# Patient Record
Sex: MALE | Race: ASIAN | NOT HISPANIC OR LATINO | Employment: FULL TIME | URBAN - METROPOLITAN AREA
[De-identification: names, ages, dates, MRNs, and addresses within clinical notes are randomized per-mention and may not be internally consistent; named-entity substitution may affect disease eponyms.]

---

## 2019-07-10 PROBLEM — F22 PARANOIA: Status: ACTIVE | Noted: 2019-07-10

## 2019-07-11 PROBLEM — R53.83 FATIGUE: Status: ACTIVE | Noted: 2019-07-11

## 2022-10-12 ENCOUNTER — HOSPITAL ENCOUNTER (EMERGENCY)
Facility: HOSPITAL | Age: 27
Discharge: PSYCHIATRIC OR SUBSTANCE USE FACILITY | End: 2022-10-13
Attending: FAMILY MEDICINE
Payer: MEDICAID

## 2022-10-12 VITALS
HEART RATE: 78 BPM | HEIGHT: 65 IN | WEIGHT: 125 LBS | RESPIRATION RATE: 20 BRPM | TEMPERATURE: 97 F | OXYGEN SATURATION: 100 % | DIASTOLIC BLOOD PRESSURE: 95 MMHG | BODY MASS INDEX: 20.83 KG/M2 | SYSTOLIC BLOOD PRESSURE: 156 MMHG

## 2022-10-12 DIAGNOSIS — F23 ACUTE PSYCHOSIS: Primary | ICD-10-CM

## 2022-10-12 LAB
BASOPHILS # BLD AUTO: 0.04 X10(3)/MCL (ref 0–0.2)
BASOPHILS NFR BLD AUTO: 0.3 %
EOSINOPHIL # BLD AUTO: 0.04 X10(3)/MCL (ref 0–0.9)
EOSINOPHIL NFR BLD AUTO: 0.3 %
ERYTHROCYTE [DISTWIDTH] IN BLOOD BY AUTOMATED COUNT: 11.8 % (ref 11.5–17)
HCT VFR BLD AUTO: 46.6 % (ref 42–52)
HGB BLD-MCNC: 16 GM/DL (ref 14–18)
IMM GRANULOCYTES # BLD AUTO: 0.06 X10(3)/MCL (ref 0–0.04)
IMM GRANULOCYTES NFR BLD AUTO: 0.5 %
LYMPHOCYTES # BLD AUTO: 2.11 X10(3)/MCL (ref 0.6–4.6)
LYMPHOCYTES NFR BLD AUTO: 16.9 %
MCH RBC QN AUTO: 30.1 PG (ref 27–31)
MCHC RBC AUTO-ENTMCNC: 34.3 MG/DL (ref 33–36)
MCV RBC AUTO: 87.8 FL (ref 80–94)
MONOCYTES # BLD AUTO: 1.08 X10(3)/MCL (ref 0.1–1.3)
MONOCYTES NFR BLD AUTO: 8.6 %
NEUTROPHILS # BLD AUTO: 9.2 X10(3)/MCL (ref 2.1–9.2)
NEUTROPHILS NFR BLD AUTO: 73.4 %
NRBC BLD AUTO-RTO: 0 %
PLATELET # BLD AUTO: 262 X10(3)/MCL (ref 130–400)
PMV BLD AUTO: 9.6 FL (ref 7.4–10.4)
RBC # BLD AUTO: 5.31 X10(6)/MCL (ref 4.7–6.1)
WBC # SPEC AUTO: 12.5 X10(3)/MCL (ref 4.5–11.5)

## 2022-10-12 PROCEDURE — 87635 SARS-COV-2 COVID-19 AMP PRB: CPT | Performed by: FAMILY MEDICINE

## 2022-10-12 PROCEDURE — 36415 COLL VENOUS BLD VENIPUNCTURE: CPT | Performed by: FAMILY MEDICINE

## 2022-10-12 PROCEDURE — 82077 ASSAY SPEC XCP UR&BREATH IA: CPT | Performed by: FAMILY MEDICINE

## 2022-10-12 PROCEDURE — 99285 EMERGENCY DEPT VISIT HI MDM: CPT | Mod: 25

## 2022-10-12 PROCEDURE — 85025 COMPLETE CBC W/AUTO DIFF WBC: CPT | Performed by: FAMILY MEDICINE

## 2022-10-12 PROCEDURE — 81001 URINALYSIS AUTO W/SCOPE: CPT | Mod: 59 | Performed by: FAMILY MEDICINE

## 2022-10-12 PROCEDURE — 84443 ASSAY THYROID STIM HORMONE: CPT | Performed by: FAMILY MEDICINE

## 2022-10-12 PROCEDURE — 80307 DRUG TEST PRSMV CHEM ANLYZR: CPT | Performed by: FAMILY MEDICINE

## 2022-10-12 PROCEDURE — 80053 COMPREHEN METABOLIC PANEL: CPT | Performed by: FAMILY MEDICINE

## 2022-10-13 ENCOUNTER — HOSPITAL ENCOUNTER (INPATIENT)
Facility: HOSPITAL | Age: 27
LOS: 6 days | Discharge: HOME | DRG: 885 | End: 2022-10-19
Attending: PSYCHIATRY & NEUROLOGY | Admitting: PSYCHIATRY & NEUROLOGY
Payer: MEDICAID

## 2022-10-13 DIAGNOSIS — F29 PSYCHOSIS: ICD-10-CM

## 2022-10-13 LAB
ALBUMIN SERPL-MCNC: 4.4 GM/DL (ref 3.5–5)
ALBUMIN/GLOB SERPL: 1.4 RATIO (ref 1.1–2)
ALP SERPL-CCNC: 71 UNIT/L (ref 40–150)
ALT SERPL-CCNC: 15 UNIT/L (ref 0–55)
AMPHET UR QL SCN: NEGATIVE
APPEARANCE UR: CLEAR
AST SERPL-CCNC: 16 UNIT/L (ref 5–34)
BACTERIA #/AREA URNS AUTO: ABNORMAL /HPF
BARBITURATE SCN PRESENT UR: NEGATIVE
BENZODIAZ UR QL SCN: NEGATIVE
BILIRUB UR QL STRIP.AUTO: NEGATIVE MG/DL
BILIRUBIN DIRECT+TOT PNL SERPL-MCNC: 1 MG/DL
BUN SERPL-MCNC: 18.6 MG/DL (ref 8.9–20.6)
CALCIUM SERPL-MCNC: 9.5 MG/DL (ref 8.4–10.2)
CANNABINOIDS UR QL SCN: POSITIVE
CHLORIDE SERPL-SCNC: 99 MMOL/L (ref 98–107)
CO2 SERPL-SCNC: 28 MMOL/L (ref 22–29)
COCAINE UR QL SCN: NEGATIVE
COLOR UR AUTO: YELLOW
CREAT SERPL-MCNC: 1.08 MG/DL (ref 0.73–1.18)
ETHANOL SERPL-MCNC: <10 MG/DL
FENTANYL UR QL SCN: NEGATIVE
GFR SERPLBLD CREATININE-BSD FMLA CKD-EPI: >60 MLS/MIN/1.73/M2
GLOBULIN SER-MCNC: 3.2 GM/DL (ref 2.4–3.5)
GLUCOSE SERPL-MCNC: 93 MG/DL (ref 74–100)
GLUCOSE UR QL STRIP.AUTO: NORMAL MG/DL
HYALINE CASTS #/AREA URNS LPF: ABNORMAL /LPF
KETONES UR QL STRIP.AUTO: ABNORMAL MG/DL
LEUKOCYTE ESTERASE UR QL STRIP.AUTO: NEGATIVE UNIT/L
MDMA UR QL SCN: NEGATIVE
MUCOUS THREADS URNS QL MICRO: ABNORMAL /LPF
NITRITE UR QL STRIP.AUTO: NEGATIVE
OPIATES UR QL SCN: NEGATIVE
PCP UR QL: NEGATIVE
PH UR STRIP.AUTO: 6 [PH]
PH UR: 6 [PH] (ref 3–11)
POTASSIUM SERPL-SCNC: 3.6 MMOL/L (ref 3.5–5.1)
PROT SERPL-MCNC: 7.6 GM/DL (ref 6.4–8.3)
PROT UR QL STRIP.AUTO: ABNORMAL MG/DL
RBC #/AREA URNS AUTO: ABNORMAL /HPF
RBC UR QL AUTO: NEGATIVE UNIT/L
SARS-COV-2 RDRP RESP QL NAA+PROBE: NEGATIVE
SODIUM SERPL-SCNC: 140 MMOL/L (ref 136–145)
SP GR UR STRIP.AUTO: 1.04
SPECIFIC GRAVITY, URINE AUTO (.000) (OHS): 1.04 (ref 1–1.03)
SQUAMOUS #/AREA URNS LPF: ABNORMAL /HPF
TSH SERPL-ACNC: 2.31 UIU/ML (ref 0.35–4.94)
UROBILINOGEN UR STRIP-ACNC: NORMAL MG/DL
WBC #/AREA URNS AUTO: ABNORMAL /HPF

## 2022-10-13 PROCEDURE — 25000003 PHARM REV CODE 250: Performed by: FAMILY MEDICINE

## 2022-10-13 PROCEDURE — 11400000 HC PSYCH PRIVATE ROOM

## 2022-10-13 PROCEDURE — 25000003 PHARM REV CODE 250: Performed by: PSYCHIATRY & NEUROLOGY

## 2022-10-13 RX ORDER — LORAZEPAM 2 MG/ML
2 INJECTION INTRAMUSCULAR EVERY 6 HOURS PRN
Status: DISCONTINUED | OUTPATIENT
Start: 2022-10-13 | End: 2022-10-14

## 2022-10-13 RX ORDER — DIPHENHYDRAMINE HCL 50 MG
50 CAPSULE ORAL EVERY 6 HOURS PRN
Status: DISCONTINUED | OUTPATIENT
Start: 2022-10-13 | End: 2022-10-14

## 2022-10-13 RX ORDER — ADHESIVE BANDAGE
30 BANDAGE TOPICAL DAILY PRN
Status: DISCONTINUED | OUTPATIENT
Start: 2022-10-13 | End: 2022-10-19 | Stop reason: HOSPADM

## 2022-10-13 RX ORDER — DIPHENHYDRAMINE HYDROCHLORIDE 50 MG/ML
50 INJECTION INTRAMUSCULAR; INTRAVENOUS EVERY 6 HOURS
Status: DISCONTINUED | OUTPATIENT
Start: 2022-10-13 | End: 2022-10-14

## 2022-10-13 RX ORDER — ONDANSETRON 4 MG/1
4 TABLET, ORALLY DISINTEGRATING ORAL EVERY 8 HOURS PRN
Status: DISCONTINUED | OUTPATIENT
Start: 2022-10-13 | End: 2022-10-19 | Stop reason: HOSPADM

## 2022-10-13 RX ORDER — OLANZAPINE 20 MG/1
20 TABLET, ORALLY DISINTEGRATING ORAL
Status: COMPLETED | OUTPATIENT
Start: 2022-10-13 | End: 2022-10-13

## 2022-10-13 RX ORDER — TRAZODONE HYDROCHLORIDE 100 MG/1
100 TABLET ORAL NIGHTLY PRN
Status: DISCONTINUED | OUTPATIENT
Start: 2022-10-13 | End: 2022-10-19 | Stop reason: HOSPADM

## 2022-10-13 RX ORDER — HYDROXYZINE HYDROCHLORIDE 50 MG/1
50 TABLET, FILM COATED ORAL EVERY 6 HOURS PRN
Status: DISCONTINUED | OUTPATIENT
Start: 2022-10-13 | End: 2022-10-14

## 2022-10-13 RX ORDER — ACETAMINOPHEN 325 MG/1
650 TABLET ORAL EVERY 6 HOURS PRN
Status: DISCONTINUED | OUTPATIENT
Start: 2022-10-13 | End: 2022-10-19 | Stop reason: HOSPADM

## 2022-10-13 RX ORDER — MAG HYDROX/ALUMINUM HYD/SIMETH 200-200-20
30 SUSPENSION, ORAL (FINAL DOSE FORM) ORAL EVERY 6 HOURS PRN
Status: DISCONTINUED | OUTPATIENT
Start: 2022-10-13 | End: 2022-10-19 | Stop reason: HOSPADM

## 2022-10-13 RX ORDER — MAG HYDROX/ALUMINUM HYD/SIMETH 200-200-20
30 SUSPENSION, ORAL (FINAL DOSE FORM) ORAL
Status: DISCONTINUED | OUTPATIENT
Start: 2022-10-13 | End: 2022-10-14

## 2022-10-13 RX ORDER — ARIPIPRAZOLE 5 MG/1
10 TABLET ORAL DAILY
Status: DISCONTINUED | OUTPATIENT
Start: 2022-10-13 | End: 2022-10-19 | Stop reason: HOSPADM

## 2022-10-13 RX ORDER — HALOPERIDOL 5 MG/1
10 TABLET ORAL EVERY 6 HOURS PRN
Status: DISCONTINUED | OUTPATIENT
Start: 2022-10-13 | End: 2022-10-14

## 2022-10-13 RX ORDER — IBUPROFEN 200 MG
1 TABLET ORAL DAILY
Status: DISCONTINUED | OUTPATIENT
Start: 2022-10-13 | End: 2022-10-18

## 2022-10-13 RX ORDER — PROMETHAZINE HYDROCHLORIDE 25 MG/1
25 TABLET ORAL EVERY 6 HOURS PRN
Status: DISCONTINUED | OUTPATIENT
Start: 2022-10-13 | End: 2022-10-19 | Stop reason: HOSPADM

## 2022-10-13 RX ORDER — HALOPERIDOL 5 MG/ML
10 INJECTION INTRAMUSCULAR EVERY 6 HOURS PRN
Status: DISCONTINUED | OUTPATIENT
Start: 2022-10-13 | End: 2022-10-14

## 2022-10-13 RX ORDER — HYDROXYZINE HYDROCHLORIDE 50 MG/1
50 TABLET, FILM COATED ORAL EVERY 4 HOURS PRN
Status: DISCONTINUED | OUTPATIENT
Start: 2022-10-13 | End: 2022-10-19 | Stop reason: HOSPADM

## 2022-10-13 RX ADMIN — HYDROXYZINE HYDROCHLORIDE 50 MG: 50 TABLET, FILM COATED ORAL at 10:10

## 2022-10-13 RX ADMIN — ARIPIPRAZOLE 10 MG: 5 TABLET ORAL at 01:10

## 2022-10-13 RX ADMIN — OLANZAPINE 20 MG: 20 TABLET, ORALLY DISINTEGRATING ORAL at 02:10

## 2022-10-13 NOTE — PROGRESS NOTES
10/13/22 1400   [facility] Group Therapy   Group Name Therapeutic Recreation   Specific Interventions Skilled Activity Creative Expression   Participation Level Minimal   Participation Quality Needs Redirection;Withdrawn   Insight/Motivation None   Affect/Mood Display Bizarre;Blunted   Cognition RIS   Psychomotor WNL

## 2022-10-13 NOTE — NURSING
This 27 year old male admitted from [facility] ED per PEC with psychosis. He is alert with tangential thought content. Mood is irritable. Contraband search done. Patient oriented to room. Staff will monitor closely for safety.

## 2022-10-13 NOTE — GROUP NOTE
Group Psychotherapy       Group Focus: Psychodynamic Group Psychotherapy      Number of patients in attendance: 10    Group topic: Cognitive Behavioral Therapy- Therapist explored patients need to improve thought processes and identify negative thinking patterns. Patient was able to discuss ways to learn new methods to improve ways of thinking going forward. Patient continues to make progress towards treatment goals.       Group Start Time: 1045  Group End Time:  1115  Groups Date: 10/13/2022  Group Topic:  Behavioral Health  Group Department: [facility] Behavioral Health Unit  Group Facilitators:  Max Lund LCSW  _____________________________________________________________________    Patient Name: Jurgen Berg  MRN: [id number]  Patient Class: IP- Psych   Admission Date\Time: 10/13/2022  3:56 AM  Hospital Length of Stay: 0  Primary Care Provider: Primary Doctor No     Referred by: Acute Psychiatry Unit Treatment Team     Target symptoms: Psychosis and Confusion     Patient's response to treatment: Not Participating     Progress toward goals: Progressing slowly       Plan: Continue treatment on [facility]

## 2022-10-13 NOTE — H&P
[facility] - Behavioral Health Unit  History & Physical    Subjective:      Chief Complaint/Reason for Admission: psychosis     Jurgen Berg is a 27 y.o. male. [place] park [place] police with rambling speech     No past medical history on file.  No past surgical history on file.  No family history on file.       No medications prior to admission.     Review of patient's allergies indicates:  No Known Allergies     Review of Systems   Constitutional: Negative.    HENT: Negative.     Eyes: Negative.    Respiratory: Negative.     Cardiovascular: Negative.    Gastrointestinal: Negative.    Genitourinary: Negative.    Musculoskeletal: Negative.    Skin: Negative.    Neurological: Negative.    Endo/Heme/Allergies: Negative.    Psychiatric/Behavioral:  Positive for hallucinations. Negative for depression, substance abuse and suicidal ideas.      Objective:      Vital Signs (Most Recent)  Temp: 97.5 °F (36.4 °C) (10/13/22 1100)  Pulse: 104 (10/13/22 1100)  Resp: 18 (10/13/22 1100)  BP: (!) 160/103 (10/13/22 1100)  SpO2: 98 % (10/13/22 1100)    Vital Signs Range (Last 24H):  Temp:  [97.2 °F (36.2 °C)-98.7 °F (37.1 °C)]   Pulse:  []   Resp:  [18-20]   BP: (122-160)/()   SpO2:  [97 %-100 %]     Physical Exam  HENT:      Head: Normocephalic.      Right Ear: Tympanic membrane normal.      Left Ear: Tympanic membrane normal.      Nose: Nose normal.      Mouth/Throat:      Mouth: Mucous membranes are moist.   Eyes:      Extraocular Movements: Extraocular movements intact.      Pupils: Pupils are equal, round, and reactive to light.   Cardiovascular:      Rate and Rhythm: Normal rate and regular rhythm.   Pulmonary:      Effort: Pulmonary effort is normal.   Abdominal:      General: Abdomen is flat.   Musculoskeletal:         General: Normal range of motion.   Skin:     General: Skin is warm.   Neurological:      General: No focal deficit present.      Mental Status: He is alert and oriented to person,  place, and time.      Comments: Vision normal   Hearing normal   EOM intact   Face muscles normal  Facial sensation normal   Shrugs shoulders  Tongue midline          Data Review:    Recent Results (from the past 48 hour(s))   Comprehensive metabolic panel    Collection Time: 10/12/22 11:38 PM   Result Value Ref Range    Sodium Level 140 136 - 145 mmol/L    Potassium Level 3.6 3.5 - 5.1 mmol/L    Chloride 99 98 - 107 mmol/L    Carbon Dioxide 28 22 - 29 mmol/L    Glucose Level 93 74 - 100 mg/dL    Blood Urea Nitrogen 18.6 8.9 - 20.6 mg/dL    Creatinine 1.08 0.73 - 1.18 mg/dL    Calcium Level Total 9.5 8.4 - 10.2 mg/dL    Protein Total 7.6 6.4 - 8.3 gm/dL    Albumin Level 4.4 3.5 - 5.0 gm/dL    Globulin 3.2 2.4 - 3.5 gm/dL    Albumin/Globulin Ratio 1.4 1.1 - 2.0 ratio    Bilirubin Total 1.0 <=1.5 mg/dL    Alkaline Phosphatase 71 40 - 150 unit/L    Alanine Aminotransferase 15 0 - 55 unit/L    Aspartate Aminotransferase 16 5 - 34 unit/L    eGFR >60 mls/min/1.73/m2   TSH    Collection Time: 10/12/22 11:38 PM   Result Value Ref Range    Thyroid Stimulating Hormone 2.3051 0.3500 - 4.9400 uIU/mL   Ethanol    Collection Time: 10/12/22 11:38 PM   Result Value Ref Range    Ethanol Level <10.0 <=10.0 mg/dL   CBC with Differential    Collection Time: 10/12/22 11:38 PM   Result Value Ref Range    WBC 12.5 (H) 4.5 - 11.5 x10(3)/mcL    RBC 5.31 4.70 - 6.10 x10(6)/mcL    Hgb 16.0 14.0 - 18.0 gm/dL    Hct 46.6 42.0 - 52.0 %    MCV 87.8 80.0 - 94.0 fL    MCH 30.1 27.0 - 31.0 pg    MCHC 34.3 33.0 - 36.0 mg/dL    RDW 11.8 11.5 - 17.0 %    Platelet 262 130 - 400 x10(3)/mcL    MPV 9.6 7.4 - 10.4 fL    Neut % 73.4 %    Lymph % 16.9 %    Mono % 8.6 %    Eos % 0.3 %    Basophil % 0.3 %    Lymph # 2.11 0.6 - 4.6 x10(3)/mcL    Neut # 9.2 2.1 - 9.2 x10(3)/mcL    Mono # 1.08 0.1 - 1.3 x10(3)/mcL    Eos # 0.04 0 - 0.9 x10(3)/mcL    Baso # 0.04 0 - 0.2 x10(3)/mcL    IG# 0.06 (H) 0 - 0.04 x10(3)/mcL    IG% 0.5 %    NRBC% 0.0 %   Urinalysis, Reflex  to Urine Culture Urine, Clean Catch    Collection Time: 10/12/22 11:44 PM    Specimen: Urine   Result Value Ref Range    Color, UA Yellow Yellow, Light-Yellow, Dark Yellow, Theresa, Straw    Appearance, UA Clear Clear    Specific Gravity, UA 1.037     pH, UA 6.0 5.0, 5.5, 6.0, 6.5, 7.0, 7.5, 8.0, 8.5    Protein, UA Trace (A) Negative mg/dL    Glucose, UA Normal Negative, Normal mg/dL    Ketones, UA 1+ (A) Negative mg/dL    Blood, UA Negative Negative unit/L    Bilirubin, UA Negative Negative mg/dL    Urobilinogen, UA Normal 0.2, 1.0, Normal mg/dL    Nitrites, UA Negative Negative    Leukocyte Esterase, UA Negative Negative unit/L    WBC, UA 0-5 None Seen, 0-2, 3-5, 0-5 /HPF    Bacteria, UA Trace (A) None Seen /HPF    Squamous Epithelial Cells, UA None Seen None Seen /HPF    Mucous, UA Few (A) None Seen /LPF    Hyaline Casts, UA None Seen None Seen /lpf    RBC, UA 0-5 None Seen, 0-2, 3-5, 0-5 /HPF   Drug Screen, Urine    Collection Time: 10/12/22 11:44 PM   Result Value Ref Range    Amphetamines, Urine Negative Negative    Barbituates, Urine Negative Negative    Benzodiazepine, Urine Negative Negative    Cannabinoids, Urine Positive (A) Negative    Cocaine, Urine Negative Negative    Fentanyl, Urine Negative Negative    MDMA, Urine Negative Negative    Opiates, Urine Negative Negative    Phencyclidine, Urine Negative Negative    pH, Urine 6.0 3.0 - 11.0    Specific Gravity, Urine Auto 1.037 (H) 1.001 - 1.035   COVID-19 Rapid Screening    Collection Time: 10/12/22 11:44 PM   Result Value Ref Range    SARS COV-2 MOLECULAR Negative Negative        No results found.       Assessment and Plan       Psychosis

## 2022-10-13 NOTE — PLAN OF CARE
Problem: Adult Inpatient Plan of Care  Goal: Plan of Care Review  Outcome: Ongoing, Not Progressing  Goal: Patient-Specific Goal (Individualized)  Outcome: Ongoing, Not Progressing  Goal: Absence of Hospital-Acquired Illness or Injury  Outcome: Ongoing, Not Progressing  Goal: Optimal Comfort and Wellbeing  Outcome: Ongoing, Not Progressing  Goal: Readiness for Transition of Care  Outcome: Ongoing, Not Progressing     Problem: Behavior Regulation Impairment (Psychotic Signs/Symptoms)  Goal: Improved Behavioral Control (Psychotic Signs/Symptoms)  Outcome: Ongoing, Not Progressing     Problem: Cognitive Impairment (Psychotic Signs/Symptoms)  Goal: Optimal Cognitive Function (Psychotic Signs/Symptoms)  Outcome: Ongoing, Not Progressing     Problem: Decreased Participation and Engagement (Psychotic Signs/Symptoms)  Goal: Increased Participation and Engagement (Psychotic Signs/Symptoms)  Outcome: Ongoing, Not Progressing     Problem: Mood Impairment (Psychotic Signs/Symptoms)  Goal: Improved Mood Symptoms (Psychotic Signs/Symptoms)  Outcome: Ongoing, Not Progressing     Problem: Psychomotor Impairment (Psychotic Signs/Symptoms)  Goal: Improved Psychomotor Symptoms (Psychotic Signs/Symptoms)  Outcome: Ongoing, Not Progressing     Problem: Sensory Perception Impairment (Psychotic Signs/Symptoms)  Goal: Decreased Sensory Symptoms (Psychotic Signs/Symptoms)  Outcome: Ongoing, Not Progressing     Problem: Sleep Disturbance (Psychotic Signs/Symptoms)  Goal: Improved Sleep (Psychotic Signs/Symptoms)  Outcome: Ongoing, Not Progressing     Problem: Social, Occupational or Functional Impairment (Psychotic Signs/Symptoms)  Goal: Enhanced Social, Occupational or Functional Skills (Psychotic Signs/Symptoms)  Outcome: Ongoing, Not Progressing     Problem: Social, Occupational or Functional Impairment (Psychotic Signs/Symptoms)  Goal: Enhanced Social, Occupational or Functional Skills (Psychotic Signs/Symptoms)  Outcome: Ongoing, Not  Progressing     Problem: Sleep Disturbance (Psychotic Signs/Symptoms)  Goal: Improved Sleep (Psychotic Signs/Symptoms)  Outcome: Ongoing, Not Progressing     Problem: Sensory Perception Impairment (Psychotic Signs/Symptoms)  Goal: Decreased Sensory Symptoms (Psychotic Signs/Symptoms)  Outcome: Ongoing, Not Progressing     Problem: Psychomotor Impairment (Psychotic Signs/Symptoms)  Goal: Improved Psychomotor Symptoms (Psychotic Signs/Symptoms)  Outcome: Ongoing, Not Progressing     Problem: Mood Impairment (Psychotic Signs/Symptoms)  Goal: Improved Mood Symptoms (Psychotic Signs/Symptoms)  Outcome: Ongoing, Not Progressing     Problem: Decreased Participation and Engagement (Psychotic Signs/Symptoms)  Goal: Increased Participation and Engagement (Psychotic Signs/Symptoms)  Outcome: Ongoing, Not Progressing     Problem: Cognitive Impairment (Psychotic Signs/Symptoms)  Goal: Optimal Cognitive Function (Psychotic Signs/Symptoms)  Outcome: Ongoing, Not Progressing     Problem: Behavior Regulation Impairment (Psychotic Signs/Symptoms)  Goal: Improved Behavioral Control (Psychotic Signs/Symptoms)  Outcome: Ongoing, Not Progressing     Problem: Adult Inpatient Plan of Care  Goal: Readiness for Transition of Care  Outcome: Ongoing, Not Progressing     Problem: Adult Inpatient Plan of Care  Goal: Optimal Comfort and Wellbeing  Outcome: Ongoing, Not Progressing     Problem: Adult Inpatient Plan of Care  Goal: Absence of Hospital-Acquired Illness or Injury  Outcome: Ongoing, Not Progressing     Problem: Adult Inpatient Plan of Care  Goal: Patient-Specific Goal (Individualized)  Outcome: Ongoing, Not Progressing     Problem: Adult Inpatient Plan of Care  Goal: Plan of Care Review  Outcome: Ongoing, Not Progressing

## 2022-10-13 NOTE — H&P
"10/13/2022 12:19 PM   Jurgen Berg   1995   [id number]            Psychiatry Inpatient Admission Note    Date of Admission: 10/13/2022  3:56 AM    Current Legal Status: Physician's Emergency Certificate (PEC)    Chief Complaint: "I don't need to be here"    SUBJECTIVE:   History of Present Illness:   Jurgen Berg is a 27 y.o. male placed under a PEC at [facility] after police evicted him from [facility] by [employer] due to bizarre behavior.  He was apparently providing vague and ambiguous answers to questions in the ED.  He states that he was at [facility] because he is in love with a girl and she was supposed to be meeting her at the park.  When she didn't show up, he screamed to the top of his lungs.  He reports that he does not need to be here, but states that he has "Bipolar 1 and has been through many manias."  He states that he is "Unipolar, which is why he was able to scream at the top of his lungs and then act normal like an actor."  Appears acutely manic.  States that he will refuse treatment "because he doesn't need it."  Poor insight.  Poor judgment.  States that he takes care of his mother but also states that she was babysitting someone while he was at the park.  Will admit for medication management and behavior monitoring.    UDS: (+)cannabinoids  Blood alcohol: <10      Past Psychiatric History:   Previous Psychiatric Hospitalizations: 2 prior inpatient hospitalizations   Previous Medication Trials: "I don't remember"  Previous Suicide Attempts: Denies   Outpatient psychiatrist: Denies    Past Medical/Surgical History:   No past medical history on file.  No past surgical history on file.      Family Psychiatric History:   Denies     Allergies:   Review of patient's allergies indicates:  No Known Allergies    Substance Abuse History:   Tobacco: Denies  Alcohol: Denies  Illicit Substances: Cannabis.  States "every now and then"  Treatment: Denies      Current Medications:   Home Psychiatric Meds: None current " (non-compliant)    Scheduled Meds:    aluminum-magnesium hydroxide-simethicone  30 mL Oral QID (AC & HS)    diphenhydrAMINE  50 mg Intramuscular Q6H    nicotine  1 patch Transdermal Daily      PRN Meds: acetaminophen, aluminum-magnesium hydroxide-simethicone, haloperidoL **AND** diphenhydrAMINE **AND** haloperidol lactate **AND** diphenhydrAMINE **AND** lorazepam, hydrOXYzine HCL, hydrOXYzine HCL, magnesium hydroxide 400 mg/5 ml, magnesium hydroxide 400 mg/5 ml, ondansetron, promethazine, trazodone, trazodone   Psychotherapeutics (From admission, onward)      Start     Stop Route Frequency Ordered    10/13/22 0438  traZODone tablet 100 mg  (Order Panel)         -- Oral Nightly PRN 10/13/22 0438    10/13/22 0438  haloperidoL tablet 10 mg  (Med - Acute  Behavioral Management)        Question:  Is the patient competent?  Answer:  Yes   See Hyperspace for full Linked Orders Report.    -- Oral Every 6 hours PRN 10/13/22 0438    10/13/22 0438  haloperidol lactate injection 10 mg  (Med - Acute  Behavioral Management)        See Hyperspace for full Linked Orders Report.    -- IM Every 6 hours PRN 10/13/22 0438    10/13/22 0438  LORazepam injection 2 mg  (Med - Acute  Behavioral Management)        Question:  Is the patient competent?  Answer:  Yes   See Hyperspace for full Linked Orders Report.    -- IM Every 6 hours PRN 10/13/22 0438    10/13/22 0438  traZODone tablet 100 mg  (Order Panel)        Question:  Is the patient competent?  Answer:  Yes    -- Oral Nightly PRN 10/13/22 0438              Social History:  Housing Status: Lives with his mother  Relationship Status/Sexual Orientation: Never    Children: None  Education: Associate's degree   Employment Status/Info: Works as a        Legal History:   Past Charges/Incarcerations: Denies   Pending charges: Denies      OBJECTIVE:   Medical Review Of Systems:  Constitutional: negative  Respiratory: negative  Cardiovascular: negative  Gastrointestinal:  negative  Genitourinary:negative  Musculoskeletal:negative  Neurological: negative    Vitals   Vitals:    10/13/22 1100   BP: (!) 160/103   Pulse: 104   Resp: 18   Temp: 97.5 °F (36.4 °C)        Labs/Imaging/Studies:   Recent Results (from the past 48 hour(s))   Comprehensive metabolic panel    Collection Time: 10/12/22 11:38 PM   Result Value Ref Range    Sodium Level 140 136 - 145 mmol/L    Potassium Level 3.6 3.5 - 5.1 mmol/L    Chloride 99 98 - 107 mmol/L    Carbon Dioxide 28 22 - 29 mmol/L    Glucose Level 93 74 - 100 mg/dL    Blood Urea Nitrogen 18.6 8.9 - 20.6 mg/dL    Creatinine 1.08 0.73 - 1.18 mg/dL    Calcium Level Total 9.5 8.4 - 10.2 mg/dL    Protein Total 7.6 6.4 - 8.3 gm/dL    Albumin Level 4.4 3.5 - 5.0 gm/dL    Globulin 3.2 2.4 - 3.5 gm/dL    Albumin/Globulin Ratio 1.4 1.1 - 2.0 ratio    Bilirubin Total 1.0 <=1.5 mg/dL    Alkaline Phosphatase 71 40 - 150 unit/L    Alanine Aminotransferase 15 0 - 55 unit/L    Aspartate Aminotransferase 16 5 - 34 unit/L    eGFR >60 mls/min/1.73/m2   TSH    Collection Time: 10/12/22 11:38 PM   Result Value Ref Range    Thyroid Stimulating Hormone 2.3051 0.3500 - 4.9400 uIU/mL   Ethanol    Collection Time: 10/12/22 11:38 PM   Result Value Ref Range    Ethanol Level <10.0 <=10.0 mg/dL   CBC with Differential    Collection Time: 10/12/22 11:38 PM   Result Value Ref Range    WBC 12.5 (H) 4.5 - 11.5 x10(3)/mcL    RBC 5.31 4.70 - 6.10 x10(6)/mcL    Hgb 16.0 14.0 - 18.0 gm/dL    Hct 46.6 42.0 - 52.0 %    MCV 87.8 80.0 - 94.0 fL    MCH 30.1 27.0 - 31.0 pg    MCHC 34.3 33.0 - 36.0 mg/dL    RDW 11.8 11.5 - 17.0 %    Platelet 262 130 - 400 x10(3)/mcL    MPV 9.6 7.4 - 10.4 fL    Neut % 73.4 %    Lymph % 16.9 %    Mono % 8.6 %    Eos % 0.3 %    Basophil % 0.3 %    Lymph # 2.11 0.6 - 4.6 x10(3)/mcL    Neut # 9.2 2.1 - 9.2 x10(3)/mcL    Mono # 1.08 0.1 - 1.3 x10(3)/mcL    Eos # 0.04 0 - 0.9 x10(3)/mcL    Baso # 0.04 0 - 0.2 x10(3)/mcL    IG# 0.06 (H) 0 - 0.04 x10(3)/mcL    IG% 0.5 %     NRBC% 0.0 %   Urinalysis, Reflex to Urine Culture Urine, Clean Catch    Collection Time: 10/12/22 11:44 PM    Specimen: Urine   Result Value Ref Range    Color, UA Yellow Yellow, Light-Yellow, Dark Yellow, Theresa, Straw    Appearance, UA Clear Clear    Specific Gravity, UA 1.037     pH, UA 6.0 5.0, 5.5, 6.0, 6.5, 7.0, 7.5, 8.0, 8.5    Protein, UA Trace (A) Negative mg/dL    Glucose, UA Normal Negative, Normal mg/dL    Ketones, UA 1+ (A) Negative mg/dL    Blood, UA Negative Negative unit/L    Bilirubin, UA Negative Negative mg/dL    Urobilinogen, UA Normal 0.2, 1.0, Normal mg/dL    Nitrites, UA Negative Negative    Leukocyte Esterase, UA Negative Negative unit/L    WBC, UA 0-5 None Seen, 0-2, 3-5, 0-5 /HPF    Bacteria, UA Trace (A) None Seen /HPF    Squamous Epithelial Cells, UA None Seen None Seen /HPF    Mucous, UA Few (A) None Seen /LPF    Hyaline Casts, UA None Seen None Seen /lpf    RBC, UA 0-5 None Seen, 0-2, 3-5, 0-5 /HPF   Drug Screen, Urine    Collection Time: 10/12/22 11:44 PM   Result Value Ref Range    Amphetamines, Urine Negative Negative    Barbituates, Urine Negative Negative    Benzodiazepine, Urine Negative Negative    Cannabinoids, Urine Positive (A) Negative    Cocaine, Urine Negative Negative    Fentanyl, Urine Negative Negative    MDMA, Urine Negative Negative    Opiates, Urine Negative Negative    Phencyclidine, Urine Negative Negative    pH, Urine 6.0 3.0 - 11.0    Specific Gravity, Urine Auto 1.037 (H) 1.001 - 1.035   COVID-19 Rapid Screening    Collection Time: 10/12/22 11:44 PM   Result Value Ref Range    SARS COV-2 MOLECULAR Negative Negative      No results found for: PHENYTOIN, PHENOBARB, VALPROATE, CBMZ        Psychiatric Mental Status Exam:  General Appearance: appears stated age, well-developed, well-nourished  Arousal: alert  Behavior: minimizing, argumentative  Movements and Motor Activity: no abnormal involuntary movements noted  Orientation: person, place, time, and  "situation  Speech: rambles, verbose  Mood: "I'm fine"  Affect: labile, poor emotional regulation  Thought Process: flight of ideas  Associations: intact  Thought Content and Perceptions: grandiose ideations, no suicidal ideation, no homicidal ideation  Recent and Remote Memory: recent memory intact, remote memory intact  Attention and Concentration: intact, attentive to conversation  Fund of Knowledge: intact, aware of current events, vocabulary appropriate  Insight: intact  Judgment: questionable        Patient Strengths:  Access to care, Able to verbalize needs, and Stable physical health      Patient Liabilities:  Medication non-compliance and Anny      Discharge Criteria:  Improved thought process, Medication compliance, Overall functional improvement, and Improved coping skills    ASSESSMENT/PLAN:   Diagnosis:  Bipolar Disorder, most recent episode mixed, severe (F31.63)  Cannabis use disorder (F12.10)    No past medical history on file.       Plan:  -Admit to to [facility]  -Abilify 10mg daily  -Will attempt to obtain outside psychiatric records if available  -SW to assist with aftercare planning and collateral  -Once stable discharge home with outpatient follow up care and/or rehab  -Continue inpatient treatment under a PEC and/or CEC for danger to self/ danger to others/grave disability as evidenced by danger to self and significant anny      Estimated length of stay: 5-7 days    Estimated Disposition: Home    Estimated Follow-up: Outpatient medication management      On this date, I have reviewed the medical history and Nursing Assessment, as well as records from referral source.  I have evaluated the mental status of the above named person and concur with the findings of all assessments.  I have provided medical direction for the development of the Treatment Plan.    I conclude that this patient meets admission criteria for inpatient treatment.  I certify that this patient poses a danger to self or others, or " would otherwise be considered gravely disabled based on this assessment and/or provided collateral information.     I have provided medical direction for the development of the Treatment plan.  These services will be provided while this patient is under my care and will be based on an individualized plan of care.  The patient can demonstrate a reasonable expectation of improvement in his/her disorder as a result of the active treatment being provided.      Elfego Booth M.D.

## 2022-10-13 NOTE — PLAN OF CARE
Problem: Adult Inpatient Plan of Care  Goal: Plan of Care Review  10/13/2022 0433 by Gordon Loza RN  Outcome: Ongoing, Progressing  10/13/2022 0430 by Gordon Loza RN  Outcome: Ongoing, Progressing  Goal: Patient-Specific Goal (Individualized)  10/13/2022 0433 by Gordon Loza RN  Outcome: Ongoing, Progressing  10/13/2022 0430 by Gordon Loza RN  Outcome: Ongoing, Progressing  Goal: Absence of Hospital-Acquired Illness or Injury  10/13/2022 0433 by Gordon Loza RN  Outcome: Ongoing, Progressing  10/13/2022 0430 by Gordon Loza RN  Outcome: Ongoing, Progressing  Goal: Optimal Comfort and Wellbeing  10/13/2022 0433 by Gordon Loza RN  Outcome: Ongoing, Progressing  10/13/2022 0430 by Gordon Loza RN  Outcome: Ongoing, Progressing  Goal: Readiness for Transition of Care  10/13/2022 0433 by Gordon Loza RN  Outcome: Ongoing, Progressing  10/13/2022 0430 by Gordon Loza RN  Outcome: Ongoing, Progressing     Problem: Behavior Regulation Impairment (Psychotic Signs/Symptoms)  Goal: Improved Behavioral Control (Psychotic Signs/Symptoms)  Outcome: Ongoing, Progressing     Problem: Cognitive Impairment (Psychotic Signs/Symptoms)  Goal: Optimal Cognitive Function (Psychotic Signs/Symptoms)  Outcome: Ongoing, Progressing     Problem: Decreased Participation and Engagement (Psychotic Signs/Symptoms)  Goal: Increased Participation and Engagement (Psychotic Signs/Symptoms)  Outcome: Ongoing, Progressing     Problem: Mood Impairment (Psychotic Signs/Symptoms)  Goal: Improved Mood Symptoms (Psychotic Signs/Symptoms)  Outcome: Ongoing, Progressing     Problem: Psychomotor Impairment (Psychotic Signs/Symptoms)  Goal: Improved Psychomotor Symptoms (Psychotic Signs/Symptoms)  Outcome: Ongoing, Progressing     Problem: Sensory Perception Impairment (Psychotic Signs/Symptoms)  Goal: Decreased Sensory Symptoms (Psychotic Signs/Symptoms)  Outcome: Ongoing, Progressing     Problem: Sleep Disturbance (Psychotic Signs/Symptoms)  Goal:  Improved Sleep (Psychotic Signs/Symptoms)  Outcome: Ongoing, Progressing     Problem: Social, Occupational or Functional Impairment (Psychotic Signs/Symptoms)  Goal: Enhanced Social, Occupational or Functional Skills (Psychotic Signs/Symptoms)  Outcome: Ongoing, Progressing

## 2022-10-13 NOTE — ED PROVIDER NOTES
"Encounter Date: 10/12/2022       History     Chief Complaint   Patient presents with    Psychiatric Evaluation     Evicted from [facility] by [employer] this PM. Pt bizarre but responds to questions with vague and ambiguous answers. Denies SI, HI, AV hallucinations.     27-year-old male brought in by ambulance for psychiatric evaluation.  Patient was evicted by [place] police department from [facility].  Patient reports that he was waiting for his "one true love", and rambling with significant tangential thoughts.  Patient reports that he lives in [place], born and raised here, and still lives with his mother.  Patient denies suicidal or homicidal ideations.  Currently calm and cooperative though very bizarre behavior.    The history is provided by the patient and the EMS personnel.   Review of patient's allergies indicates:  No Known Allergies  History reviewed. No pertinent past medical history.  History reviewed. No pertinent surgical history.  History reviewed. No pertinent family history.     Review of Systems   Constitutional:  Negative for chills, fatigue and fever.   HENT:  Negative for ear pain, rhinorrhea and sore throat.    Eyes:  Negative for photophobia and pain.   Respiratory:  Negative for cough, shortness of breath and wheezing.    Cardiovascular:  Negative for chest pain.   Gastrointestinal:  Negative for abdominal pain, diarrhea, nausea and vomiting.   Genitourinary:  Negative for dysuria.   Neurological:  Negative for dizziness, weakness and headaches.   All other systems reviewed and are negative.    Physical Exam     Initial Vitals [10/12/22 2324]   BP Pulse Resp Temp SpO2   (!) 156/95 78 20 97.2 °F (36.2 °C) 100 %      MAP       --         Physical Exam    Nursing note and vitals reviewed.  Constitutional: He appears well-developed and well-nourished.   HENT:   Head: Normocephalic and atraumatic.   Eyes: EOM are normal. Pupils are equal, round, and reactive to light.   Neck: Neck supple. "   Normal range of motion.  Cardiovascular:  Normal rate, regular rhythm and normal heart sounds.     Exam reveals no gallop and no friction rub.       No murmur heard.  Pulmonary/Chest: Breath sounds normal. No respiratory distress.   Abdominal: Abdomen is soft. Bowel sounds are normal. He exhibits no distension. There is no abdominal tenderness.   Musculoskeletal:         General: Normal range of motion.      Cervical back: Normal range of motion and neck supple.     Neurological: He is alert and oriented to person, place, and time. He has normal strength.   Skin: Skin is warm and dry.   Psychiatric: He has a normal mood and affect. His behavior is normal. His speech is tangential. Thought content is delusional. He expresses impulsivity.       ED Course   Procedures  Labs Reviewed   URINALYSIS, REFLEX TO URINE CULTURE - Abnormal; Notable for the following components:       Result Value    Protein, UA Trace (*)     Ketones, UA 1+ (*)     Bacteria, UA Trace (*)     Mucous, UA Few (*)     All other components within normal limits   DRUG SCREEN, URINE (BEAKER) - Abnormal; Notable for the following components:    Cannabinoids, Urine Positive (*)     Specific Gravity, Urine Auto 1.037 (*)     All other components within normal limits    Narrative:     Cut off concentrations:    Amphetamines - 1000 ng/ml  Barbiturates - 200 ng/ml  Benzodiazepine - 200 ng/ml  Cannabinoids (THC) - 50 ng/ml  Cocaine - 300 ng/ml  Fentanyl - 1.0 ng/ml  MDMA - 500 ng/ml  Opiates - 300 ng/ml   Phencyclidine (PCP) - 25 ng/ml    Specimen submitted for drug analysis and tested for pH and specific gravity in order to evaluate sample integrity. Suspect tampering if specific gravity is <1.003 and/or pH is not within the range of 4.5 - 8.0  False negatives may result form substances such as bleach added to urine.  False positives may result for the presence of a substance with similar chemical structure to the drug or its metabolite.    This test  provides only a PRELIMINARY analytical test result. A more specific alternate chemical method must be used in order to obtain a confirmed analytical result. Gas chromatography/mass spectrometry (GC/MS) is the preferred confirmatory method. Other chemical confirmation methods are available. Clinical consideration and professional judgement should be applied to any drug of abuse test result, particularly when preliminary positive results are used.    Positive results will be confirmed only at the physicians request. Unconfirmed screening results are to be used only for medical purposes (treatment).        CBC WITH DIFFERENTIAL - Abnormal; Notable for the following components:    WBC 12.5 (*)     IG# 0.06 (*)     All other components within normal limits   TSH - Normal   ALCOHOL,MEDICAL (ETHANOL) - Normal   SARS-COV-2 RNA AMPLIFICATION, QUAL - Normal    Narrative:     The IDNOW COVID-19 assay is a rapid molecular in vitro diagnostic test utilizing an isothermal nucleic acid amplification technology intended for the qualitative detection of nucleic acid from the SARS-CoV-2 viral RNA in direct nasal, nasopharyngeal or throat swabs from individuals who are suspected of COVID-19 by their healthcare provider.   CBC W/ AUTO DIFFERENTIAL    Narrative:     The following orders were created for panel order CBC auto differential.  Procedure                               Abnormality         Status                     ---------                               -----------         ------                     CBC with Differential[[id number]]        Abnormal            Final result                 Please view results for these tests on the individual orders.   COMPREHENSIVE METABOLIC PANEL   EXTRA TUBES    Narrative:     The following orders were created for panel order EXTRA TUBES.  Procedure                               Abnormality         Status                     ---------                               -----------         ------                      Gold Top Hold[[id number]]                                                                 Please view results for these tests on the individual orders.   GOLD TOP HOLD          Imaging Results    None          Medications - No data to display  Medical Decision Making:   Initial Assessment:   Suspect drug-induced type psychosis, versus new onset schizophrenia.  No previous records in system for the patient.  Will try and get parents contact information and call them for further additional information regarding the patient.  Due to current psychosis, patient placed under physician's Emergency certificate as he is currently gravely disabled.           ED Course as of 10/13/22 0115   Thu Oct 13, 2022   0113 Cannabinoids, Urine(!): Positive [MW]   0114 Cleared for psychiatric placement.  On further review, patient does have a psychiatric history.  Patient initially had the wrong name in the chart, therefore showed up as a new record.  Patient has been calm and cooperative while in the emergency room. [MW]      ED Course User Index  [MW] Loy Barker MD       Medically cleared for psychiatry placement: 10/13/2022  1:14 AM         Clinical Impression:   Final diagnoses:  [F23] Acute psychosis (Primary)      ED Disposition Condition    Transfer to Psych Facility Stable          ED Prescriptions    None       Follow-up Information    None          Lyo Barker MD  10/13/22 0115

## 2022-10-13 NOTE — PROGRESS NOTES
10/13/22 1000   [facility] Group Therapy   Group Name Therapeutic Recreation   Specific Interventions Skilled Activity Mild Exercises   Participation Level None   Participation Quality Disruptive;Needs Redirection;Requires Prompting   Insight/Motivation None   Affect/Mood Display Bizarre;Elevated;Impulsive;Labile;Inappropriate;Irritable   Cognition RIS   Psychomotor WNL

## 2022-10-14 LAB
ALBUMIN SERPL-MCNC: 4.1 GM/DL (ref 3.5–5)
ALBUMIN/GLOB SERPL: 1.2 RATIO (ref 1.1–2)
ALP SERPL-CCNC: 72 UNIT/L (ref 40–150)
ALT SERPL-CCNC: 18 UNIT/L (ref 0–55)
AST SERPL-CCNC: 21 UNIT/L (ref 5–34)
BASOPHILS # BLD AUTO: 0.02 X10(3)/MCL (ref 0–0.2)
BASOPHILS NFR BLD AUTO: 0.3 %
BILIRUBIN DIRECT+TOT PNL SERPL-MCNC: 0.8 MG/DL
BUN SERPL-MCNC: 14.6 MG/DL (ref 8.9–20.6)
CALCIUM SERPL-MCNC: 9.7 MG/DL (ref 8.4–10.2)
CHLORIDE SERPL-SCNC: 102 MMOL/L (ref 98–107)
CHOLEST SERPL-MCNC: 127 MG/DL
CHOLEST/HDLC SERPL: 4 {RATIO} (ref 0–5)
CO2 SERPL-SCNC: 31 MMOL/L (ref 22–29)
CREAT SERPL-MCNC: 1.06 MG/DL (ref 0.73–1.18)
EOSINOPHIL # BLD AUTO: 0.1 X10(3)/MCL (ref 0–0.9)
EOSINOPHIL NFR BLD AUTO: 1.3 %
ERYTHROCYTE [DISTWIDTH] IN BLOOD BY AUTOMATED COUNT: 12 % (ref 11.5–17)
GFR SERPLBLD CREATININE-BSD FMLA CKD-EPI: >60 MLS/MIN/1.73/M2
GLOBULIN SER-MCNC: 3.4 GM/DL (ref 2.4–3.5)
GLUCOSE SERPL-MCNC: 89 MG/DL (ref 74–100)
HCT VFR BLD AUTO: 47.3 % (ref 42–52)
HDLC SERPL-MCNC: 36 MG/DL (ref 35–60)
HGB BLD-MCNC: 16.2 GM/DL (ref 14–18)
IMM GRANULOCYTES # BLD AUTO: 0.03 X10(3)/MCL (ref 0–0.04)
IMM GRANULOCYTES NFR BLD AUTO: 0.4 %
LDLC SERPL CALC-MCNC: 79 MG/DL (ref 50–140)
LYMPHOCYTES # BLD AUTO: 2.27 X10(3)/MCL (ref 0.6–4.6)
LYMPHOCYTES NFR BLD AUTO: 30.4 %
MCH RBC QN AUTO: 30.2 PG (ref 27–31)
MCHC RBC AUTO-ENTMCNC: 34.2 MG/DL (ref 33–36)
MCV RBC AUTO: 88.2 FL (ref 80–94)
MONOCYTES # BLD AUTO: 0.53 X10(3)/MCL (ref 0.1–1.3)
MONOCYTES NFR BLD AUTO: 7.1 %
NEUTROPHILS # BLD AUTO: 4.5 X10(3)/MCL (ref 2.1–9.2)
NEUTROPHILS NFR BLD AUTO: 60.5 %
NRBC BLD AUTO-RTO: 0 %
PLATELET # BLD AUTO: 252 X10(3)/MCL (ref 130–400)
PMV BLD AUTO: 10.1 FL (ref 7.4–10.4)
POTASSIUM SERPL-SCNC: 5.1 MMOL/L (ref 3.5–5.1)
PROT SERPL-MCNC: 7.5 GM/DL (ref 6.4–8.3)
RBC # BLD AUTO: 5.36 X10(6)/MCL (ref 4.7–6.1)
SODIUM SERPL-SCNC: 142 MMOL/L (ref 136–145)
T PALLIDUM AB SER QL: NONREACTIVE
TRIGL SERPL-MCNC: 62 MG/DL (ref 34–140)
TSH SERPL-ACNC: 1.35 UIU/ML (ref 0.35–4.94)
VLDLC SERPL CALC-MCNC: 12 MG/DL
WBC # SPEC AUTO: 7.5 X10(3)/MCL (ref 4.5–11.5)

## 2022-10-14 PROCEDURE — 80053 COMPREHEN METABOLIC PANEL: CPT | Performed by: PSYCHIATRY & NEUROLOGY

## 2022-10-14 PROCEDURE — 36415 COLL VENOUS BLD VENIPUNCTURE: CPT | Performed by: PSYCHIATRY & NEUROLOGY

## 2022-10-14 PROCEDURE — 86780 TREPONEMA PALLIDUM: CPT | Performed by: PSYCHIATRY & NEUROLOGY

## 2022-10-14 PROCEDURE — 85025 COMPLETE CBC W/AUTO DIFF WBC: CPT | Performed by: PSYCHIATRY & NEUROLOGY

## 2022-10-14 PROCEDURE — 84443 ASSAY THYROID STIM HORMONE: CPT | Performed by: PSYCHIATRY & NEUROLOGY

## 2022-10-14 PROCEDURE — 80061 LIPID PANEL: CPT | Performed by: PSYCHIATRY & NEUROLOGY

## 2022-10-14 PROCEDURE — 11400000 HC PSYCH PRIVATE ROOM

## 2022-10-14 PROCEDURE — 25000003 PHARM REV CODE 250: Performed by: PSYCHIATRY & NEUROLOGY

## 2022-10-14 RX ORDER — LORAZEPAM 1 MG/1
2 TABLET ORAL EVERY 6 HOURS PRN
Status: DISCONTINUED | OUTPATIENT
Start: 2022-10-14 | End: 2022-10-19 | Stop reason: HOSPADM

## 2022-10-14 RX ORDER — HALOPERIDOL 5 MG/ML
10 INJECTION INTRAMUSCULAR EVERY 6 HOURS PRN
Status: DISCONTINUED | OUTPATIENT
Start: 2022-10-14 | End: 2022-10-19 | Stop reason: HOSPADM

## 2022-10-14 RX ORDER — DIPHENHYDRAMINE HYDROCHLORIDE 50 MG/ML
50 INJECTION INTRAMUSCULAR; INTRAVENOUS EVERY 6 HOURS PRN
Status: DISCONTINUED | OUTPATIENT
Start: 2022-10-14 | End: 2022-10-19 | Stop reason: HOSPADM

## 2022-10-14 RX ORDER — LORAZEPAM 2 MG/ML
2 INJECTION INTRAMUSCULAR EVERY 6 HOURS PRN
Status: DISCONTINUED | OUTPATIENT
Start: 2022-10-14 | End: 2022-10-19 | Stop reason: HOSPADM

## 2022-10-14 RX ORDER — DIPHENHYDRAMINE HCL 50 MG
50 CAPSULE ORAL EVERY 6 HOURS PRN
Status: DISCONTINUED | OUTPATIENT
Start: 2022-10-14 | End: 2022-10-19 | Stop reason: HOSPADM

## 2022-10-14 RX ORDER — HALOPERIDOL 5 MG/1
10 TABLET ORAL EVERY 6 HOURS PRN
Status: DISCONTINUED | OUTPATIENT
Start: 2022-10-14 | End: 2022-10-19 | Stop reason: HOSPADM

## 2022-10-14 RX ADMIN — TRAZODONE HYDROCHLORIDE 100 MG: 100 TABLET ORAL at 08:10

## 2022-10-14 RX ADMIN — HYDROXYZINE HYDROCHLORIDE 50 MG: 50 TABLET, FILM COATED ORAL at 09:10

## 2022-10-14 RX ADMIN — ARIPIPRAZOLE 10 MG: 5 TABLET ORAL at 09:10

## 2022-10-14 RX ADMIN — HYDROXYZINE HYDROCHLORIDE 50 MG: 50 TABLET, FILM COATED ORAL at 12:10

## 2022-10-14 NOTE — PLAN OF CARE
"Psychosocial Assessment     Pt is a 27 y.o. YO  male admitted due to bizarre behavior. Pt UDS was Positive for THC.  Smokes THC vape pen. Pt ETOH < 10. Pt denies  SI, HI, and AVH. Pt last inpatient  was maybe a few months . Pt presents Cooperative with CM staff 1:1. [place] Pt has  0 dependents. Pt  is Single .  Pt completed Some college. Two year degree in music production. Pt denies  service.  Pt denies financial issues. Pt employed.  Pt works at  [employer]. Pt denies legal issues. Pt states they do not receive comfort from spiritual practices. Pt emergency contact is Torrie, his Sister. Their phone number is   [phone]. Pt discharge plan at this time is home. Currently lives in [place], [place]. Has been told he has bipolar one. Does report being depressed and some anxiety over the last few weeks.    "I was waiting for the love of my life, I been going through an episode. Her name is Osmin Odell, as time went on, I realized she was the one for me. I became desperate. I have never screamed so loud before in my life"  Inpatient a couple years ago in [place] for psychiatric reasons.       10/14/22 1231   Initial Information   Source of Information patient   Reason for Admission Bizarre behavior   Mutuality/Individual Preferences   Patient-Specific Goals (Include Timeframe) Jurgen will remain compliant with medications to promote a decrease in symptoms over 7 days.   Coping/Stress/Tolerance   Major Change/Loss/Stressor/Fears relationship concerns   Relationship/Environment   Primary Source of Support/Comfort sibling(s)   Name of Support/Comfort Primary Source Torrie Berg   Lives With parent(s)   Resource/Environmental Concerns   Current Living Arrangements home/apartment/condo   Substance Use/Withdrawal   Substance Use Current, used prior to admission  (UDS pos for THC)   Additional Tobacco Use   How many cigarettes do you typically have per day? 0   Abuse Screen (yes " response referral indicated)   Feels Unsafe at Home or Work/School no   Feels Threatened by Someone no   Does Anyone Try to Keep You From Having Contact with Others or Doing Things Outside Your Home? no   Physical Signs of Abuse Present no   Abuse Details   Physical Abuse No   Sexual Abuse No   Suicidal Ideation   1. Wish to be Dead (Lifetime) No   1. Wish to be Dead (Past Month) No   1. Wish to be Dead (Now) No   2. Non-Specific Active Suicidal Thoughts (Lifetime) No   2. Non-Specific Active Suicidal Thoughts (Past Month) No   2. Non-Specific Active Suicidal Thoughts (Now) No   3. Active Suicidal Ideation with any Methods (Not Plan) Without Intent to Act (Lifetime) No   3. Active Sucidal Ideation with any Methods (Not Plan) Without Intent to Act (Past Month) No   3. Active Sucidal Ideation with any Methods (Not Plan) Without Intent to Act (Now) No   4. Active Suicidal Ideation with Some Intent to Act, Without Specific Plan (Lifetime) No   4. Active Suicidal Ideation with Some Intent to Act, Without Specific Plan (Past Month) No   4. Active Suicidal Ideation with Some Intent to Act, Without Specific Plan (Now) No   5. Active Suicidal Ideation with Specific Plan and Intent (Lifetime) No   5. Active Suicidal Ideation with Specific Plan and Intent (Past Month) No   5. Active Suicidal Ideation with Specific Plan and Intent (Now) No   Suicide Risk No Risk   Violence Risk   Feels Like Hurting Others no   Previous Attempt to Harm Others no   AUDIT-C (Alcohol Use Disorders ID Test)   Alcohol Use In Past Year 0-->never   Alcohol Amount Per Day In Past Year 0-->none   More Than 6 Drinks On One Occasion 0-->never   Total AUDIT-C Score 0   Depression Screen (Over the Past Two Weeks)   Have You Felt Down, Depressed or Hopeless? yes   Have You Felt Little Interest or Pleasure in Doing Things? yes   Transition Planning   Patient/Family Anticipates Transition to home with family   Patient/Family Anticipated Services at Transition  mental health services   Transportation Anticipated family or friend will provide

## 2022-10-14 NOTE — PLAN OF CARE
Problem: Adult Inpatient Plan of Care  Goal: Plan of Care Review  Outcome: Ongoing, Progressing  Goal: Patient-Specific Goal (Individualized)  Outcome: Ongoing, Progressing  Goal: Absence of Hospital-Acquired Illness or Injury  Outcome: Ongoing, Progressing  Goal: Optimal Comfort and Wellbeing  Outcome: Ongoing, Progressing  Goal: Readiness for Transition of Care  Outcome: Ongoing, Progressing     Problem: Behavior Regulation Impairment (Psychotic Signs/Symptoms)  Goal: Improved Behavioral Control (Psychotic Signs/Symptoms)  Outcome: Ongoing, Progressing     Problem: Cognitive Impairment (Psychotic Signs/Symptoms)  Goal: Optimal Cognitive Function (Psychotic Signs/Symptoms)  Outcome: Ongoing, Progressing     Problem: Decreased Participation and Engagement (Psychotic Signs/Symptoms)  Goal: Increased Participation and Engagement (Psychotic Signs/Symptoms)  Outcome: Ongoing, Progressing     Problem: Mood Impairment (Psychotic Signs/Symptoms)  Goal: Improved Mood Symptoms (Psychotic Signs/Symptoms)  Outcome: Ongoing, Progressing     Problem: Psychomotor Impairment (Psychotic Signs/Symptoms)  Goal: Improved Psychomotor Symptoms (Psychotic Signs/Symptoms)  Outcome: Ongoing, Progressing     Problem: Sensory Perception Impairment (Psychotic Signs/Symptoms)  Goal: Decreased Sensory Symptoms (Psychotic Signs/Symptoms)  Outcome: Ongoing, Progressing     Problem: Sleep Disturbance (Psychotic Signs/Symptoms)  Goal: Improved Sleep (Psychotic Signs/Symptoms)  Outcome: Ongoing, Progressing     Problem: Social, Occupational or Functional Impairment (Psychotic Signs/Symptoms)  Goal: Enhanced Social, Occupational or Functional Skills (Psychotic Signs/Symptoms)  Outcome: Ongoing, Progressing

## 2022-10-14 NOTE — PROGRESS NOTES
10/14/22 1400   [facility] Group Therapy   Group Name Therapeutic Recreation   Specific Interventions Skilled Activity Creative Expression   Participation Level Active;Attentive   Participation Quality Cooperative   Insight/Motivation None   Affect/Mood Display Blunted;Anxious   Cognition Pre-Occupied;RIS   Psychomotor WNL

## 2022-10-14 NOTE — PROGRESS NOTES
10/14/2022 2:01 PM   Jurgen Berg   1995   [id number]        Psychiatry Progress Note     SUBJECTIVE:   Jurgen Berg is a 27 y.o. male placed under a [facility] at [facility] after police evicted him from [place] by [employer] due to bizarre behavior.  Calmer and more linear today.  Much less irritability.  Abilify started yesterday.  Appears to be tolerating well.    UDS: (+)cannabinoids  Blood alcohol: <10     Current Medications:   Scheduled Meds:    ARIPiprazole  10 mg Oral Daily    nicotine  1 patch Transdermal Daily      PRN Meds: acetaminophen, aluminum-magnesium hydroxide-simethicone, haloperidoL **AND** diphenhydrAMINE **AND** haloperidol lactate **AND** diphenhydrAMINE **AND** lorazepam, hydrOXYzine HCL, LORazepam, magnesium hydroxide 400 mg/5 ml, magnesium hydroxide 400 mg/5 ml, ondansetron, promethazine, trazodone, trazodone   Psychotherapeutics (From admission, onward)      Start     Stop Route Frequency Ordered    10/14/22 0846  LORazepam tablet 2 mg         -- Oral Every 6 hours PRN 10/14/22 0748    10/14/22 0750  haloperidoL tablet 10 mg  (Med - Acute  Behavioral Management)        Question:  Is the patient competent?  Answer:  Yes   See Hyperspace for full Linked Orders Report.    -- Oral Every 6 hours PRN 10/14/22 0755    10/14/22 0750  haloperidol lactate injection 10 mg  (Med - Acute  Behavioral Management)        See Hyperspace for full Linked Orders Report.    -- IM Every 6 hours PRN 10/14/22 0755    10/14/22 0750  LORazepam injection 2 mg  (Med - Acute  Behavioral Management)        Question:  Is the patient competent?  Answer:  Yes   See Hyperspace for full Linked Orders Report.    -- IM Every 6 hours PRN 10/14/22 0755    10/13/22 1345  ARIPiprazole tablet 10 mg         -- Oral Daily 10/13/22 1243    10/13/22 0438  traZODone tablet 100 mg  (Order Panel)         -- Oral Nightly PRN 10/13/22 0438    10/13/22 0438  traZODone tablet 100 mg  (Order Panel)        Question:  Is the patient competent?  Answer:  Yes     -- Oral Nightly PRN 10/13/22 0438            Allergies:   Review of patient's allergies indicates:  No Known Allergies     OBJECTIVE:   Vitals   Vitals:    10/14/22 1100   BP: 126/77   Pulse: 90   Resp: 20   Temp: 97.9 °F (36.6 °C)        Labs/Imaging/Studies:   Recent Results (from the past 36 hour(s))   Comprehensive metabolic panel    Collection Time: 10/14/22  6:37 AM   Result Value Ref Range    Sodium Level 142 136 - 145 mmol/L    Potassium Level 5.1 3.5 - 5.1 mmol/L    Chloride 102 98 - 107 mmol/L    Carbon Dioxide 31 (H) 22 - 29 mmol/L    Glucose Level 89 74 - 100 mg/dL    Blood Urea Nitrogen 14.6 8.9 - 20.6 mg/dL    Creatinine 1.06 0.73 - 1.18 mg/dL    Calcium Level Total 9.7 8.4 - 10.2 mg/dL    Protein Total 7.5 6.4 - 8.3 gm/dL    Albumin Level 4.1 3.5 - 5.0 gm/dL    Globulin 3.4 2.4 - 3.5 gm/dL    Albumin/Globulin Ratio 1.2 1.1 - 2.0 ratio    Bilirubin Total 0.8 <=1.5 mg/dL    Alkaline Phosphatase 72 40 - 150 unit/L    Alanine Aminotransferase 18 0 - 55 unit/L    Aspartate Aminotransferase 21 5 - 34 unit/L    eGFR >60 mls/min/1.73/m2   SYPHILIS ANTIBODY (WITH REFLEX RPR)    Collection Time: 10/14/22  6:37 AM   Result Value Ref Range    Syphilis Antibody Nonreactive Nonreactive, Equivocal   Lipid panel    Collection Time: 10/14/22  6:37 AM   Result Value Ref Range    Cholesterol Total 127 <=200 mg/dL    HDL Cholesterol 36 35 - 60 mg/dL    Triglyceride 62 34 - 140 mg/dL    Cholesterol/HDL Ratio 4 0 - 5    Very Low Density Lipoprotein 12     LDL Cholesterol 79.00 50.00 - 140.00 mg/dL   TSH    Collection Time: 10/14/22  6:37 AM   Result Value Ref Range    Thyroid Stimulating Hormone 1.3518 0.3500 - 4.9400 uIU/mL   CBC with Differential    Collection Time: 10/14/22  6:37 AM   Result Value Ref Range    WBC 7.5 4.5 - 11.5 x10(3)/mcL    RBC 5.36 4.70 - 6.10 x10(6)/mcL    Hgb 16.2 14.0 - 18.0 gm/dL    Hct 47.3 42.0 - 52.0 %    MCV 88.2 80.0 - 94.0 fL    MCH 30.2 27.0 - 31.0 pg    MCHC 34.2 33.0 - 36.0 mg/dL     "RDW 12.0 11.5 - 17.0 %    Platelet 252 130 - 400 x10(3)/mcL    MPV 10.1 7.4 - 10.4 fL    Neut % 60.5 %    Lymph % 30.4 %    Mono % 7.1 %    Eos % 1.3 %    Basophil % 0.3 %    Lymph # 2.27 0.6 - 4.6 x10(3)/mcL    Neut # 4.5 2.1 - 9.2 x10(3)/mcL    Mono # 0.53 0.1 - 1.3 x10(3)/mcL    Eos # 0.10 0 - 0.9 x10(3)/mcL    Baso # 0.02 0 - 0.2 x10(3)/mcL    IG# 0.03 0 - 0.04 x10(3)/mcL    IG% 0.4 %    NRBC% 0.0 %          Medical Review Of Systems:  Constitutional: negative  Respiratory: negative  Cardiovascular: negative  Gastrointestinal: negative  Genitourinary:negative  Musculoskeletal:negative  Neurological: negative      Psychiatric Mental Status Exam:  General Appearance: appears stated age, well-developed, well-nourished  Arousal: alert  Behavior: improving  Movements and Motor Activity: no abnormal involuntary movements noted  Orientation: person, place, time, and situation  Speech: rambles, verbose  Mood: "Better"  Affect: improving  Thought Process: flight of ideas  Associations: intact  Thought Content and Perceptions: grandiose ideations (improving), no suicidal ideation, no homicidal ideation  Recent and Remote Memory: recent memory intact, remote memory intact  Attention and Concentration: intact, attentive to conversation  Fund of Knowledge: intact, aware of current events, vocabulary appropriate  Insight: questionable  Judgment: questionable    ASSESSMENT/PLAN:   Diagnosis:  Bipolar Disorder, most recent episode mixed, severe (F31.63)  Cannabis use disorder (F12.10)    No past medical history on file.     Plan:  -Will plan to titrate Abilify during his stay.    Expected Disposition Plan: Home        Elfego Booth M.D.  "

## 2022-10-14 NOTE — CARE UPDATE
I was waiting for the love of my life, I been going through an episode.   Her name is Osmin Odell, as time went on, I realized she was the one for me. I became desperate. I have never screamed so loud before in my life. Inpatient a couple years ago in [place] was in psych hospital     Psychosocial Assessment     Pt is a 27 y.o. YO  male admitted due to ***. Pt UDS was Positive for THC.  Smokes vape pen. Pt ETOH < 10. Pt denies  SI, HI, and AVH. Pt last inpatient  was maybe a few months . Pt presents Cooperative with CM staff 1:1. Pt originally from [place]  . Pt has  0 dependents. Pt  is Single .  Pt completed Some college. Two year degree in music production. Pt denies  service.  Pt denies financial issues. Pt employed.  Pt works at  [employer]. Pt denies legal issues. Pt states they do not receive comfort from spiritual practices. Pt emergency contact is Torrie, his Sister. Their phone number is   [phone]. . Pt discharge plan at this time is home. Currently lives in [place], [place]. Torrie Correa Has been told he has bipolar one.  Depressed and anxiety over the last few weeks.

## 2022-10-14 NOTE — PROGRESS NOTES
10/13/22 1136   General   Admit Date 10/13/22   Primary Diagnosis psychosis & mood d/o   Secondary Diagnosis cannabis abuse   Number of Children 0   Children Living? 0   Occupation shipping/delivery services   Does the patient have dentures? No   If you were to take part in activities, which of the following would you prefer? Both   Do you feel like you have enough to keep you busy now? Yes   Do you believe that you have the opportunity for physical activity? Yes   Activity Capabilities Moderate   Subjective   Patient states My body wants me to be here; Learn how to love   Assessment   Mobility ambulates independently   Transfers independently   Visual Acuity normal vision   Hearing normal   Speech/Communication normal   Cognitive Concerns oriented x4;problem solving;abstract thinking;confusion   Emotional Concerns excessive emotional response;critical of self or others   Leisure Interest Survey   Leisure Interest Survey No   Plan   Planned Therapy Intervention Group Recreational Therapy   Expected Length of Stay 5-7 days   PT Frequency Minimum of 3 visits per week   Jurgen is a 27 male admitted for psychosis & mood d/o with a uds +cannabis. Pt reports ability to perform his ADL's and is interested in MH services.

## 2022-10-14 NOTE — CARE UPDATE
"CM spoke with pt's sister Torrie [phone]    "He's bad bipolar for years now. This all started when he got a new car, it was financially stressful for him and I think it triggered this episode. The last week he hasn't been sleeping. He's been going through a manic episode. He is really upset about this girl, but he never liked her before. I think it's an impact of the manic episode, because before this he never really talked to her.  He hasn't been taking his medicine at home. He just smokes weed. He says the medicine gives him headaches.  I want him to be ok because he had been at [facility] for like 5 hours. He was sitting there and tearful and crying about the girl. We will come to visitation and we can pick him up at discharge."  "
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
## 2022-10-15 PROCEDURE — 11400000 HC PSYCH PRIVATE ROOM

## 2022-10-15 PROCEDURE — 25000003 PHARM REV CODE 250: Performed by: PSYCHIATRY & NEUROLOGY

## 2022-10-15 RX ADMIN — TRAZODONE HYDROCHLORIDE 100 MG: 100 TABLET ORAL at 08:10

## 2022-10-15 RX ADMIN — ARIPIPRAZOLE 10 MG: 5 TABLET ORAL at 08:10

## 2022-10-15 NOTE — GROUP NOTE
Group Psychotherapy       Group Focus: Psychodynamic Group Psychotherapy      Number of patients in attendance: 13    Group Start Time: 1045  Group End Time:  1130  Groups Date: 10/15/2022  Group Topic:  Behavioral Health  Group Department: [facility]  Group Facilitators:  Russell Watts LMSW  _____________________________________________________________________    Patient Name: Jurgen Berg  MRN: [id number]  Patient Class: IP- Psych   Admission Date\Time: 10/13/2022  3:56 AM  Hospital Length of Stay: 2  Primary Care Provider: Primary Doctor No     Referred by: Behavioral Medicine Unit Treatment Team     Target symptoms: Depression and Psychosis     Patient's response to treatment: Active Listening and Self-disclosure     Progress toward goals: Minimal progress     Interval History:        Diagnosis: Psychosis     Plan: Continue treatment on BMU

## 2022-10-15 NOTE — PLAN OF CARE
Problem: Adult Inpatient Plan of Care  Goal: Plan of Care Review  10/15/2022 0401 by Malcom Ramírez RN  Outcome: Ongoing, Progressing  10/15/2022 0401 by Malcom Ramírez RN  Outcome: Ongoing, Progressing  Goal: Patient-Specific Goal (Individualized)  10/15/2022 0401 by Malcom Ramírez RN  Outcome: Ongoing, Progressing  10/15/2022 0401 by Malcom Ramírez RN  Outcome: Ongoing, Progressing  Goal: Absence of Hospital-Acquired Illness or Injury  10/15/2022 0401 by Malcom Ramírez RN  Outcome: Ongoing, Progressing  10/15/2022 0401 by aMlcom Ramírez RN  Outcome: Ongoing, Progressing  Goal: Optimal Comfort and Wellbeing  10/15/2022 0401 by Malcom Ramírez RN  Outcome: Ongoing, Progressing  10/15/2022 0401 by Malcom Ramírez RN  Outcome: Ongoing, Progressing  Goal: Readiness for Transition of Care  10/15/2022 0401 by Malcom Ramírez RN  Outcome: Ongoing, Progressing  10/15/2022 0401 by Malcom Ramírez RN  Outcome: Ongoing, Progressing     Problem: Behavior Regulation Impairment (Psychotic Signs/Symptoms)  Goal: Improved Behavioral Control (Psychotic Signs/Symptoms)  10/15/2022 0401 by Malcom Ramírez RN  Outcome: Ongoing, Progressing  10/15/2022 0401 by Malcom Ramírez RN  Outcome: Ongoing, Progressing     Problem: Cognitive Impairment (Psychotic Signs/Symptoms)  Goal: Optimal Cognitive Function (Psychotic Signs/Symptoms)  10/15/2022 0401 by Malcom Ramírez RN  Outcome: Ongoing, Progressing  10/15/2022 0401 by Malcom Ramírez RN  Outcome: Ongoing, Progressing     Problem: Decreased Participation and Engagement (Psychotic Signs/Symptoms)  Goal: Increased Participation and Engagement (Psychotic Signs/Symptoms)  10/15/2022 0401 by Malcom Ramírez RN  Outcome: Ongoing, Progressing  10/15/2022 0401 by Malcom Ramírez RN  Outcome: Ongoing, Progressing     Problem: Mood Impairment (Psychotic Signs/Symptoms)  Goal: Improved Mood Symptoms (Psychotic  Signs/Symptoms)  10/15/2022 0401 by Malcom Ramírez RN  Outcome: Ongoing, Progressing  10/15/2022 0401 by Malcom Ramírez RN  Outcome: Ongoing, Progressing     Problem: Psychomotor Impairment (Psychotic Signs/Symptoms)  Goal: Improved Psychomotor Symptoms (Psychotic Signs/Symptoms)  10/15/2022 0401 by Maclom Ramírez RN  Outcome: Ongoing, Progressing  10/15/2022 0401 by Malcom Ramírez RN  Outcome: Ongoing, Progressing     Problem: Sensory Perception Impairment (Psychotic Signs/Symptoms)  Goal: Decreased Sensory Symptoms (Psychotic Signs/Symptoms)  10/15/2022 0401 by Malcom Ramírez RN  Outcome: Ongoing, Progressing  10/15/2022 0401 by Malcom Ramírez RN  Outcome: Ongoing, Progressing     Problem: Sleep Disturbance (Psychotic Signs/Symptoms)  Goal: Improved Sleep (Psychotic Signs/Symptoms)  10/15/2022 0401 by Malcom aRmírez RN  Outcome: Ongoing, Progressing  10/15/2022 0401 by Malcom Ramírez RN  Outcome: Ongoing, Progressing     Problem: Social, Occupational or Functional Impairment (Psychotic Signs/Symptoms)  Goal: Enhanced Social, Occupational or Functional Skills (Psychotic Signs/Symptoms)  10/15/2022 0401 by Malcom Ramírez RN  Outcome: Ongoing, Progressing  10/15/2022 0401 by Malcom Ramírez RN  Outcome: Ongoing, Progressing

## 2022-10-16 PROCEDURE — 25000003 PHARM REV CODE 250: Performed by: PSYCHIATRY & NEUROLOGY

## 2022-10-16 PROCEDURE — 11400000 HC PSYCH PRIVATE ROOM

## 2022-10-16 RX ADMIN — HYDROXYZINE HYDROCHLORIDE 50 MG: 50 TABLET, FILM COATED ORAL at 03:10

## 2022-10-16 RX ADMIN — ARIPIPRAZOLE 10 MG: 5 TABLET ORAL at 08:10

## 2022-10-16 RX ADMIN — TRAZODONE HYDROCHLORIDE 100 MG: 100 TABLET ORAL at 08:10

## 2022-10-17 PROCEDURE — 25000003 PHARM REV CODE 250: Performed by: PSYCHIATRY & NEUROLOGY

## 2022-10-17 PROCEDURE — 11400000 HC PSYCH PRIVATE ROOM

## 2022-10-17 RX ORDER — HYDROXYZINE HYDROCHLORIDE 50 MG/1
50 TABLET, FILM COATED ORAL 2 TIMES DAILY
Status: DISCONTINUED | OUTPATIENT
Start: 2022-10-17 | End: 2022-10-19 | Stop reason: HOSPADM

## 2022-10-17 RX ADMIN — HYDROXYZINE HYDROCHLORIDE 50 MG: 50 TABLET, FILM COATED ORAL at 08:10

## 2022-10-17 RX ADMIN — HYDROXYZINE HYDROCHLORIDE 50 MG: 50 TABLET, FILM COATED ORAL at 01:10

## 2022-10-17 RX ADMIN — TRAZODONE HYDROCHLORIDE 100 MG: 100 TABLET ORAL at 08:10

## 2022-10-17 RX ADMIN — ARIPIPRAZOLE 10 MG: 5 TABLET ORAL at 08:10

## 2022-10-17 NOTE — PROGRESS NOTES
10/17/22 1000   [facility] Group Therapy   Group Name Therapeutic Recreation   Specific Interventions Skilled Activity Mild Exercises   Participation Level Active;Supportive;Attentive   Participation Quality Cooperative   Insight/Motivation Limited   Affect/Mood Display Blunted   Cognition Pre-Occupied   Psychomotor WNL

## 2022-10-17 NOTE — PROGRESS NOTES
10/17/22 [address] [place] [address] Group Therapy   Group Name Therapeutic Recreation   Specific Interventions Skilled Activity Creative Expression   Participation Level Active;Supportive;Attentive;Sharing   Participation Quality Cooperative   Insight/Motivation Limited   Affect/Mood Display Blunted   Cognition Pre-Occupied   Psychomotor WNL

## 2022-10-17 NOTE — PROGRESS NOTES
10/16/22 2000   Pain/Comfort/Sleep   Preferred Pain Scale number (Numeric Rating Pain Scale)   Comfort/Acceptable Pain Level 0   Pain Rating (0-10): Rest 0   Pain Rating (0-10): Activity 0   Coping/Psychosocial   Verbalized Emotional State acceptance   Plan of Care Reviewed With patient   Patient Agreement with Plan of Care agrees   Behavioral   General Appearance WDL WDL   General Appearance dress appropriate for weather/appropriate for setting   Behavior WDL   Behavior WDL ex   Interactions guarded   Emotion Mood WDL   Emotion/Mood/Affect WDL WDL   Affect affect consistent with mood   Emotion/Mood calm   Speech WDL   Speech WDL WDL   Perceptual State WDL   Perceptual State WDL ex   Hallucinations denies hallucinations   Perceptual State derealization   Thought Process WDL   Thought Process WDL ex   Judgment and Insight insight not appropriate to situation   Intellectual Performance WDL   Intellectual Performance WDL WDL   Intellectual Performance oriented x 4   Level of Consciousness (AVPU) alert   Cognitive   Cognitive/Neuro/Behavioral WDL ex   Mood/Behavior anxious   Safety   Observed Behavior calm   Safety Measures safety rounds completed   [place] Psychiatric Fall Risk Tool   Age 8-->Less than 50   Mental Status -4-->Fully alert/oriented at all times   Elimination 8-->Independent with control of bowel/bladder   Medications 8-->Psychotropic medications   Diagnosis 10-->Major depressive disorder   Ambulation/Balance 7-->Independent/steady gait/immobile   Nutrition 0-->No apparent abnormalities with appetite   Sleep Disturbance 8-->No sleep disturbance   History of Falls 8-->No history of falls   Score ([place] Fall Risk) 53   ABC Risk for Fall with Injury Assessment   A= Age: Is the patient greater than or equal to 85 years old or frail due to clinical condition? No   B=Bones: Does the patient have osteoporosis, previous fracture, prolonged steroid use, or metastatic bone cancer? No   C=Coagulation  Disorders: Does the patient have a bleeding disorder, either through anticoagulants or underlying clinical condition? No   S=recent Surgery: Is the patient post-op surgicalwith a recent lower limb amputation or recent major abdominal or thoracic surgery? No   [place] Suicide Severity Rating Scale   1. Wish to be Dead: Have you wished you were dead or wished you could go to sleep and not wake up? No   2. Suicidal Thoughts: Have you actually had any thoughts of killing yourself? No   3. Suicidal Thoughts with Method Without Specific Plan or Intent to Act: Have you been thinking about how you might kill yourself? No   4. Suicidal Intent Without Specific Plan: Have you had these thoughts and had some intention of acting on them? No   5. Suicide Intent with Specific Plan: Have you started to work out or worked out the details of how to kill yourself? Do you intend to carry out this plan? No   6. Suicide Behavior Question: Have you ever done anything, started to do anything, or prepared to do anything to end your life? No   Suicide Risk No Risk   Violence Risk   Feels Like Hurting Others no   Previous Attempt to Harm Others no   Violence Threats in Past 6 Months none   Safety Management    Patient Rounds visualized patient;ID band on   Safety Promotion/Fall Prevention nonskid shoes/socks when out of bed   Daily Care   Activity (Behavioral Health) up ad anisa   Positioning   Body Position position changed independently   Nutrition   Intake (%) 0%   Gastrointestinal   GI WDL WDL   Last Bowel Movement 10/14/22   Genitourinary   Genitourinary WDL WDL   Skin   Skin WDL WDL   Aj Risk Assessment   Sensory Perception 4-->no impairment   Moisture 4-->rarely moist   Activity 4-->walks frequently   Mobility 4-->no limitation   Nutrition 3-->adequate   Friction and Shear 3-->no apparent problem   Aj Score 22   RN Clinical Review   I have evaluated the data collected on this patient and nursing care provided. Done

## 2022-10-17 NOTE — PROGRESS NOTES
"10/17/2022 2:01 PM   Jurgen Berg   1995   [id number]        Psychiatry Progress Note     SUBJECTIVE:   Jurgen Berg is a 27 y.o. male placed under a PEC at [facility] after police evicted him from [place] by [employer] due to bizarre behavior.  He states that the weekend was "manageable".  Mood and behavior improving.  He is inquiring about medication for anxiety.  I do not believe that an SSRI or SNRI would be the best option considering his Bipolar disorder.  He has been taking PRN hydroxyzine so we will schedule this.    UDS: (+)cannabinoids  Blood alcohol: <10     Current Medications:   Scheduled Meds:    ARIPiprazole  10 mg Oral Daily    nicotine  1 patch Transdermal Daily      PRN Meds: acetaminophen, aluminum-magnesium hydroxide-simethicone, haloperidoL **AND** diphenhydrAMINE **AND** haloperidol lactate **AND** diphenhydrAMINE **AND** lorazepam, hydrOXYzine HCL, LORazepam, magnesium hydroxide 400 mg/5 ml, magnesium hydroxide 400 mg/5 ml, ondansetron, promethazine, trazodone, trazodone   Psychotherapeutics (From admission, onward)      Start     Stop Route Frequency Ordered    10/14/22 0846  LORazepam tablet 2 mg         -- Oral Every 6 hours PRN 10/14/22 0748    10/14/22 0750  haloperidoL tablet 10 mg  (Med - Acute  Behavioral Management)        Question:  Is the patient competent?  Answer:  Yes   See Hyperspace for full Linked Orders Report.    -- Oral Every 6 hours PRN 10/14/22 0755    10/14/22 0750  haloperidol lactate injection 10 mg  (Med - Acute  Behavioral Management)        See Hyperspace for full Linked Orders Report.    -- IM Every 6 hours PRN 10/14/22 0755    10/14/22 0750  LORazepam injection 2 mg  (Med - Acute  Behavioral Management)        Question:  Is the patient competent?  Answer:  Yes   See Hyperspace for full Linked Orders Report.    -- IM Every 6 hours PRN 10/14/22 0755    10/13/22 1345  ARIPiprazole tablet 10 mg         -- Oral Daily 10/13/22 1243    10/13/22 0438  traZODone tablet 100 mg  " (Order Panel)         -- Oral Nightly PRN 10/13/22 0438    10/13/22 0438  traZODone tablet 100 mg  (Order Panel)        Question:  Is the patient competent?  Answer:  Yes    -- Oral Nightly PRN 10/13/22 0438            Allergies:   Review of patient's allergies indicates:  No Known Allergies     OBJECTIVE:   Vitals   Vitals:    10/17/22 0700   BP: (!) 138/95   Pulse: 83   Resp: 18   Temp: 97.7 °F (36.5 °C)        Labs/Imaging/Studies:   No results found for this or any previous visit (from the past 36 hour(s)).         Medical Review Of Systems:  Constitutional: negative  Respiratory: negative  Cardiovascular: negative  Gastrointestinal: negative  Genitourinary:negative  Musculoskeletal:negative  Neurological: negative      Psychiatric Mental Status Exam:  General Appearance: appears stated age, well-developed, well-nourished  Arousal: alert  Behavior: improving  Movements and Motor Activity: no abnormal involuntary movements noted  Orientation: person, place, time, and situation  Speech: rambles, verbose - improved  Mood: Anxious  Affect: improving  Thought Process: flight of ideas  Associations: intact  Thought Content and Perceptions: grandiose ideations (improving), no suicidal ideation, no homicidal ideation  Recent and Remote Memory: recent memory intact, remote memory intact  Attention and Concentration: intact, attentive to conversation  Fund of Knowledge: intact, aware of current events, vocabulary appropriate  Insight: questionable  Judgment: questionable    ASSESSMENT/PLAN:   Diagnosis:  Bipolar Disorder, most recent episode mixed, severe (F31.63)  Cannabis use disorder (F12.10)    No past medical history on file.     Plan:  -Hydroxyzine 50mg BID    Expected Disposition Plan: Home        Elfego Booth M.D.

## 2022-10-17 NOTE — PLAN OF CARE
Problem: Adult Inpatient Plan of Care  Goal: Plan of Care Review  Outcome: Ongoing, Progressing  Goal: Patient-Specific Goal (Individualized)  Outcome: Ongoing, Progressing  Goal: Absence of Hospital-Acquired Illness or Injury  Outcome: Ongoing, Progressing  Goal: Optimal Comfort and Wellbeing  Outcome: Ongoing, Progressing  Goal: Readiness for Transition of Care  Outcome: Ongoing, Progressing     Problem: Behavior Regulation Impairment (Psychotic Signs/Symptoms)  Goal: Improved Behavioral Control (Psychotic Signs/Symptoms)  Outcome: Ongoing, Progressing     Problem: Cognitive Impairment (Psychotic Signs/Symptoms)  Goal: Optimal Cognitive Function (Psychotic Signs/Symptoms)  Outcome: Ongoing, Progressing     Problem: Decreased Participation and Engagement (Psychotic Signs/Symptoms)  Goal: Increased Participation and Engagement (Psychotic Signs/Symptoms)  Outcome: Ongoing, Progressing     Problem: Mood Impairment (Psychotic Signs/Symptoms)  Goal: Improved Mood Symptoms (Psychotic Signs/Symptoms)  Outcome: Ongoing, Progressing     Problem: Psychomotor Impairment (Psychotic Signs/Symptoms)  Goal: Improved Psychomotor Symptoms (Psychotic Signs/Symptoms)  Outcome: Ongoing, Progressing     Problem: Sensory Perception Impairment (Psychotic Signs/Symptoms)  Goal: Decreased Sensory Symptoms (Psychotic Signs/Symptoms)  Outcome: Ongoing, Progressing     Problem: Sleep Disturbance (Psychotic Signs/Symptoms)  Goal: Improved Sleep (Psychotic Signs/Symptoms)  Outcome: Ongoing, Progressing     Problem: Social, Occupational or Functional Impairment (Psychotic Signs/Symptoms)  Goal: Enhanced Social, Occupational or Functional Skills (Psychotic Signs/Symptoms)  Outcome: Ongoing, Progressing     Problem: Violence Risk or Actual  Goal: Anger and Impulse Control  Outcome: Ongoing, Progressing

## 2022-10-17 NOTE — PROGRESS NOTES
10/17/22 1000   Pain/Comfort/Sleep   Preferred Pain Scale number (Numeric Rating Pain Scale)   Pain Rating (0-10): Rest 0   Behavioral   General Appearance WDL WDL   General Appearance dress appropriate for weather/appropriate for setting   Behavior WDL   Behavior WDL ex   Interactions guarded;appropriate to situation;cooperative   Motor Movement no unusual gestures/mannerisms   Emotion Mood WDL   Emotion/Mood/Affect WDL ex   Affect affect consistent with mood   Emotion/Mood sad;calm   Speech WDL   Speech WDL WDL   Speech clear, coherent   Perceptual State WDL   Perceptual State WDL ex   Hallucinations denies hallucinations   Perceptual State derealization   Thought Process WDL   Thought Process WDL ex   Delusions no delusions   Judgment and Insight insight not appropriate to situation   Thought Process illogical   Intellectual Performance WDL   Intellectual Performance WDL WDL   Intellectual Performance oriented x 4   Level of Consciousness (AVPU) alert   Cognitive   Cognitive/Neuro/Behavioral WDL WDL   Safety   Observed Behavior calm   [place] Psychiatric Fall Risk Tool   Age 8-->Less than 50   Mental Status -4-->Fully alert/oriented at all times   Elimination 8-->Independent with control of bowel/bladder   Medications 8-->Psychotropic medications   Diagnosis 10-->Bipolar/schizoaffective disorder   Ambulation/Balance 7-->Independent/steady gait/immobile   Nutrition 0-->No apparent abnormalities with appetite   Sleep Disturbance 8-->No sleep disturbance   History of Falls 8-->No history of falls   Score ([place] Fall Risk) 53   Violence Risk   Feels Like Hurting Others no   Safety Management    Patient Rounds ID band on;visualized patient   Safety Promotion/Fall Prevention nonskid shoes/socks when out of bed   Positioning   Body Position position changed independently   Gastrointestinal   GI WDL WDL   Genitourinary   Genitourinary WDL WDL   Skin   Skin WDL WDL   Aj Risk Assessment   Sensory Perception  4-->no impairment   Moisture 4-->rarely moist   Activity 4-->walks frequently   Mobility 4-->no limitation   Nutrition 3-->adequate   Friction and Shear 3-->no apparent problem   Aj Score 22

## 2022-10-18 VITALS
RESPIRATION RATE: 18 BRPM | SYSTOLIC BLOOD PRESSURE: 140 MMHG | WEIGHT: 116.38 LBS | TEMPERATURE: 98 F | HEART RATE: 85 BPM | BODY MASS INDEX: 19.39 KG/M2 | HEIGHT: 65 IN | OXYGEN SATURATION: 97 % | DIASTOLIC BLOOD PRESSURE: 92 MMHG

## 2022-10-18 PROBLEM — F31.63: Status: ACTIVE | Noted: 2022-10-18

## 2022-10-18 PROCEDURE — 25000003 PHARM REV CODE 250: Performed by: PSYCHIATRY & NEUROLOGY

## 2022-10-18 PROCEDURE — 11400000 HC PSYCH PRIVATE ROOM

## 2022-10-18 RX ORDER — HYDROXYZINE HYDROCHLORIDE 50 MG/1
50 TABLET, FILM COATED ORAL 2 TIMES DAILY
Qty: 60 TABLET | Refills: 0 | Status: ON HOLD | OUTPATIENT
Start: 2022-10-18 | End: 2023-09-14

## 2022-10-18 RX ORDER — ARIPIPRAZOLE 10 MG/1
10 TABLET ORAL DAILY
Qty: 30 TABLET | Refills: 11 | Status: ON HOLD | OUTPATIENT
Start: 2022-10-19 | End: 2023-09-14

## 2022-10-18 RX ADMIN — HYDROXYZINE HYDROCHLORIDE 50 MG: 50 TABLET, FILM COATED ORAL at 08:10

## 2022-10-18 RX ADMIN — ARIPIPRAZOLE 10 MG: 5 TABLET ORAL at 08:10

## 2022-10-18 RX ADMIN — TRAZODONE HYDROCHLORIDE 100 MG: 100 TABLET ORAL at 08:10

## 2022-10-18 NOTE — DISCHARGE SUMMARY
"DISCHARGE SUMMARY  PSYCHIATRY      Admit Date: 10/13/2022  3:56 AM    Discharge Date:  10/18/2022    SITE:   [facility]    Discharge Attending Physician: Elfego Booth MD    History of Present Illness On Admit:   Jurgen Berg is a 27 y.o. male placed under a PEC at [facility] after police evicted him from [facility] by [employer] due to bizarre behavior.  He was apparently providing vague and ambiguous answers to questions in the ED.  He states that he was at [facility] because he is in love with a girl and she was supposed to be meeting her at the park.  When she didn't show up, he screamed to the top of his lungs.  He reports that he does not need to be here, but states that he has "Bipolar 1 and has been through many manias."  He states that he is "Unipolar, which is why he was able to scream at the top of his lungs and then act normal like an actor."  Appears acutely manic.  States that he will refuse treatment "because he doesn't need it."  Poor insight.  Poor judgment.  States that he takes care of his mother but also states that she was babysitting someone while he was at the park.  Will admit for medication management and behavior monitoring.    Diagnoses:  PRINCIPAL PROBLEM: <principal problem not specified>    PROBLEM LIST    * No active hospital problems. *      Discharged Condition: Stable    Hospital Course:   Patient was admitted to [facility] and started on Abilify 5mg daily and titrated to 10mg daily. During his stay he displayed poor insight and judgement that improved throughout the stay. Patient was calm and cooperative throughout the visit. Patient was compliant with all medications and cooperative with staff. .During the program course he was educated on the dynamic aspects of his disorder.  Individual and group psychotherapy sessions focused on disease process, symptom management, positive coping skills, healthy living skills, leisure skills, and chemical " dependency issues. Nursing groups focused of medications and the importance of both medication and treatment compliance.  The patient achieved maximum benefit of inpatient hospitalization and at this level of functioning was discharged to his home with his mother.  His mother has agreed to pick him up and bring him home.     Disposition: discharged to home      DISCHARGE EXAMINATION    VITALS   Vitals:    10/16/22 1900 10/17/22 0700 10/18/22 0800 10/18/22 1053   BP: (!) 138/91 (!) 138/95 (!) 147/115 (!) 140/92   BP Location: Left arm  Left arm    Patient Position: Sitting  Sitting    Pulse: 77 83 85    Resp: 16 18 18    Temp: 98.2 °F (36.8 °C) 97.7 °F (36.5 °C) 98.1 °F (36.7 °C)    TempSrc: Oral  Oral    SpO2: 97% 97% 97%    Weight:       Height:             General Appearance: appears stated age, well developed and nourished, adequately groomed and appropriately dressed, in no acute distress  Arousal: alert with clear sensorium  Behavior: normal; cooperative; reasonably friendly, pleasant, and polite; appropriate eye-contact; under good behavioral control  Movements and Motor Activity: no abnormal involuntary movements noted; no tics, no tremors, no akathisia, no dystonia, no evidence of tardive dyskinesia; no psychomotor agitation or retardation  Orientation: intact; oriented fully to person, place, time and situation  Speech: intact; normal rate, rhythm, volume, tone and pitch; conversational, spontaneous, and coherent  Mood: Anxious  Affect: normal, euthymic, reactive, full-range, mood-congruent, appropriate to situation and context  Thought Process: linear, goal-directed, illogical  Associations: intact  Thought Content and Perceptions: no suicidal ideation, no homicidal ideation, + delusions, no hallucinations  Recent and Remote Memory: grossly intact, able to recall relevant and salient information from the recent and remote past  Attention and Concentration: grossly intact, attentive to the conversation and  not readily distractible  Fund of Knowledge: grossly intact, used appropriate vocabulary and demonstrated an awareness of current events, impaired  Insight: questionable  Judgment: questionable      Medication Regimen:    Current Facility-Administered Medications:     acetaminophen tablet 650 mg, 650 mg, Oral, Q6H PRN, Elfego Booth MD    aluminum-magnesium hydroxide-simethicone 200-200-20 mg/5 mL suspension 30 mL, 30 mL, Oral, Q6H PRN, Elfego Booth MD    ARIPiprazole tablet 10 mg, 10 mg, Oral, Daily, Elfego Booth MD, 10 mg at 10/18/22 0831    haloperidoL tablet 10 mg, 10 mg, Oral, Q6H PRN **AND** diphenhydrAMINE capsule 50 mg, 50 mg, Oral, Q6H PRN **AND** haloperidol lactate injection 10 mg, 10 mg, Intramuscular, Q6H PRN **AND** diphenhydrAMINE injection 50 mg, 50 mg, Intramuscular, Q6H PRN **AND** LORazepam injection 2 mg, 2 mg, Intramuscular, Q6H PRN, Elfego Booth MD    hydrOXYzine tablet 50 mg, 50 mg, Oral, Q4H PRN, Elfego Booth MD, 50 mg at 10/17/22 0148    hydrOXYzine tablet 50 mg, 50 mg, Oral, BID, Elfego Booth MD, 50 mg at 10/18/22 0831    LORazepam tablet 2 mg, 2 mg, Oral, Q6H PRN, Elfego Booth MD    magnesium hydroxide 400 mg/5 ml suspension 2,400 mg, 30 mL, Oral, Daily PRN, Elfego Booth MD    magnesium hydroxide 400 mg/5 ml suspension 2,400 mg, 30 mL, Oral, Daily PRN, Elfego Booth MD    ondansetron disintegrating tablet 4 mg, 4 mg, Oral, Q8H PRN, Elfego Booth MD    promethazine tablet 25 mg, 25 mg, Oral, Q6H PRN, Elfego Booth MD    traZODone tablet 100 mg, 100 mg, Oral, Nightly PRN, Elfego Booth MD, 100 mg at 10/16/22 2018    traZODone tablet 100 mg, 100 mg, Oral, Nightly PRN, Elfego Booth MD, 100 mg at 10/17/22 2011      Patient Instructions:   Continue medication regimen as prescribed.    Disposition plan per  - see  notes for details.    Patient instructed to  call 911 or present to emergency department if any of the following complications develop status post discharge: suicidality, homicidality, or grave disability.     Total time spent discharging patient: <30 minutes      Marco Antonio GARNER

## 2022-10-18 NOTE — PROGRESS NOTES
"10/18/2022 2:01 PM   Jurgen Berg   1995   [id number]        Psychiatry Progress Note     SUBJECTIVE:   Jurgen Berg is a 27 y.o. male placed under a PEC at [facility] after police evicted him from [employer] by [employer] due to bizarre behavior.  He states that he feels more "clear and able to speak better".  Mood and behavior continuing improve.  He is inquiring about his discharge date and that this is giving him some anxiety. He remains compliant with medications.  He states that the hydroxyzine is helping his anxiety. He denies any s/e of his medications at this time.     UDS: (+)cannabinoids  Blood alcohol: <10     Current Medications:   Scheduled Meds:    ARIPiprazole  10 mg Oral Daily    hydrOXYzine HCL  50 mg Oral BID      PRN Meds: acetaminophen, aluminum-magnesium hydroxide-simethicone, haloperidoL **AND** diphenhydrAMINE **AND** haloperidol lactate **AND** diphenhydrAMINE **AND** lorazepam, hydrOXYzine HCL, LORazepam, magnesium hydroxide 400 mg/5 ml, magnesium hydroxide 400 mg/5 ml, ondansetron, promethazine, trazodone, trazodone   Psychotherapeutics (From admission, onward)      Start     Stop Route Frequency Ordered    10/14/22 0846  LORazepam tablet 2 mg         -- Oral Every 6 hours PRN 10/14/22 0748    10/14/22 0750  haloperidoL tablet 10 mg  (Med - Acute  Behavioral Management)        Question:  Is the patient competent?  Answer:  Yes   See Hyperspace for full Linked Orders Report.    -- Oral Every 6 hours PRN 10/14/22 0755    10/14/22 0750  haloperidol lactate injection 10 mg  (Med - Acute  Behavioral Management)        See Hyperspace for full Linked Orders Report.    -- IM Every 6 hours PRN 10/14/22 0755    10/14/22 0750  LORazepam injection 2 mg  (Med - Acute  Behavioral Management)        Question:  Is the patient competent?  Answer:  Yes   See Hyperspace for full Linked Orders Report.    -- IM Every 6 hours PRN 10/14/22 0755    10/13/22 1345  ARIPiprazole tablet 10 mg         -- Oral Daily 10/13/22 1243 "    10/13/22 0438  traZODone tablet 100 mg  (Order Panel)         -- Oral Nightly PRN 10/13/22 0438    10/13/22 0438  traZODone tablet 100 mg  (Order Panel)        Question:  Is the patient competent?  Answer:  Yes    -- Oral Nightly PRN 10/13/22 0438            Allergies:   Review of patient's allergies indicates:  No Known Allergies     OBJECTIVE:   Vitals   Vitals:    10/17/22 0700   BP: (!) 138/95   Pulse: 83   Resp: 18   Temp: 97.7 °F (36.5 °C)        Labs/Imaging/Studies:   No results found for this or any previous visit (from the past 36 hour(s)).         Medical Review Of Systems:  Constitutional: negative  Respiratory: negative  Cardiovascular: negative  Gastrointestinal: negative  Genitourinary:negative  Musculoskeletal:negative  Neurological: negative      Psychiatric Mental Status Exam:  General Appearance: appears stated age, well-developed, well-nourished  Arousal: alert  Behavior: improving  Movements and Motor Activity: no abnormal involuntary movements noted  Orientation: person, place, time, and situation  Speech: rambles, verbose - improved  Mood: Anxious  Affect: improving  Thought Process: flight of ideas  Associations: intact  Thought Content and Perceptions: grandiose ideations (improving), no suicidal ideation, no homicidal ideation  Recent and Remote Memory: recent memory intact, remote memory intact  Attention and Concentration: intact, attentive to conversation  Fund of Knowledge: intact, aware of current events, vocabulary appropriate  Insight: questionable  Judgment: questionable    ASSESSMENT/PLAN:   Diagnosis:  Bipolar Disorder, most recent episode mixed, severe (F31.63)  Cannabis use disorder (F12.10)    No past medical history on file.     Plan:  -Continue Hydroxyzine 50mg BID    Expected Disposition Plan: Home        JACQUE Kapoor-BC

## 2022-10-18 NOTE — PLAN OF CARE
Problem: Behavior Regulation Impairment (Psychotic Signs/Symptoms)  Goal: Improved Behavioral Control (Psychotic Signs/Symptoms)  Outcome: Ongoing, Progressing     Problem: Cognitive Impairment (Psychotic Signs/Symptoms)  Goal: Optimal Cognitive Function (Psychotic Signs/Symptoms)  Outcome: Ongoing, Progressing     Problem: Decreased Participation and Engagement (Psychotic Signs/Symptoms)  Goal: Increased Participation and Engagement (Psychotic Signs/Symptoms)  Outcome: Ongoing, Progressing     Problem: Mood Impairment (Psychotic Signs/Symptoms)  Goal: Improved Mood Symptoms (Psychotic Signs/Symptoms)  Outcome: Ongoing, Progressing     Problem: Psychomotor Impairment (Psychotic Signs/Symptoms)  Goal: Improved Psychomotor Symptoms (Psychotic Signs/Symptoms)  Outcome: Ongoing, Progressing     Problem: Sensory Perception Impairment (Psychotic Signs/Symptoms)  Goal: Decreased Sensory Symptoms (Psychotic Signs/Symptoms)  Outcome: Ongoing, Progressing     Problem: Sleep Disturbance (Psychotic Signs/Symptoms)  Goal: Improved Sleep (Psychotic Signs/Symptoms)  Outcome: Ongoing, Progressing     Problem: Social, Occupational or Functional Impairment (Psychotic Signs/Symptoms)  Goal: Enhanced Social, Occupational or Functional Skills (Psychotic Signs/Symptoms)  Outcome: Ongoing, Progressing     Problem: Violence Risk or Actual  Goal: Anger and Impulse Control  Outcome: Ongoing, Progressing

## 2022-10-18 NOTE — GROUP NOTE
Group Psychotherapy       Group Focus: Psychodynamic Group Psychotherapy      Number of patients in attendance: 19    Group Start Time: 1045  Group End Time:  1115  Groups Date: 10/18/2022  Group Topic:  Behavioral Health  Group Department: [facility] Behavioral Health Unit  Group Facilitators:  Max Lund LCSW  _____________________________________________________________________    Patient Name: Jurgen Berg  MRN: [id number]  Patient Class: IP- Psych   Admission Date\Time: 10/13/2022  3:56 AM  Hospital Length of Stay: 5  Primary Care Provider: Primary Doctor No     Referred by: Acute Psychiatry Unit Treatment Team     Target symptoms: Mood Disorder and Psychosis     Patient's response to treatment: Active Listening     Progress toward goals: Progressing well        Plan: Continue treatment on APU

## 2022-10-18 NOTE — PROGRESS NOTES
10/18/22 1000   [facility] Group Therapy   Group Name Therapeutic Recreation   Specific Interventions Skilled Activity Mild Exercises   Participation Level Active;Supportive;Appropriate;Attentive   Participation Quality Cooperative   Insight/Motivation Limited   Affect/Mood Display Anxious;Blunted   Cognition Pre-Occupied   Psychomotor WNL

## 2022-10-18 NOTE — PROGRESS NOTES
10/18/22 [address] [place] [address] Group Therapy   Group Name Therapeutic Recreation   Specific Interventions Skilled Activity Creative Expression   Participation Level Active;Supportive;Appropriate;Attentive   Participation Quality Cooperative   Insight/Motivation Limited   Affect/Mood Display Anxious;Blunted   Cognition Pre-Occupied   Psychomotor WNL

## 2022-10-19 PROCEDURE — 25000003 PHARM REV CODE 250: Performed by: PSYCHIATRY & NEUROLOGY

## 2022-10-19 RX ADMIN — ARIPIPRAZOLE 10 MG: 5 TABLET ORAL at 08:10

## 2022-10-19 RX ADMIN — HYDROXYZINE HYDROCHLORIDE 50 MG: 50 TABLET, FILM COATED ORAL at 08:10

## 2022-10-19 NOTE — PLAN OF CARE
Problem: Behavior Regulation Impairment (Psychotic Signs/Symptoms)  Goal: Improved Behavioral Control (Psychotic Signs/Symptoms)  Outcome: Met     Problem: Cognitive Impairment (Psychotic Signs/Symptoms)  Goal: Optimal Cognitive Function (Psychotic Signs/Symptoms)  Outcome: Met     Problem: Decreased Participation and Engagement (Psychotic Signs/Symptoms)  Goal: Increased Participation and Engagement (Psychotic Signs/Symptoms)  Outcome: Met     Problem: Mood Impairment (Psychotic Signs/Symptoms)  Goal: Improved Mood Symptoms (Psychotic Signs/Symptoms)  Outcome: Met     Problem: Psychomotor Impairment (Psychotic Signs/Symptoms)  Goal: Improved Psychomotor Symptoms (Psychotic Signs/Symptoms)  Outcome: Met     Problem: Sensory Perception Impairment (Psychotic Signs/Symptoms)  Goal: Decreased Sensory Symptoms (Psychotic Signs/Symptoms)  Outcome: Met     Problem: Social, Occupational or Functional Impairment (Psychotic Signs/Symptoms)  Goal: Enhanced Social, Occupational or Functional Skills (Psychotic Signs/Symptoms)  Outcome: Met     Problem: Violence Risk or Actual  Goal: Anger and Impulse Control  Outcome: Met

## 2022-10-19 NOTE — NURSING
Patient discharged via private car. Discharge paperwork explained and given to patient. Verbalized understanding of disch instructions. All belongings returned to patient. Escorted out of building by [employer].

## 2023-09-09 ENCOUNTER — HOSPITAL ENCOUNTER (INPATIENT)
Facility: HOSPITAL | Age: 28
LOS: 6 days | Discharge: HOME | DRG: 885 | End: 2023-09-15
Attending: PSYCHIATRY & NEUROLOGY | Admitting: PSYCHIATRY & NEUROLOGY
Payer: MEDICAID

## 2023-09-09 DIAGNOSIS — F31.9 BIPOLAR DISORDER: ICD-10-CM

## 2023-09-09 PROCEDURE — 12400001 HC PSYCH SEMI-PRIVATE ROOM

## 2023-09-09 RX ORDER — HYDROXYZINE HYDROCHLORIDE 50 MG/1
50 TABLET, FILM COATED ORAL EVERY 4 HOURS PRN
Status: DISCONTINUED | OUTPATIENT
Start: 2023-09-09 | End: 2023-09-15 | Stop reason: HOSPADM

## 2023-09-09 RX ORDER — LORAZEPAM 1 MG/1
2 TABLET ORAL EVERY 6 HOURS PRN
Status: DISCONTINUED | OUTPATIENT
Start: 2023-09-09 | End: 2023-09-15 | Stop reason: HOSPADM

## 2023-09-09 RX ORDER — IBUPROFEN 200 MG
1 TABLET ORAL DAILY
Status: DISCONTINUED | OUTPATIENT
Start: 2023-09-10 | End: 2023-09-15 | Stop reason: HOSPADM

## 2023-09-09 RX ORDER — DIPHENHYDRAMINE HCL 50 MG
50 CAPSULE ORAL EVERY 6 HOURS PRN
Status: DISCONTINUED | OUTPATIENT
Start: 2023-09-09 | End: 2023-09-15 | Stop reason: HOSPADM

## 2023-09-09 RX ORDER — HALOPERIDOL 5 MG/ML
10 INJECTION INTRAMUSCULAR EVERY 6 HOURS PRN
Status: DISCONTINUED | OUTPATIENT
Start: 2023-09-09 | End: 2023-09-15 | Stop reason: HOSPADM

## 2023-09-09 RX ORDER — TRAZODONE HYDROCHLORIDE 100 MG/1
100 TABLET ORAL NIGHTLY PRN
Status: DISCONTINUED | OUTPATIENT
Start: 2023-09-09 | End: 2023-09-15 | Stop reason: HOSPADM

## 2023-09-09 RX ORDER — ACETAMINOPHEN 325 MG/1
650 TABLET ORAL EVERY 6 HOURS PRN
Status: DISCONTINUED | OUTPATIENT
Start: 2023-09-09 | End: 2023-09-15 | Stop reason: HOSPADM

## 2023-09-09 RX ORDER — PROMETHAZINE HYDROCHLORIDE 12.5 MG/1
12.5 TABLET ORAL EVERY 6 HOURS PRN
Status: DISCONTINUED | OUTPATIENT
Start: 2023-09-09 | End: 2023-09-15 | Stop reason: HOSPADM

## 2023-09-09 RX ORDER — DIPHENHYDRAMINE HYDROCHLORIDE 50 MG/ML
50 INJECTION INTRAMUSCULAR; INTRAVENOUS EVERY 6 HOURS PRN
Status: DISCONTINUED | OUTPATIENT
Start: 2023-09-09 | End: 2023-09-15 | Stop reason: HOSPADM

## 2023-09-09 RX ORDER — LORAZEPAM 2 MG/ML
2 INJECTION INTRAMUSCULAR EVERY 6 HOURS PRN
Status: DISCONTINUED | OUTPATIENT
Start: 2023-09-09 | End: 2023-09-15 | Stop reason: HOSPADM

## 2023-09-09 RX ORDER — MAG HYDROX/ALUMINUM HYD/SIMETH 200-200-20
30 SUSPENSION, ORAL (FINAL DOSE FORM) ORAL EVERY 6 HOURS PRN
Status: DISCONTINUED | OUTPATIENT
Start: 2023-09-09 | End: 2023-09-15 | Stop reason: HOSPADM

## 2023-09-09 RX ORDER — ADHESIVE BANDAGE
30 BANDAGE TOPICAL DAILY PRN
Status: DISCONTINUED | OUTPATIENT
Start: 2023-09-09 | End: 2023-09-15 | Stop reason: HOSPADM

## 2023-09-09 RX ORDER — HALOPERIDOL 5 MG/1
10 TABLET ORAL EVERY 6 HOURS PRN
Status: DISCONTINUED | OUTPATIENT
Start: 2023-09-09 | End: 2023-09-15 | Stop reason: HOSPADM

## 2023-09-09 RX ORDER — ONDANSETRON 4 MG/1
4 TABLET, ORALLY DISINTEGRATING ORAL EVERY 6 HOURS PRN
Status: DISCONTINUED | OUTPATIENT
Start: 2023-09-09 | End: 2023-09-15 | Stop reason: HOSPADM

## 2023-09-09 NOTE — PLAN OF CARE
Problem: Adult Inpatient Plan of Care  Goal: Plan of Care Review  Outcome: Ongoing, Not Progressing  Goal: Patient-Specific Goal (Individualized)  Outcome: Ongoing, Not Progressing  Goal: Absence of Hospital-Acquired Illness or Injury  Outcome: Ongoing, Not Progressing  Goal: Optimal Comfort and Wellbeing  Outcome: Ongoing, Not Progressing  Goal: Readiness for Transition of Care  Outcome: Ongoing, Not Progressing     Problem: Manic Behavior Episode  Goal: Decreased Manic Symptoms  Outcome: Ongoing, Not Progressing     Problem: Behavior Regulation Impairment (Psychotic Signs/Symptoms)  Goal: Improved Behavioral Control (Psychotic Signs/Symptoms)  Outcome: Ongoing, Not Progressing     Problem: Cognitive Impairment (Psychotic Signs/Symptoms)  Goal: Optimal Cognitive Function (Psychotic Signs/Symptoms)  Outcome: Ongoing, Not Progressing     Problem: Decreased Participation and Engagement (Psychotic Signs/Symptoms)  Goal: Increased Participation and Engagement (Psychotic Signs/Symptoms)  Outcome: Ongoing, Not Progressing     Problem: Mood Impairment (Psychotic Signs/Symptoms)  Goal: Improved Mood Symptoms (Psychotic Signs/Symptoms)  Outcome: Ongoing, Not Progressing     Problem: Psychomotor Impairment (Psychotic Signs/Symptoms)  Goal: Improved Psychomotor Symptoms (Psychotic Signs/Symptoms)  Outcome: Ongoing, Not Progressing     Problem: Sensory Perception Impairment (Psychotic Signs/Symptoms)  Goal: Decreased Sensory Symptoms (Psychotic Signs/Symptoms)  Outcome: Ongoing, Not Progressing     Problem: Sleep Disturbance (Psychotic Signs/Symptoms)  Goal: Improved Sleep (Psychotic Signs/Symptoms)  Outcome: Ongoing, Not Progressing     Problem: Social, Occupational or Functional Impairment (Psychotic Signs/Symptoms)  Goal: Enhanced Social, Occupational or Functional Skills (Psychotic Signs/Symptoms)  Outcome: Ongoing, Not Progressing     Problem: Activity and Energy Impairment (Excessive Substance Use)  Goal: Optimized  Energy Level (Excessive Substance Use)  Outcome: Ongoing, Not Progressing     Problem: Behavior Regulation Impairment (Excessive Substance Use)  Goal: Improved Behavioral Control (Excessive Substance Use)  Outcome: Ongoing, Not Progressing     Problem: Decreased Participation and Engagement (Excessive Substance Use)  Goal: Increased Participation and Engagement (Excessive Substance Use)  Outcome: Ongoing, Not Progressing     Problem: Physiologic Impairment (Excessive Substance Use)  Goal: Improved Physiologic Symptoms (Excessive Substance Use)  Outcome: Ongoing, Not Progressing     Problem: Social, Occupational or Functional Impairment (Excessive Substance Use)  Goal: Enhanced Social, Occupational or Functional Skills (Excessive Substance Use)  Outcome: Ongoing, Not Progressing

## 2023-09-10 PROCEDURE — 12400001 HC PSYCH SEMI-PRIVATE ROOM

## 2023-09-10 PROCEDURE — 25000003 PHARM REV CODE 250: Performed by: NURSE PRACTITIONER

## 2023-09-10 RX ORDER — RISPERIDONE 1 MG/1
1 TABLET ORAL NIGHTLY
Status: DISCONTINUED | OUTPATIENT
Start: 2023-09-10 | End: 2023-09-11

## 2023-09-10 RX ADMIN — DIPHENHYDRAMINE HYDROCHLORIDE 50 MG: 50 CAPSULE ORAL at 03:09

## 2023-09-10 RX ADMIN — HALOPERIDOL 10 MG: 5 TABLET ORAL at 03:09

## 2023-09-10 RX ADMIN — LORAZEPAM 2 MG: 1 TABLET ORAL at 03:09

## 2023-09-10 NOTE — NURSING
"Daily Nursing Note:      Behavior:    Patient (Jurgen Berg is a 28 y.o. male, : 1995, MRN: [id number]) demonstrating an affect that was anxious. Jurgen demonstrating mood that is anxious. Jurgen had an appearance that was clean. Jurgen denies suicidal ideation. Jurgen denies suicide plan. Jurgen denies homicidal ideation. Jurgen denies hallucinations.    Jurgen's  height is 5' 5" (1.651 m) and weight is 54.7 kg (120 lb 9.5 oz). His temperature is 97.5 °F (36.4 °C). His blood pressure is 138/98 (abnormal) and his pulse is 80. His respiration is 18.     Blancas last BM was noted on: _2023______      Intervention:    Encourage Jurgen to perform self-hygiene, grooming, and changing of clothing. Monitor Jurgen's behavior and program compliance. Monitor Jurgen for suicidal ideation, homicidal ideation, sleep disturbance, and hallucinations. Encourage Jurgen to eat all portions of meals and assess for meal preferences. Monitor Jurgen for intake and output to ensure hydration. Notify the Physician/Physician Assistant/Advance Practice Registered Nurse (MD/PA/APRN) for any medication refusal and any change in patient condition.      Response:    Jurgen verbalizes understand of unit process and procedures. Jurgen reported medications   No medications._____.      Plan:     Continue to monitor per MD/PA/APRN orders; maintain patient safety.    "
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
## 2023-09-10 NOTE — NURSING
"PRN Medication Follow-up Note:    Behavior:    Patient (Jurgen Berg is a 28 y.o. male, : 1995, MRN: [id number])     Allergies: Patient has no known allergies.    Blancas  height is 5' 5" (1.651 m) and weight is 54.7 kg (120 lb 9.5 oz). His temperature is 97.5 °F (36.4 °C). His blood pressure is 138/98 (abnormal) and his pulse is 80. His respiration is 18.     Administered Ativan 2 mg PO PRN, Haldol 10 mg PO PRN, and Benadryl 50 mg PO PRN per physician order to Jurgen       Intervention:    Intervention to Jurgen's response: decrease in agitation and psychosis.      Response:    Jurgen's response: decrease in agitation and psychosis.      Plan:     Continue to monitor per MD/PA/APRN orders; and reevaluate medication effectiveness within 30 minutes.    "

## 2023-09-10 NOTE — H&P
[facility]  History & Physical    Subjective:      Chief Complaint/Reason for Admission: bipolar with angelo    Jurgen Berg is a 28 y.o. male. Off his meds     Past Medical History:   Diagnosis Date    Bipolar disorder      No past surgical history on file.  No family history on file.  Social History     Substance Use Topics    Alcohol use: Not Currently    Drug use: Yes     Types: Marijuana       PTA Medications   Medication Sig    ARIPiprazole (ABILIFY) 10 MG Tab Take 1 tablet (10 mg total) by mouth once daily.    divalproex ER (DEPAKOTE) 500 MG Tb24 Take 1 tablet (500 mg total) by mouth every 12 (twelve) hours.    hydrOXYzine (ATARAX) 50 MG tablet Take 1 tablet (50 mg total) by mouth 2 (two) times a day.    traZODone (DESYREL) 50 MG tablet Take 1 tablet (50 mg total) by mouth every evening.     Review of patient's allergies indicates:  No Known Allergies     Review of Systems   Constitutional: Negative.    HENT: Negative.     Eyes: Negative.    Respiratory: Negative.     Cardiovascular: Negative.    Gastrointestinal: Negative.    Genitourinary: Negative.    Musculoskeletal: Negative.    Skin: Negative.    Neurological: Negative.    Endo/Heme/Allergies: Negative.    Psychiatric/Behavioral:  Positive for depression and suicidal ideas. Negative for hallucinations and substance abuse. The patient is nervous/anxious.        Objective:      Vital Signs (Most Recent)  Temp: 97.5 °F (36.4 °C) (09/10/23 0701)  Pulse: 80 (09/10/23 0701)  Resp: 18 (09/10/23 0701)  BP: (!) 138/98 (09/10/23 0701)    Vital Signs Range (Last 24H):  Temp:  [97.5 °F (36.4 °C)-100.2 °F (37.9 °C)]   Pulse:  [54-84]   Resp:  [16-20]   BP: (120-138)/(70-98)   SpO2:  [99 %-100 %]     Physical Exam  HENT:      Head: Normocephalic.      Right Ear: Tympanic membrane normal.      Left Ear: Tympanic membrane normal.      Nose: Nose normal.      Mouth/Throat:      Mouth: Mucous membranes are moist.   Eyes:       Extraocular Movements: Extraocular movements intact.      Pupils: Pupils are equal, round, and reactive to light.   Cardiovascular:      Rate and Rhythm: Normal rate and regular rhythm.   Pulmonary:      Effort: Pulmonary effort is normal.   Abdominal:      General: Abdomen is flat.   Musculoskeletal:         General: Normal range of motion.   Skin:     General: Skin is warm.   Neurological:      General: No focal deficit present.      Mental Status: He is alert and oriented to person, place, and time.      Comments: Vision normal   Hearing normal   EOM intact   Face muscles normal  Facial sensation normal   Shrugs shoulders  Tongue midline            Data Review:    Recent Results (from the past 48 hour(s))   Comprehensive metabolic panel    Collection Time: 09/09/23 11:01 AM   Result Value Ref Range    Sodium Level 139 136 - 145 mmol/L    Potassium Level 3.7 3.5 - 5.1 mmol/L    Chloride 102 98 - 107 mmol/L    Carbon Dioxide 23 22 - 29 mmol/L    Glucose Level 117 (H) 74 - 100 mg/dL    Blood Urea Nitrogen 15.7 8.9 - 20.6 mg/dL    Creatinine 1.11 0.73 - 1.18 mg/dL    Calcium Level Total 9.9 8.4 - 10.2 mg/dL    Protein Total 8.1 6.4 - 8.3 gm/dL    Albumin Level 4.7 3.5 - 5.0 g/dL    Globulin 3.4 2.4 - 3.5 gm/dL    Albumin/Globulin Ratio 1.4 1.1 - 2.0 ratio    Bilirubin Total 2.2 (H) <=1.5 mg/dL    Alkaline Phosphatase 59 40 - 150 unit/L    Alanine Aminotransferase 15 0 - 55 unit/L    Aspartate Aminotransferase 16 5 - 34 unit/L    eGFR >60 mls/min/1.73/m2   TSH    Collection Time: 09/09/23 11:01 AM   Result Value Ref Range    Thyroid Stimulating Hormone 1.798 0.350 - 4.940 uIU/mL   Ethanol    Collection Time: 09/09/23 11:01 AM   Result Value Ref Range    Ethanol Level <10.0 <=10.0 mg/dL   Acetaminophen level    Collection Time: 09/09/23 11:01 AM   Result Value Ref Range    Acetaminophen Level <17.4 (L) 17.4 - 30.0 ug/ml   COVID-19 Rapid Screening    Collection Time: 09/09/23 11:01 AM   Result Value Ref Range    SARS  COV-2 MOLECULAR Negative Negative   CBC with Differential    Collection Time: 09/09/23 11:01 AM   Result Value Ref Range    WBC 7.78 4.50 - 11.50 x10(3)/mcL    RBC 5.54 4.70 - 6.10 x10(6)/mcL    Hgb 17.2 14.0 - 18.0 g/dL    Hct 49.4 42.0 - 52.0 %    MCV 89.2 80.0 - 94.0 fL    MCH 31.0 27.0 - 31.0 pg    MCHC 34.8 33.0 - 36.0 g/dL    RDW 12.2 11.5 - 17.0 %    Platelet 213 130 - 400 x10(3)/mcL    MPV 9.7 7.4 - 10.4 fL    Neut % 81.3 %    Lymph % 12.6 %    Mono % 5.4 %    Eos % 0.0 %    Basophil % 0.3 %    Lymph # 0.98 0.6 - 4.6 x10(3)/mcL    Neut # 6.33 2.1 - 9.2 x10(3)/mcL    Mono # 0.42 0.1 - 1.3 x10(3)/mcL    Eos # 0.00 0 - 0.9 x10(3)/mcL    Baso # 0.02 <=0.2 x10(3)/mcL    IG# 0.03 0 - 0.04 x10(3)/mcL    IG% 0.4 %    NRBC% 0.0 %   Urinalysis, Reflex to Urine Culture    Collection Time: 09/09/23  1:39 PM    Specimen: Urine   Result Value Ref Range    Color, UA Yellow Yellow, Light-Yellow, Dark Yellow, Theresa, Straw    Appearance, UA Clear Clear    Specific Gravity, UA 1.015 1.005 - 1.030    pH, UA 6.5 5.0 - 8.5    Protein, UA Negative Negative    Glucose, UA Negative Negative, Normal    Ketones, UA 40 (A) Negative    Blood, UA Negative Negative    Bilirubin, UA Negative Negative    Urobilinogen, UA 0.2 0.2, 1.0, Normal    Nitrites, UA Negative Negative    Leukocyte Esterase, UA Negative Negative   Drug Screen, Urine    Collection Time: 09/09/23  1:39 PM   Result Value Ref Range    Amphetamines, Urine Negative Negative    Barbituates, Urine Negative Negative    Benzodiazepine, Urine Negative Negative    Cannabinoids, Urine Positive (A) Negative    Cocaine, Urine Negative Negative    Fentanyl, Urine Negative Negative    MDMA, Urine Negative Negative    Opiates, Urine Negative Negative    Phencyclidine, Urine Negative Negative    pH, Urine 6.5 3.0 - 11.0        No results found.       Assessment and Plan       Bipolar with angelo

## 2023-09-10 NOTE — NURSING
"PRN Administration Note:    Behavior:    Patient (Jurgen Berg is a 28 y.o. male, : 1995, MRN: [id number])     Allergies: Patient has no known allergies.    Jurgen's  height is 5' 5" (1.651 m) and weight is 54.7 kg (120 lb 9.5 oz). His temperature is 97.5 °F (36.4 °C). His blood pressure is 138/98 (abnormal) and his pulse is 80. His respiration is 18.     Reason for PRN Administration: non redirectable agitation. Psychotic. He is paranoid that people are talking about him. Banging on walls in his room. States he doesn't feel safe here. Threatening other patients.    Intervention:    Administered Ativan 2 mg PO PRN, Benadryl 50 mg PO PRN, and Haldol 10 mg PO PRN per physician order to Jurgen       Response:    Jurgen tolerated administration well.      Plan:     Continue to monitor per MD/PA/APRN orders; and reevaluate medication effectiveness within an hour.    "

## 2023-09-10 NOTE — PLAN OF CARE
Problem: Adult Inpatient Plan of Care  Goal: Plan of Care Review  Outcome: Ongoing, Progressing  Goal: Patient-Specific Goal (Individualized)  Outcome: Ongoing, Progressing  Goal: Absence of Hospital-Acquired Illness or Injury  Outcome: Ongoing, Progressing  Goal: Optimal Comfort and Wellbeing  Outcome: Ongoing, Progressing  Goal: Readiness for Transition of Care  Outcome: Ongoing, Progressing     Problem: Manic Behavior Episode  Goal: Decreased Manic Symptoms  Outcome: Ongoing, Progressing     Problem: Behavior Regulation Impairment (Psychotic Signs/Symptoms)  Goal: Improved Behavioral Control (Psychotic Signs/Symptoms)  Outcome: Ongoing, Progressing     Problem: Cognitive Impairment (Psychotic Signs/Symptoms)  Goal: Optimal Cognitive Function (Psychotic Signs/Symptoms)  Outcome: Ongoing, Progressing     Problem: Decreased Participation and Engagement (Psychotic Signs/Symptoms)  Goal: Increased Participation and Engagement (Psychotic Signs/Symptoms)  Outcome: Ongoing, Progressing     Problem: Mood Impairment (Psychotic Signs/Symptoms)  Goal: Improved Mood Symptoms (Psychotic Signs/Symptoms)  Outcome: Ongoing, Progressing     Problem: Psychomotor Impairment (Psychotic Signs/Symptoms)  Goal: Improved Psychomotor Symptoms (Psychotic Signs/Symptoms)  Outcome: Ongoing, Progressing     Problem: Sensory Perception Impairment (Psychotic Signs/Symptoms)  Goal: Decreased Sensory Symptoms (Psychotic Signs/Symptoms)  Outcome: Ongoing, Progressing     Problem: Sleep Disturbance (Psychotic Signs/Symptoms)  Goal: Improved Sleep (Psychotic Signs/Symptoms)  Outcome: Ongoing, Progressing     Problem: Social, Occupational or Functional Impairment (Psychotic Signs/Symptoms)  Goal: Enhanced Social, Occupational or Functional Skills (Psychotic Signs/Symptoms)  Outcome: Ongoing, Progressing     Problem: Activity and Energy Impairment (Excessive Substance Use)  Goal: Optimized Energy Level (Excessive Substance Use)  Outcome: Ongoing,  Progressing     Problem: Behavior Regulation Impairment (Excessive Substance Use)  Goal: Improved Behavioral Control (Excessive Substance Use)  Outcome: Ongoing, Progressing     Problem: Decreased Participation and Engagement (Excessive Substance Use)  Goal: Increased Participation and Engagement (Excessive Substance Use)  Outcome: Ongoing, Progressing     Problem: Physiologic Impairment (Excessive Substance Use)  Goal: Improved Physiologic Symptoms (Excessive Substance Use)  Outcome: Ongoing, Progressing     Problem: Social, Occupational or Functional Impairment (Excessive Substance Use)  Goal: Enhanced Social, Occupational or Functional Skills (Excessive Substance Use)  Outcome: Ongoing, Progressing     Problem: Violence Risk or Actual  Goal: Anger and Impulse Control  Outcome: Ongoing, Progressing

## 2023-09-10 NOTE — NURSING
"Admission Note:    Jurgen Berg is a 28 y.o. male, : 1995, MRN: [id number], admitted on 2023 to [facility] ([facility]) for Elfego Booth MD with a diagnosis of Bipolar disorder [F31.9]. Patient admitted on a status of Physician Emergency Certificate (PEC). Jurgen reports no known food or drug allergies.    Patient demonstrated an affect that was flat and anxious. Patient demonstrated mood during assessment that was anxious. Patient had an appearance that was poor hygiene.  Patient denies suicidal ideation. Patient denies suicide plan. Patient denies hallucinations.    Blancas  height is 5' 5" (1.651 m) and weight is 54.7 kg (120 lb 9.5 oz). His temperature is 97.9 °F (36.6 °C). His blood pressure is 132/85 and his pulse is 79. His respiration is 20.     Blancas last BM was noted on: _______    Metal detector screening performed via security personnel. The result of the scan was _______. Head-to-toe physical assessment completed with the following findings:  ________ found upon body screen. A full skin assessment was performed. Kaveh skin appeared _______.  Jurgen was oriented to unit, staff, peers, and room. Patient belongings/valuables stored in locked intake room cabinet and changes of clothing provided to patient. Jurgen was placed on Q 15 min observations.      "

## 2023-09-11 PROBLEM — F12.20 CANNABIS DEPENDENCE, CONTINUOUS: Status: ACTIVE | Noted: 2023-09-11

## 2023-09-11 LAB
ALBUMIN SERPL-MCNC: 4.5 G/DL (ref 3.5–5)
ALBUMIN/GLOB SERPL: 1.4 RATIO (ref 1.1–2)
ALP SERPL-CCNC: 62 UNIT/L (ref 40–150)
ALT SERPL-CCNC: 18 UNIT/L (ref 0–55)
AST SERPL-CCNC: 33 UNIT/L (ref 5–34)
BASOPHILS # BLD AUTO: 0.04 X10(3)/MCL
BASOPHILS NFR BLD AUTO: 0.6 %
BILIRUB SERPL-MCNC: 1.5 MG/DL
BUN SERPL-MCNC: 21.4 MG/DL (ref 8.9–20.6)
CALCIUM SERPL-MCNC: 9.6 MG/DL (ref 8.4–10.2)
CHLORIDE SERPL-SCNC: 101 MMOL/L (ref 98–107)
CHOLEST SERPL-MCNC: 165 MG/DL
CHOLEST/HDLC SERPL: 4 {RATIO} (ref 0–5)
CO2 SERPL-SCNC: 30 MMOL/L (ref 22–29)
CREAT SERPL-MCNC: 1.3 MG/DL (ref 0.73–1.18)
EOSINOPHIL # BLD AUTO: 0.1 X10(3)/MCL (ref 0–0.9)
EOSINOPHIL NFR BLD AUTO: 1.5 %
ERYTHROCYTE [DISTWIDTH] IN BLOOD BY AUTOMATED COUNT: 12.6 % (ref 11.5–17)
GFR SERPLBLD CREATININE-BSD FMLA CKD-EPI: >60 MLS/MIN/1.73/M2
GLOBULIN SER-MCNC: 3.2 GM/DL (ref 2.4–3.5)
GLUCOSE SERPL-MCNC: 75 MG/DL (ref 74–100)
HCT VFR BLD AUTO: 52.9 % (ref 42–52)
HDLC SERPL-MCNC: 43 MG/DL (ref 35–60)
HGB BLD-MCNC: 17.8 G/DL (ref 14–18)
IMM GRANULOCYTES # BLD AUTO: 0.03 X10(3)/MCL (ref 0–0.04)
IMM GRANULOCYTES NFR BLD AUTO: 0.4 %
LDLC SERPL CALC-MCNC: 102 MG/DL (ref 50–140)
LYMPHOCYTES # BLD AUTO: 2.42 X10(3)/MCL (ref 0.6–4.6)
LYMPHOCYTES NFR BLD AUTO: 35.4 %
MCH RBC QN AUTO: 31.1 PG (ref 27–31)
MCHC RBC AUTO-ENTMCNC: 33.6 G/DL (ref 33–36)
MCV RBC AUTO: 92.3 FL (ref 80–94)
MONOCYTES # BLD AUTO: 0.44 X10(3)/MCL (ref 0.1–1.3)
MONOCYTES NFR BLD AUTO: 6.4 %
NEUTROPHILS # BLD AUTO: 3.81 X10(3)/MCL (ref 2.1–9.2)
NEUTROPHILS NFR BLD AUTO: 55.7 %
NRBC BLD AUTO-RTO: 0 %
PLATELET # BLD AUTO: 223 X10(3)/MCL (ref 130–400)
PMV BLD AUTO: 10.5 FL (ref 7.4–10.4)
POTASSIUM SERPL-SCNC: 4.6 MMOL/L (ref 3.5–5.1)
PROT SERPL-MCNC: 7.7 GM/DL (ref 6.4–8.3)
RBC # BLD AUTO: 5.73 X10(6)/MCL (ref 4.7–6.1)
SODIUM SERPL-SCNC: 142 MMOL/L (ref 136–145)
T PALLIDUM AB SER QL: NONREACTIVE
TRIGL SERPL-MCNC: 101 MG/DL (ref 34–140)
VLDLC SERPL CALC-MCNC: 20 MG/DL
WBC # SPEC AUTO: 6.84 X10(3)/MCL (ref 4.5–11.5)

## 2023-09-11 PROCEDURE — 85025 COMPLETE CBC W/AUTO DIFF WBC: CPT | Performed by: NURSE PRACTITIONER

## 2023-09-11 PROCEDURE — 80061 LIPID PANEL: CPT | Performed by: NURSE PRACTITIONER

## 2023-09-11 PROCEDURE — 12400001 HC PSYCH SEMI-PRIVATE ROOM

## 2023-09-11 PROCEDURE — 25000003 PHARM REV CODE 250: Performed by: PSYCHIATRY & NEUROLOGY

## 2023-09-11 PROCEDURE — 86780 TREPONEMA PALLIDUM: CPT | Performed by: NURSE PRACTITIONER

## 2023-09-11 PROCEDURE — 80053 COMPREHEN METABOLIC PANEL: CPT | Performed by: NURSE PRACTITIONER

## 2023-09-11 RX ORDER — ARIPIPRAZOLE 5 MG/1
15 TABLET ORAL DAILY
Status: DISCONTINUED | OUTPATIENT
Start: 2023-09-11 | End: 2023-09-13

## 2023-09-11 RX ADMIN — ARIPIPRAZOLE 15 MG: 5 TABLET ORAL at 10:09

## 2023-09-11 NOTE — PROGRESS NOTES
"9/11/2023  Jurgen Berg   1995   [id number]        Psychiatry Progress Note     Chief Complaint: "I was screaming, bro."    SUBJECTIVE:   Jurgen Berg is a 28 y.o. male placed under a PEC at [facility] after family called EMS due to medication non-compliance and screaming at home.    Thoughts are circumstantial.  Requires prompting to redirect to conversation.  Very emotionally labile.  Became very tearful when talking about his brother who he "loves the most."  At his last hospitalization, he was on Abilify and hydroxyzine.  Risperdal started during this hospitalization.  Will discontinue and restart Abilify, but will do so at a higher dose.  Will monitor progress.    UDS: (+)cannabis  Blood alcohol: <10       Current Medications:   Scheduled Meds:    nicotine  1 patch Transdermal Daily    risperiDONE  1 mg Oral QHS      PRN Meds: acetaminophen, aluminum-magnesium hydroxide-simethicone, haloperidoL **AND** diphenhydrAMINE **AND** LORazepam **AND** haloperidol lactate **AND** diphenhydrAMINE **AND** lorazepam, hydrOXYzine HCL, magnesium hydroxide 400 mg/5 ml, ondansetron, promethazine, traZODone   Psychotherapeutics (From admission, onward)      Start     Stop Route Frequency Ordered    09/10/23 2100  risperiDONE tablet 1 mg         -- Oral Nightly 09/10/23 1121    09/09/23 1840  traZODone tablet 100 mg         -- Oral Nightly PRN 09/09/23 1838    09/09/23 1838  haloperidoL tablet 10 mg  (Med - Acute  Behavioral Management)        See Hyperspace for full Linked Orders Report.    -- Oral Every 6 hours PRN 09/09/23 1838    09/09/23 1838  LORazepam tablet 2 mg  (Med - Acute  Behavioral Management)        See Hyperspace for full Linked Orders Report.    -- Oral Every 6 hours PRN 09/09/23 1838    09/09/23 1838  haloperidol lactate injection 10 mg  (Med - Acute  Behavioral Management)        See Hyperspace for full Linked Orders Report.    -- IM Every 6 hours PRN 09/09/23 1838    09/09/23 1837  LORazepam " injection 2 mg  (Med - Acute  Behavioral Management)        See [facility]ray[facility] for full Linked Orders Report.    -- IM Every 6 hours PRN 09/09/23 2377            Allergies:   Review of patient's allergies indicates:  No Known Allergies     OBJECTIVE:   Vitals   Vitals:    09/10/23 1900   BP: 132/87   Pulse: 94   Resp: 20   Temp: 98.4 °F (36.9 °C)        Labs/Imaging/Studies:   Recent Results (from the past 36 hour(s))   Comprehensive Metabolic Panel    Collection Time: 09/11/23  6:23 AM   Result Value Ref Range    Sodium Level 142 136 - 145 mmol/L    Potassium Level 4.6 3.5 - 5.1 mmol/L    Chloride 101 98 - 107 mmol/L    Carbon Dioxide 30 (H) 22 - 29 mmol/L    Glucose Level 75 74 - 100 mg/dL    Blood Urea Nitrogen 21.4 (H) 8.9 - 20.6 mg/dL    Creatinine 1.30 (H) 0.73 - 1.18 mg/dL    Calcium Level Total 9.6 8.4 - 10.2 mg/dL    Protein Total 7.7 6.4 - 8.3 gm/dL    Albumin Level 4.5 3.5 - 5.0 g/dL    Globulin 3.2 2.4 - 3.5 gm/dL    Albumin/Globulin Ratio 1.4 1.1 - 2.0 ratio    Bilirubin Total 1.5 <=1.5 mg/dL    Alkaline Phosphatase 62 40 - 150 unit/L    Alanine Aminotransferase 18 0 - 55 unit/L    Aspartate Aminotransferase 33 5 - 34 unit/L    eGFR >60 mls/min/1.73/m2   Lipid Panel    Collection Time: 09/11/23  6:23 AM   Result Value Ref Range    Cholesterol Total 165 <=200 mg/dL    HDL Cholesterol 43 35 - 60 mg/dL    Triglyceride 101 34 - 140 mg/dL    Cholesterol/HDL Ratio 4 0 - 5    Very Low Density Lipoprotein 20     LDL Cholesterol 102.00 50.00 - 140.00 mg/dL   CBC with Differential    Collection Time: 09/11/23  6:23 AM   Result Value Ref Range    WBC 6.84 4.50 - 11.50 x10(3)/mcL    RBC 5.73 4.70 - 6.10 x10(6)/mcL    Hgb 17.8 14.0 - 18.0 g/dL    Hct 52.9 (H) 42.0 - 52.0 %    MCV 92.3 80.0 - 94.0 fL    MCH 31.1 (H) 27.0 - 31.0 pg    MCHC 33.6 33.0 - 36.0 g/dL    RDW 12.6 11.5 - 17.0 %    Platelet 223 130 - 400 x10(3)/mcL    MPV 10.5 (H) 7.4 - 10.4 fL    Neut % 55.7 %    Lymph % 35.4 %    Mono % 6.4 %    Eos % 1.5 %     Basophil % 0.6 %    Lymph # 2.42 0.6 - 4.6 x10(3)/mcL    Neut # 3.81 2.1 - 9.2 x10(3)/mcL    Mono # 0.44 0.1 - 1.3 x10(3)/mcL    Eos # 0.10 0 - 0.9 x10(3)/mcL    Baso # 0.04 <=0.2 x10(3)/mcL    IG# 0.03 0 - 0.04 x10(3)/mcL    IG% 0.4 %    NRBC% 0.0 %          Medical Review Of Systems:  Constitutional: negative  Respiratory: negative  Cardiovascular: negative  Gastrointestinal: negative  Genitourinary:negative  Musculoskeletal:negative  Neurological: negative      Psychiatric Mental Status Exam:  General Appearance: appears stated age, well-developed, well-nourished  Arousal: alert  Behavior: reactive, tearful  Movements and Motor Activity: no abnormal involuntary movements noted  Orientation: oriented to person, place, time, and situation  Speech: normal rate, normal rhythm, loud, normal tone  Mood: Emotionally reactive  Affect: reactive  Thought Process: circumstantial  Associations: fair  Thought Content and Perceptions: questionable delusions, no suicidal ideation, no homicidal ideation, no acute auditory hallucinations, no acute visual hallucinations  Recent and Remote Memory: recent memory intact, remote memory intact; per interview/observation with patient  Attention and Concentration: requires redirection; per interview/observation with patient  Fund of Knowledge: intact, aware of current events, vocabulary appropriate; based on history, vocabulary, fund of knowledge, syntax, grammar, and content  Insight: questionable; based on understanding of severity of illness and HPI  Judgment: questionable; based on patient's behavior and HPI      ASSESSMENT/PLAN:   Problems Addressed/Diagnoses:  Bipolar Disorder, most recent episode mixed, severe (F31.63)  Cannabis use disorder (F12.20)    Past Medical History:   Diagnosis Date    Bipolar disorder         Plan:  Bipolar disorder, established, acute exacerbation  -D/c Risperdal  -Abilify 15mg daily    Cannabis use, established, ongoing  -Group/Individual  psychotherapy      Expected Disposition Plan: Home        Elfego Booth M.D.

## 2023-09-11 NOTE — GROUP NOTE
Group Psychotherapy       Group Focus: Meds      Number of patients in attendance: 14    Group Start Time: 2030  Group End Time:  2100  Groups Date: 9/10/2023  Group Topic:  Behavioral Health  Group Department: [facility]  Group Facilitators:  Allison Darling RN  _____________________________________________________________________    Patient Name: Jurgen Berg  MRN: [id number]  Patient Class: IP- Psych   Admission Date\Time: 9/9/2023  6:35 PM  Hospital Length of Stay: 1  Primary Care Provider: Qiana Primary Doctor     Referred by: Acute Psychiatry Unit Treatment Team     Target symptoms: Bipolar D/O     Patient's response to treatment: Active Listening     Progress toward goals: Progressing adequately     Interval History:      Diagnosis: Bipolar D/O     Plan: Continue treatment on APU

## 2023-09-11 NOTE — PLAN OF CARE
Problem: Adult Inpatient Plan of Care  Goal: Plan of Care Review  Outcome: Ongoing, Progressing  Goal: Patient-Specific Goal (Individualized)  Outcome: Ongoing, Progressing  Goal: Absence of Hospital-Acquired Illness or Injury  Outcome: Ongoing, Progressing  Goal: Optimal Comfort and Wellbeing  Outcome: Ongoing, Progressing  Goal: Readiness for Transition of Care  Outcome: Ongoing, Progressing     Problem: Manic Behavior Episode  Goal: Decreased Manic Symptoms  Outcome: Ongoing, Progressing     Problem: Behavior Regulation Impairment (Psychotic Signs/Symptoms)  Goal: Improved Behavioral Control (Psychotic Signs/Symptoms)  Outcome: Ongoing, Progressing     Problem: Behavior Regulation Impairment (Psychotic Signs/Symptoms)  Goal: Improved Behavioral Control (Psychotic Signs/Symptoms)  Outcome: Ongoing, Progressing     Problem: Cognitive Impairment (Psychotic Signs/Symptoms)  Goal: Optimal Cognitive Function (Psychotic Signs/Symptoms)  Outcome: Ongoing, Progressing     Problem: Decreased Participation and Engagement (Psychotic Signs/Symptoms)  Goal: Increased Participation and Engagement (Psychotic Signs/Symptoms)  Outcome: Ongoing, Progressing     Problem: Psychomotor Impairment (Psychotic Signs/Symptoms)  Goal: Improved Psychomotor Symptoms (Psychotic Signs/Symptoms)  Outcome: Ongoing, Progressing     Problem: Sensory Perception Impairment (Psychotic Signs/Symptoms)  Goal: Decreased Sensory Symptoms (Psychotic Signs/Symptoms)  Outcome: Ongoing, Progressing     Problem: Sleep Disturbance (Psychotic Signs/Symptoms)  Goal: Improved Sleep (Psychotic Signs/Symptoms)  Outcome: Ongoing, Progressing     Problem: Social, Occupational or Functional Impairment (Psychotic Signs/Symptoms)  Goal: Enhanced Social, Occupational or Functional Skills (Psychotic Signs/Symptoms)  Outcome: Ongoing, Progressing

## 2023-09-11 NOTE — NURSING
"Daily Nursing Note:      Behavior:    Patient (Jurgen Berg is a 28 y.o. male, : 1995, MRN: [id number]) demonstrating an affect that was sad, flat, tearful, anxious, irritable, agitated, angry,  labile, and inappropriate. Jurgen demonstrating mood that is depressed, angry, anxious, swings, and fearful. Jurgen had an appearance that was disheveled. Jurgen denies suicidal ideation. Jurgen denies suicide plan. Jurgen denies homicidal ideation. Jurgen endorses hallucinations.    Jurgen's  height is 5' 5" (1.651 m) and weight is 54.7 kg (120 lb 9.5 oz). His oral temperature is 98.4 °F (36.9 °C). His blood pressure is 132/87 and his pulse is 94. His respiration is 20 and oxygen saturation is 100%.     Blancas last BM was noted on: 9/10/23.      Intervention:    Encourage Jurgen to perform self-hygiene, grooming, and changing of clothing. Monitor Jurgen's behavior and program compliance. Monitor Jurgen for suicidal ideation, homicidal ideation, sleep disturbance, and hallucinations. Encourage Jurgen to eat all portions of meals and assess for meal preferences. Monitor Jurgen for intake and output to ensure hydration. Notify the Physician/Physician Assistant/Advance Practice Registered Nurse (MD/PA/APRN) for any medication refusal and any change in patient condition.      Response:    Jurgen verbalizes understand of unit process and procedures.      Plan:     Continue to monitor per MD/PA/APRN orders; maintain patient safety.    "

## 2023-09-11 NOTE — NURSING
Daily Nursing Note:      Behavior:    Patient (Jurgne Berg is a 28 y.o. male, : 1995, MRN: [id number]) demonstrating an affect that was flat. Jurgen demonstrating mood that is pt asleep at this time. Jurgen had an appearance that was disheveled. Jurgen denies suicidal ideation. Jurgen denies suicide plan. Jurgen denies homicidal ideation. Jurgen denies hallucinations.        Intervention:    Encourage Jurgen to perform self-hygiene, grooming, and changing of clothing. Monitor Jurgen's behavior and program compliance. Monitor Jurgen for suicidal ideation, homicidal ideation, sleep disturbance, and hallucinations. Encourage Jurgen to eat all portions of meals and assess for meal preferences. Monitor Jurgen for intake and output to ensure hydration. Notify the Physician/Physician Assistant/Advance Practice Registered Nurse (MD/PA/APRN) for any medication refusal and any change in patient condition.      Response:    Jurgen verbalizes understand of unit process and procedures.       Plan:     Continue to monitor per MD/PA/APRN orders; maintain patient safety.

## 2023-09-11 NOTE — PROGRESS NOTES
"Jurgen is a 28 male admitted for Bipolar disorder, current episode mixed and Anxiety, NOS with a uds +cannabis. CTRS met with PT 1:1, Jurgen was tangential and verbally intrusive with BLADIMIR and pressured speech, reporting ability to perform his ADL's. CTRS educated Pt to TR grop times and dates, with Jurgen agreeing to attend rec groups. Jurgen reported his treatment goal as "Clear my mind".     09/11/23 0837   General   Admit Date 09/09/23   Primary Diagnosis Bipolar disorder, current episode mixed   Secondary Diagnosis Anxiety, NOS   Advent i'm very grateful   Number of Children 0   Children Living? 0   Occupation [employer]   Does the patient have dentures? No   If you were to take part in activities, which of the following would you prefer? Activities that would bring you together with other patients   Do you feel like you have enough to keep you busy now? Yes   Do you believe that you have the opportunity for physical activity? Yes   Activity Capabilities Moderate   Subjective   Patient states I found somebody   Assessment   Mobility ambulates independently   Transfers independently   Visual Acuity normal vision   Visual Perception depth perception;color perception;recognizes letters;recognizes numbers   Hearing normal   Speech/Communication normal   Cognitive Concerns oriented x4;concentration;attention span   Emotional Concerns excessive emotional response   Leisure Interest Survey   Leisure Interest Survey Yes   Social/Group Activities   Parties/Seasonal Program Current Interest   Shopping Current Interest   Restaurant Current Interest   Solitary Activities   Watching TV Current Interest   Computer Activities Current Interest   Watching Videos Current Interest   Music Listening Current Interest   Listenting to books on tape Current Interest   Reading Current Interest   Physical Activities   Dancing Current Interest   Fitness/Exercise Programs Current Interest   Creative Activities   Creative Writing Current " Interest   Singing Current Interest   Goals   Additional Documentation yes   Goal Formulation With patient   Time For Goal Achievement 7 days   Goal 1 clear my mind   Plan   Planned Therapy Intervention Group Recreational Therapy   Expected Length of Stay 5-7days   PT Frequency Minimum of 3 visits per week

## 2023-09-12 PROCEDURE — 12400001 HC PSYCH SEMI-PRIVATE ROOM

## 2023-09-12 PROCEDURE — 25000003 PHARM REV CODE 250: Performed by: NURSE PRACTITIONER

## 2023-09-12 PROCEDURE — 25000003 PHARM REV CODE 250: Performed by: PSYCHIATRY & NEUROLOGY

## 2023-09-12 RX ADMIN — TRAZODONE HYDROCHLORIDE 100 MG: 100 TABLET ORAL at 08:09

## 2023-09-12 RX ADMIN — TRAZODONE HYDROCHLORIDE 100 MG: 100 TABLET ORAL at 12:09

## 2023-09-12 RX ADMIN — ARIPIPRAZOLE 15 MG: 5 TABLET ORAL at 08:09

## 2023-09-12 RX ADMIN — HYDROXYZINE HYDROCHLORIDE 50 MG: 50 TABLET, FILM COATED ORAL at 12:09

## 2023-09-12 NOTE — PLAN OF CARE
Problem: Manic Behavior Episode  Goal: Decreased Manic Symptoms  Outcome: Ongoing, Progressing     Problem: Behavior Regulation Impairment (Psychotic Signs/Symptoms)  Goal: Improved Behavioral Control (Psychotic Signs/Symptoms)  Outcome: Ongoing, Progressing     Problem: Cognitive Impairment (Psychotic Signs/Symptoms)  Goal: Optimal Cognitive Function (Psychotic Signs/Symptoms)  Outcome: Ongoing, Progressing     Problem: Decreased Participation and Engagement (Psychotic Signs/Symptoms)  Goal: Increased Participation and Engagement (Psychotic Signs/Symptoms)  Outcome: Ongoing, Progressing     Problem: Mood Impairment (Psychotic Signs/Symptoms)  Goal: Improved Mood Symptoms (Psychotic Signs/Symptoms)  Outcome: Ongoing, Progressing     Problem: Psychomotor Impairment (Psychotic Signs/Symptoms)  Goal: Improved Psychomotor Symptoms (Psychotic Signs/Symptoms)  Outcome: Ongoing, Progressing     Problem: Sensory Perception Impairment (Psychotic Signs/Symptoms)  Goal: Decreased Sensory Symptoms (Psychotic Signs/Symptoms)  Outcome: Ongoing, Progressing     Problem: Sleep Disturbance (Psychotic Signs/Symptoms)  Goal: Improved Sleep (Psychotic Signs/Symptoms)  Outcome: Ongoing, Progressing     Problem: Social, Occupational or Functional Impairment (Psychotic Signs/Symptoms)  Goal: Enhanced Social, Occupational or Functional Skills (Psychotic Signs/Symptoms)  Outcome: Ongoing, Progressing     Problem: Activity and Energy Impairment (Excessive Substance Use)  Goal: Optimized Energy Level (Excessive Substance Use)  Outcome: Ongoing, Progressing     Problem: Behavior Regulation Impairment (Excessive Substance Use)  Goal: Improved Behavioral Control (Excessive Substance Use)  Outcome: Ongoing, Progressing     Problem: Decreased Participation and Engagement (Excessive Substance Use)  Goal: Increased Participation and Engagement (Excessive Substance Use)  Outcome: Ongoing, Progressing     Problem: Physiologic Impairment (Excessive  Substance Use)  Goal: Improved Physiologic Symptoms (Excessive Substance Use)  Outcome: Ongoing, Progressing     Problem: Social, Occupational or Functional Impairment (Excessive Substance Use)  Goal: Enhanced Social, Occupational or Functional Skills (Excessive Substance Use)  Outcome: Ongoing, Progressing     Problem: Violence Risk or Actual  Goal: Anger and Impulse Control  Outcome: Ongoing, Progressing     Problem: Adult Inpatient Plan of Care  Goal: Plan of Care Review  Outcome: Met  Goal: Patient-Specific Goal (Individualized)  Outcome: Met  Goal: Absence of Hospital-Acquired Illness or Injury  Outcome: Met  Goal: Optimal Comfort and Wellbeing  Outcome: Met

## 2023-09-12 NOTE — NURSING
"PRN Medication Follow-up Note:    Behavior:    Patient (Jurgen Berg is a 28 y.o. male, : 1995, MRN: [id number])     Allergies: Patient has no known allergies.    Blancas  height is 5' 5" (1.651 m) and weight is 54.7 kg (120 lb 9.5 oz). His temperature is 98.6 °F (37 °C). His blood pressure is 132/84 and his pulse is 75. His respiration is 16 and oxygen saturation is 97%.     Administered Atarax 50 mg.,_______ per physician order to Jurgen       Intervention:    Intervention to Jurgen's response: Administer medication as ordered. _________.       Response:    Jurgen's response: Decreasing anxiety. ________      Plan:     Continue to monitor per MD/PA/APRN orders; and reevaluate medication effectiveness within 30 minutes.    "

## 2023-09-12 NOTE — NURSING
"Daily Nursing Note:      Behavior:    Patient (Jurgen Berg is a 28 y.o. male, : 1995, MRN: [id number]) demonstrating an affect that was  labile. Jurgen demonstrating mood that is depressed and anxious. Jurgen had an appearance that was disheveled. Jurgen denies suicidal ideation. Jurgen denies suicide plan. Jurgen denies homicidal ideation. Jurgen endorses auditory and visual hallucinations.    Blancas  height is 5' 5" (1.651 m) and weight is 54.7 kg (120 lb 9.5 oz). His temperature is 98.6 °F (37 °C). His blood pressure is 132/84 and his pulse is 75. His respiration is 16 and oxygen saturation is 97%.     Blancas last BM was noted on: _2023.______      Intervention:    Encourage Jurgen to perform self-hygiene, grooming, and changing of clothing. Monitor Jurgen's behavior and program compliance. Monitor Jurgen for suicidal ideation, homicidal ideation, sleep disturbance, and hallucinations. Encourage Jurgen to eat all portions of meals and assess for meal preferences. Monitor Jurgen for intake and output to ensure hydration. Notify the Physician/Physician Assistant/Advance Practice Registered Nurse (MD/PA/APRN) for any medication refusal and any change in patient condition.      Response:    Jurgen verbalizes understand of unit process and procedures. Jurgen reported medications  Effectiveness with decreasing anxiety, depression and psychosis. ______.      Plan:     Continue to monitor per MD/PA/APRN orders; maintain patient safety.    "

## 2023-09-12 NOTE — NURSING
Daily Nursing Note:      Behavior:    Patient (Jurgen Berg is a 28 y.o. male, : 1995, MRN: [id number]) demonstrating an affect that was flat. Jurgen demonstrating mood that is pt asleep at this time. Jurgen had an appearance that was disheveled. Jurgen denies suicidal ideation. Jurgen denies suicide plan. Jurgen denies homicidal ideation. Jurgen denies hallucinations.        Intervention:    Encourage Jurgen to perform self-hygiene, grooming, and changing of clothing. Monitor Jurgen's behavior and program compliance. Monitor Jurgen for suicidal ideation, homicidal ideation, sleep disturbance, and hallucinations. Encourage Jurgen to eat all portions of meals and assess for meal preferences. Monitor Jurgen for intake and output to ensure hydration. Notify the Physician/Physician Assistant/Advance Practice Registered Nurse (MD/PA/APRN) for any medication refusal and any change in patient condition.      Response:    Jurgen verbalizes understand of unit process and procedures.       Plan:     Continue to monitor per MD/PA/APRN orders; maintain patient safety.

## 2023-09-12 NOTE — PROGRESS NOTES
Jurgen refused both TR groups despite encouragement; Alternative material was offered.   09/12/23 1400   [facility] Group Therapy   Group Name Therapeutic Recreation   Specific Interventions Skilled Activity Creative Expression   Participation Level None   Participation Quality Refused

## 2023-09-12 NOTE — NURSING
"PRN Medication Follow-up Note:    Behavior:    Patient (Jurgen Berg is a 28 y.o. male, : 1995, MRN: [id number])     Allergies: Patient has no known allergies.    Blancas  height is 5' 5" (1.651 m) and weight is 54.7 kg (120 lb 9.5 oz). His oral temperature is 97.5 °F (36.4 °C). His blood pressure is 111/75 and his pulse is 67. His respiration is 17 and oxygen saturation is 98%.     Administered Trazodone 50 mg PO PRN, per physician order to Jurgen     Intervention:    Intervention to Jurgen's response: decrease insomnia.    Response:    Jurgen's response: decrease insomnia.    Plan:     Continue to monitor per MD/PA/APRN orders; and reevaluate medication effectiveness within 30 minutes.    "
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
## 2023-09-12 NOTE — PROGRESS NOTES
"9/12/2023  Jurgen Berg   1995   [id number]        Psychiatry Progress Note     Chief Complaint: "I was screaming, bro."    SUBJECTIVE:   Jurgen Berg is a 28 y.o. male placed under a PEC at [facility] after family called EMS due to medication non-compliance and screaming at home.    Today patient states that he is doing really well. He states that he is tolerating the new medication [Abilify] well without issue. He states that he feels much more level and clear headed. Denies SI/HI and states he is no longer having AVH. Will continue with current POC and monitor progress.    UDS: (+)cannabis  Blood alcohol: <10       Current Medications:   Scheduled Meds:    ARIPiprazole  15 mg Oral Daily    nicotine  1 patch Transdermal Daily      PRN Meds: acetaminophen, aluminum-magnesium hydroxide-simethicone, haloperidoL **AND** diphenhydrAMINE **AND** LORazepam **AND** haloperidol lactate **AND** diphenhydrAMINE **AND** lorazepam, hydrOXYzine HCL, magnesium hydroxide 400 mg/5 ml, ondansetron, promethazine, traZODone   Psychotherapeutics (From admission, onward)      Start     Stop Route Frequency Ordered    09/11/23 1030  ARIPiprazole tablet 15 mg         -- Oral Daily 09/11/23 0928    09/09/23 1840  traZODone tablet 100 mg         -- Oral Nightly PRN 09/09/23 1838    09/09/23 1838  haloperidoL tablet 10 mg  (Med - Acute  Behavioral Management)        See Hyperspace for full Linked Orders Report.    -- Oral Every 6 hours PRN 09/09/23 1838    09/09/23 1838  LORazepam tablet 2 mg  (Med - Acute  Behavioral Management)        See Hyperspace for full Linked Orders Report.    -- Oral Every 6 hours PRN 09/09/23 1838    09/09/23 1838  haloperidol lactate injection 10 mg  (Med - Acute  Behavioral Management)        See Hyperspace for full Linked Orders Report.    -- IM Every 6 hours PRN 09/09/23 1838    09/09/23 1837  LORazepam injection 2 mg  (Med - Acute  Behavioral Management)        See Hyperspace for full Linked " Orders Report.    -- IM Every 6 hours PRN 09/09/23 1838            Allergies:   Review of patient's allergies indicates:  No Known Allergies     OBJECTIVE:   Vitals   Vitals:    09/12/23 0701   BP: 132/84   Pulse: 75   Resp: 16   Temp: 98.6 °F (37 °C)        Labs/Imaging/Studies:   Recent Results (from the past 36 hour(s))   Comprehensive Metabolic Panel    Collection Time: 09/11/23  6:23 AM   Result Value Ref Range    Sodium Level 142 136 - 145 mmol/L    Potassium Level 4.6 3.5 - 5.1 mmol/L    Chloride 101 98 - 107 mmol/L    Carbon Dioxide 30 (H) 22 - 29 mmol/L    Glucose Level 75 74 - 100 mg/dL    Blood Urea Nitrogen 21.4 (H) 8.9 - 20.6 mg/dL    Creatinine 1.30 (H) 0.73 - 1.18 mg/dL    Calcium Level Total 9.6 8.4 - 10.2 mg/dL    Protein Total 7.7 6.4 - 8.3 gm/dL    Albumin Level 4.5 3.5 - 5.0 g/dL    Globulin 3.2 2.4 - 3.5 gm/dL    Albumin/Globulin Ratio 1.4 1.1 - 2.0 ratio    Bilirubin Total 1.5 <=1.5 mg/dL    Alkaline Phosphatase 62 40 - 150 unit/L    Alanine Aminotransferase 18 0 - 55 unit/L    Aspartate Aminotransferase 33 5 - 34 unit/L    eGFR >60 mls/min/1.73/m2   Lipid Panel    Collection Time: 09/11/23  6:23 AM   Result Value Ref Range    Cholesterol Total 165 <=200 mg/dL    HDL Cholesterol 43 35 - 60 mg/dL    Triglyceride 101 34 - 140 mg/dL    Cholesterol/HDL Ratio 4 0 - 5    Very Low Density Lipoprotein 20     LDL Cholesterol 102.00 50.00 - 140.00 mg/dL   SYPHILIS ANTIBODY (WITH REFLEX RPR)    Collection Time: 09/11/23  6:23 AM   Result Value Ref Range    Syphilis Antibody Nonreactive Nonreactive, Equivocal   CBC with Differential    Collection Time: 09/11/23  6:23 AM   Result Value Ref Range    WBC 6.84 4.50 - 11.50 x10(3)/mcL    RBC 5.73 4.70 - 6.10 x10(6)/mcL    Hgb 17.8 14.0 - 18.0 g/dL    Hct 52.9 (H) 42.0 - 52.0 %    MCV 92.3 80.0 - 94.0 fL    MCH 31.1 (H) 27.0 - 31.0 pg    MCHC 33.6 33.0 - 36.0 g/dL    RDW 12.6 11.5 - 17.0 %    Platelet 223 130 - 400 x10(3)/mcL    MPV 10.5 (H) 7.4 - 10.4 fL     Neut % 55.7 %    Lymph % 35.4 %    Mono % 6.4 %    Eos % 1.5 %    Basophil % 0.6 %    Lymph # 2.42 0.6 - 4.6 x10(3)/mcL    Neut # 3.81 2.1 - 9.2 x10(3)/mcL    Mono # 0.44 0.1 - 1.3 x10(3)/mcL    Eos # 0.10 0 - 0.9 x10(3)/mcL    Baso # 0.04 <=0.2 x10(3)/mcL    IG# 0.03 0 - 0.04 x10(3)/mcL    IG% 0.4 %    NRBC% 0.0 %          Medical Review Of Systems:  Constitutional: negative  Respiratory: negative  Cardiovascular: negative  Gastrointestinal: negative  Genitourinary:negative  Musculoskeletal:negative  Neurological: negative      Psychiatric Mental Status Exam:  General Appearance: appears stated age, well-developed, well-nourished  Arousal: alert  Behavior: reactive, tearful  Movements and Motor Activity: no abnormal involuntary movements noted  Orientation: oriented to person, place, time, and situation  Speech: normal rate, normal rhythm, loud, normal tone  Mood: Emotionally reactive  Affect: reactive  Thought Process: circumstantial  Associations: fair  Thought Content and Perceptions: questionable delusions, no suicidal ideation, no homicidal ideation, no acute auditory hallucinations, no acute visual hallucinations  Recent and Remote Memory: recent memory intact, remote memory intact; per interview/observation with patient  Attention and Concentration: requires redirection; per interview/observation with patient  Fund of Knowledge: intact, aware of current events, vocabulary appropriate; based on history, vocabulary, fund of knowledge, syntax, grammar, and content  Insight: questionable; based on understanding of severity of illness and HPI  Judgment: questionable; based on patient's behavior and HPI      ASSESSMENT/PLAN:   Problems Addressed/Diagnoses:  Bipolar Disorder, most recent episode mixed, severe (F31.63)  Cannabis use disorder (F12.20)    Past Medical History:   Diagnosis Date    Bipolar disorder         Plan:  Bipolar disorder, established, acute exacerbation  -D/c Risperdal  -Abilify 15mg  daily    Cannabis use, established, ongoing  -Group/Individual psychotherapy      Expected Disposition Plan: Home        Marco Antonio Claudio, PMP-BC

## 2023-09-12 NOTE — NURSING
"PRN Administration Note:    Behavior:    Patient (Jurgen Berg is a 28 y.o. male, : 1995, MRN: [id number])     Allergies: Patient has no known allergies.    Jurgen's  height is 5' 5" (1.651 m) and weight is 54.7 kg (120 lb 9.5 oz). His temperature is 98.6 °F (37 °C). His blood pressure is 132/84 and his pulse is 75. His respiration is 16 and oxygen saturation is 97%.     Reason for PRN Administration: Complaints of anxiety __________.    Intervention:    Administered _ Atarax 50 mg.,______ per physician order to Jurgen       Response:    Jurgen tolerated administration well.      Plan:     Continue to monitor per MD/PA/APRN orders; and reevaluate medication effectiveness within 30 minutes.    "

## 2023-09-13 PROCEDURE — 12400001 HC PSYCH SEMI-PRIVATE ROOM

## 2023-09-13 PROCEDURE — 25000003 PHARM REV CODE 250: Performed by: NURSE PRACTITIONER

## 2023-09-13 PROCEDURE — 25000003 PHARM REV CODE 250: Performed by: PSYCHIATRY & NEUROLOGY

## 2023-09-13 RX ORDER — ARIPIPRAZOLE 5 MG/1
20 TABLET ORAL DAILY
Status: DISCONTINUED | OUTPATIENT
Start: 2023-09-14 | End: 2023-09-15 | Stop reason: HOSPADM

## 2023-09-13 RX ADMIN — HYDROXYZINE HYDROCHLORIDE 50 MG: 50 TABLET, FILM COATED ORAL at 06:09

## 2023-09-13 RX ADMIN — ARIPIPRAZOLE 15 MG: 5 TABLET ORAL at 08:09

## 2023-09-13 RX ADMIN — TRAZODONE HYDROCHLORIDE 100 MG: 100 TABLET ORAL at 08:09

## 2023-09-13 NOTE — GROUP NOTE
Group Psychotherapy       Group Focus: Life Skills      Number of patients in attendance: 10    Group focus on emotion regulation skills. Completed worksheet on ways to describe emotions, focus on anger and love. Review of prompting events, interpretations of events, biological changes, expressions of emotions, and aftereffects of emotions.     Group Start Time: 1045  Group End Time:  1130  Groups Date: 9/13/2023  Group Topic:  Behavioral Health  Group Department: [facility] - Behavioral Health Unit  Group Facilitators:  Theresa Montoya SSW   _____________________________________________________________________    Patient Name: Jurgen Berg  MRN: [id number]  Patient Class: IP- Psych   Admission Date\Time: 9/9/2023  6:35 PM  Hospital Length of Stay: 4  Primary Care Provider: Qiana, Primary Doctor     Referred by: Acute Psychiatry Unit Treatment Team     Target symptoms: Poor Coping Skills     Patient's response to treatment: Active Listening     Progress toward goals: Progressing adequately     Interval History:      Diagnosis:      Plan: Continue treatment on APU

## 2023-09-13 NOTE — NURSING
"PRN Administration Note:    Behavior:    Patient (Jurgen Berg is a 28 y.o. male, : 1995, MRN: [id number])     Allergies: Patient has no known allergies.    Jurgen's  height is 5' 5" (1.651 m) and weight is 54.7 kg (120 lb 9.5 oz). His oral temperature is 97.5 °F (36.4 °C). His blood pressure is 114/84 and his pulse is 63. His respiration is 17 and oxygen saturation is 96%.     Reason for PRN Administration: ____Insomnia______.    Intervention:    Administered __Trazadone 100mg___ per physician order to Jurgen       Response:    Jurgen tolerated administration well.      Plan:     Continue to monitor per MD/PA/APRN orders; and reevaluate medication effectiveness within 30 minutes.    "

## 2023-09-13 NOTE — NURSING
"PRN Medication Follow-up Note:    Behavior:    Patient (Jurgen Berg is a 28 y.o. male, : 1995, MRN: [id number])     Allergies: Patient has no known allergies.    Blancas  height is 5' 5" (1.651 m) and weight is 54.7 kg (120 lb 9.5 oz). His oral temperature is 97.5 °F (36.4 °C). His blood pressure is 114/84 and his pulse is 63. His respiration is 17 and oxygen saturation is 96%.     Administered _____Trazadone 100mg__ per physician order to Jurgen       Intervention:    Intervention to Jurgen's response: _medication administered as ordered ____.       Response:    Jurgen's response: _____Medication effective pt is resting well____      Plan:     Continue to monitor per MD/PA/APRN orders; and reevaluate medication effectiveness within 30 minutes.    "

## 2023-09-13 NOTE — PLAN OF CARE
Jurgen refuses TR groups, paces halls, does not interact with peers, polite with staff, and attends his ADL's.

## 2023-09-13 NOTE — PLAN OF CARE
Problem: Adult Inpatient Plan of Care  Goal: Readiness for Transition of Care  Outcome: Ongoing, Progressing     Problem: Manic Behavior Episode  Goal: Decreased Manic Symptoms  Outcome: Ongoing, Progressing     Problem: Behavior Regulation Impairment (Psychotic Signs/Symptoms)  Goal: Improved Behavioral Control (Psychotic Signs/Symptoms)  Outcome: Ongoing, Progressing     Problem: Cognitive Impairment (Psychotic Signs/Symptoms)  Goal: Optimal Cognitive Function (Psychotic Signs/Symptoms)  Outcome: Ongoing, Progressing     Problem: Decreased Participation and Engagement (Psychotic Signs/Symptoms)  Goal: Increased Participation and Engagement (Psychotic Signs/Symptoms)  Outcome: Ongoing, Progressing     Problem: Mood Impairment (Psychotic Signs/Symptoms)  Goal: Improved Mood Symptoms (Psychotic Signs/Symptoms)  Outcome: Ongoing, Progressing     Problem: Psychomotor Impairment (Psychotic Signs/Symptoms)  Goal: Improved Psychomotor Symptoms (Psychotic Signs/Symptoms)  Outcome: Ongoing, Progressing     Problem: Sensory Perception Impairment (Psychotic Signs/Symptoms)  Goal: Decreased Sensory Symptoms (Psychotic Signs/Symptoms)  Outcome: Ongoing, Progressing     Problem: Sleep Disturbance (Psychotic Signs/Symptoms)  Goal: Improved Sleep (Psychotic Signs/Symptoms)  Outcome: Ongoing, Progressing     Problem: Social, Occupational or Functional Impairment (Psychotic Signs/Symptoms)  Goal: Enhanced Social, Occupational or Functional Skills (Psychotic Signs/Symptoms)  Outcome: Ongoing, Progressing     Problem: Activity and Energy Impairment (Excessive Substance Use)  Goal: Optimized Energy Level (Excessive Substance Use)  Outcome: Ongoing, Progressing     Problem: Behavior Regulation Impairment (Excessive Substance Use)  Goal: Improved Behavioral Control (Excessive Substance Use)  Outcome: Ongoing, Progressing     Problem: Decreased Participation and Engagement (Excessive Substance Use)  Goal: Increased Participation and  Engagement (Excessive Substance Use)  Outcome: Ongoing, Progressing     Problem: Physiologic Impairment (Excessive Substance Use)  Goal: Improved Physiologic Symptoms (Excessive Substance Use)  Outcome: Ongoing, Progressing     Problem: Social, Occupational or Functional Impairment (Excessive Substance Use)  Goal: Enhanced Social, Occupational or Functional Skills (Excessive Substance Use)  Outcome: Ongoing, Progressing     Problem: Violence Risk or Actual  Goal: Anger and Impulse Control  Outcome: Ongoing, Progressing

## 2023-09-13 NOTE — PROGRESS NOTES
"9/13/2023  Jurgen Berg   1995   [id number]        Psychiatry Progress Note     Chief Complaint: "I was screaming, bro."    SUBJECTIVE:   Jurgen Berg is a 28 y.o. male placed under a PEC at [facility] after family called EMS due to medication non-compliance and screaming at home.    Staff reports that patient has been odd.  Bizarre behavior, restless, frequently paces.  Today, he states that he's "never felt better."  Feels like he will be a "better man" when he returns home.  Eating and sleeping well.  Current discharge plan will be to return home.  Not attending groups.  Of note, less emotionally reactive.  Will titrate Abilify and will monitor progress.    UDS: (+)cannabis  Blood alcohol: <10       Current Medications:   Scheduled Meds:    ARIPiprazole  15 mg Oral Daily    nicotine  1 patch Transdermal Daily      PRN Meds: acetaminophen, aluminum-magnesium hydroxide-simethicone, haloperidoL **AND** diphenhydrAMINE **AND** LORazepam **AND** haloperidol lactate **AND** diphenhydrAMINE **AND** lorazepam, hydrOXYzine HCL, magnesium hydroxide 400 mg/5 ml, ondansetron, promethazine, traZODone   Psychotherapeutics (From admission, onward)      Start     Stop Route Frequency Ordered    09/11/23 1030  ARIPiprazole tablet 15 mg         -- Oral Daily 09/11/23 0928    09/09/23 1840  traZODone tablet 100 mg         -- Oral Nightly PRN 09/09/23 1838    09/09/23 1838  haloperidoL tablet 10 mg  (Med - Acute  Behavioral Management)        See Hyperspace for full Linked Orders Report.    -- Oral Every 6 hours PRN 09/09/23 1838    09/09/23 1838  LORazepam tablet 2 mg  (Med - Acute  Behavioral Management)        See Hyperspace for full Linked Orders Report.    -- Oral Every 6 hours PRN 09/09/23 1838    09/09/23 1838  haloperidol lactate injection 10 mg  (Med - Acute  Behavioral Management)        See Hyperspace for full Linked Orders Report.    -- IM Every 6 hours PRN 09/09/23 1838    09/09/23 1837  LORazepam " injection 2 mg  (Med - Acute  Behavioral Management)        See Hyperspace for full Linked Orders Report.    -- IM Every 6 hours PRN 09/09/23 1838            Allergies:   Review of patient's allergies indicates:  No Known Allergies     OBJECTIVE:   Vitals   Vitals:    09/13/23 0701   BP: (!) 152/83   Pulse: 70   Resp: 16   Temp: 97.8 °F (36.6 °C)        Labs/Imaging/Studies:   No results found for this or any previous visit (from the past 36 hour(s)).         Medical Review Of Systems:  Constitutional: negative  Respiratory: negative  Cardiovascular: negative  Gastrointestinal: negative  Genitourinary:negative  Musculoskeletal:negative  Neurological: negative      Psychiatric Mental Status Exam:  General Appearance: appears stated age, well-developed, well-nourished  Arousal: alert  Behavior: cooperative  Movements and Motor Activity: no abnormal involuntary movements noted  Orientation: oriented to person, place, time, and situation  Speech: normal rate, normal rhythm, normal volume, normal tone  Mood: Elevated, but improved  Affect: improving  Thought Process: more linear  Associations: fair  Thought Content and Perceptions: grandiose delusions, no suicidal ideation, no homicidal ideation, no acute auditory hallucinations, no acute visual hallucinations  Recent and Remote Memory: recent memory intact, remote memory intact; per interview/observation with patient  Attention and Concentration: requires redirection; per interview/observation with patient  Fund of Knowledge: intact, aware of current events, vocabulary appropriate; based on history, vocabulary, fund of knowledge, syntax, grammar, and content  Insight: questionable; based on understanding of severity of illness and HPI  Judgment: questionable; based on patient's behavior and HPI      ASSESSMENT/PLAN:   Problems Addressed/Diagnoses:  Bipolar Disorder, most recent episode mixed, severe (F31.63)  Cannabis use disorder (F12.20)    Past Medical History:    Diagnosis Date    Bipolar disorder         Plan:  Bipolar disorder, established, acute exacerbation  -Increase Abilify to 20mg daily    Cannabis use, established, ongoing  -Group/Individual psychotherapy      Expected Disposition Plan: Home        Elfego Booth M.D.

## 2023-09-13 NOTE — CARE UPDATE
Treatment Team    Pt seem for treatment team today with interdisciplinary team.  Pt is Cooperative, Anxious, and Agitated with Tx team. Pt denies symptoms at this time. MD did not change pt meds at this time. Treatment teams goals Not met at this time. Pt DC plan is home. DC date scheduled for 9.15.2023.

## 2023-09-13 NOTE — NURSING
Patient comes to treatment team pleasant and cooperative.  He says his mood is improving.  He paces most of the day and interacts minimally with peers and staff.

## 2023-09-13 NOTE — PLAN OF CARE
Problem: Adult Inpatient Plan of Care  Goal: Readiness for Transition of Care  Outcome: Ongoing, Progressing     Problem: Manic Behavior Episode  Goal: Decreased Manic Symptoms  Outcome: Ongoing, Progressing     Problem: Behavior Regulation Impairment (Psychotic Signs/Symptoms)  Goal: Improved Behavioral Control (Psychotic Signs/Symptoms)  Outcome: Ongoing, Progressing     Problem: Cognitive Impairment (Psychotic Signs/Symptoms)  Goal: Optimal Cognitive Function (Psychotic Signs/Symptoms)  Outcome: Ongoing, Progressing     Problem: Decreased Participation and Engagement (Psychotic Signs/Symptoms)  Goal: Increased Participation and Engagement (Psychotic Signs/Symptoms)  Outcome: Ongoing, Progressing     Problem: Psychomotor Impairment (Psychotic Signs/Symptoms)  Goal: Improved Psychomotor Symptoms (Psychotic Signs/Symptoms)  Outcome: Ongoing, Progressing     Problem: Sensory Perception Impairment (Psychotic Signs/Symptoms)  Goal: Decreased Sensory Symptoms (Psychotic Signs/Symptoms)  Outcome: Ongoing, Progressing     Problem: Social, Occupational or Functional Impairment (Psychotic Signs/Symptoms)  Goal: Enhanced Social, Occupational or Functional Skills (Psychotic Signs/Symptoms)  Outcome: Ongoing, Progressing     Problem: Activity and Energy Impairment (Excessive Substance Use)  Goal: Optimized Energy Level (Excessive Substance Use)  Outcome: Ongoing, Progressing     Problem: Decreased Participation and Engagement (Excessive Substance Use)  Goal: Increased Participation and Engagement (Excessive Substance Use)  Outcome: Ongoing, Progressing     Problem: Physiologic Impairment (Excessive Substance Use)  Goal: Improved Physiologic Symptoms (Excessive Substance Use)  Outcome: Ongoing, Progressing     Problem: Violence Risk or Actual  Goal: Anger and Impulse Control  Outcome: Ongoing, Progressing

## 2023-09-13 NOTE — NURSING
"Daily Nursing Note:      Behavior:    Patient (Jurgen Berg is a 28 y.o. male, : 1995, MRN: [id number]) demonstrating an affect that was anxious and  labile. Jurgen demonstrating mood that is depressed and anxious. Jurgen had an appearance that was disheveled. Jugren denies suicidal ideation. Jurgen denies suicide plan. Jurgen denies homicidal ideation. Jurgen endorses auditory and visiual  hallucinations.    Jurgen's  height is 5' 5" (1.651 m) and weight is 54.7 kg (120 lb 9.5 oz). His oral temperature is 97.5 °F (36.4 °C). His blood pressure is 114/84 and his pulse is 63. His respiration is 17 and oxygen saturation is 96%.     Blancas last BM was noted on: ____23___      Intervention:    Encourage Jurgen to perform self-hygiene, grooming, and changing of clothing. Monitor Jurgen's behavior and program compliance. Monitor Jurgen for suicidal ideation, homicidal ideation, sleep disturbance, and hallucinations. Encourage Jurgen to eat all portions of meals and assess for meal preferences. Monitor Jurgen for intake and output to ensure hydration. Notify the Physician/Physician Assistant/Advance Practice Registered Nurse (MD/PA/APRN) for any medication refusal and any change in patient condition.      Response:    Jurgen verbalizes understand of unit process and procedures. Jurgen reported medications ___effective with decreasing anxiet, depression and anxious___.      Plan:     Continue to monitor per MD/PA/APRN orders; maintain patient safety.    "

## 2023-09-14 PROCEDURE — 12400001 HC PSYCH SEMI-PRIVATE ROOM

## 2023-09-14 PROCEDURE — 25000003 PHARM REV CODE 250: Performed by: PSYCHIATRY & NEUROLOGY

## 2023-09-14 PROCEDURE — 25000003 PHARM REV CODE 250: Performed by: NURSE PRACTITIONER

## 2023-09-14 RX ORDER — ARIPIPRAZOLE 20 MG/1
20 TABLET ORAL DAILY
Qty: 30 TABLET | Refills: 0 | Status: SHIPPED | OUTPATIENT
Start: 2023-09-15 | End: 2023-10-15

## 2023-09-14 RX ORDER — IBUPROFEN 200 MG
1 TABLET ORAL DAILY
Qty: 28 PATCH | Refills: 0 | Status: SHIPPED | OUTPATIENT
Start: 2023-09-15 | End: 2023-10-13

## 2023-09-14 RX ADMIN — ARIPIPRAZOLE 20 MG: 5 TABLET ORAL at 08:09

## 2023-09-14 RX ADMIN — HYDROXYZINE HYDROCHLORIDE 50 MG: 50 TABLET, FILM COATED ORAL at 02:09

## 2023-09-14 RX ADMIN — TRAZODONE HYDROCHLORIDE 100 MG: 100 TABLET ORAL at 08:09

## 2023-09-14 NOTE — NURSING
Daily Nursing Note:        Behavior:     Patient (Jurgen Berg is a 28 y.o. male, : 1995, MRN: [id number]) demonstrating an affect that was anxious and  labile. Jurgen demonstrating mood that is depressed and anxious. Jurgen had an appearance that was disheveled. Jurgen denies suicidal ideation. Jurgen denies suicide plan. Jurgen denies homicidal ideation. Jurgen endorses auditory and visiual  hallucinations.          Blancas last BM was noted on: ____23___        Intervention:     Encourage Jurgen to perform self-hygiene, grooming, and changing of clothing. Monitor Jurgen's behavior and program compliance. Monitor Jurgen for suicidal ideation, homicidal ideation, sleep disturbance, and hallucinations. Encourage Jurgen to eat all portions of meals and assess for meal preferences. Monitor Jurgen for intake and output to ensure hydration. Notify the Physician/Physician Assistant/Advance Practice Registered Nurse (MD/PA/APRN) for any medication refusal and any change in patient condition.        Response:     Jurgen verbalizes understand of unit process and procedures. Jurgen reported medications ___effective with decreasing anxiet, depression and anxious___.        Plan:      Continue to monitor per MD/PA/APRN orders; maintain patient safety.

## 2023-09-14 NOTE — PLAN OF CARE
Problem: Manic Behavior Episode  Goal: Decreased Manic Symptoms  Outcome: Ongoing, Progressing     Problem: Behavior Regulation Impairment (Psychotic Signs/Symptoms)  Goal: Improved Behavioral Control (Psychotic Signs/Symptoms)  Outcome: Ongoing, Progressing     Problem: Cognitive Impairment (Psychotic Signs/Symptoms)  Goal: Optimal Cognitive Function (Psychotic Signs/Symptoms)  Outcome: Ongoing, Progressing     Problem: Decreased Participation and Engagement (Psychotic Signs/Symptoms)  Goal: Increased Participation and Engagement (Psychotic Signs/Symptoms)  Outcome: Ongoing, Progressing     Problem: Mood Impairment (Psychotic Signs/Symptoms)  Goal: Improved Mood Symptoms (Psychotic Signs/Symptoms)  Outcome: Ongoing, Progressing     Problem: Psychomotor Impairment (Psychotic Signs/Symptoms)  Goal: Improved Psychomotor Symptoms (Psychotic Signs/Symptoms)  Outcome: Ongoing, Progressing     Problem: Sensory Perception Impairment (Psychotic Signs/Symptoms)  Goal: Decreased Sensory Symptoms (Psychotic Signs/Symptoms)  Outcome: Ongoing, Progressing     Problem: Sleep Disturbance (Psychotic Signs/Symptoms)  Goal: Improved Sleep (Psychotic Signs/Symptoms)  Outcome: Ongoing, Progressing     Problem: Social, Occupational or Functional Impairment (Psychotic Signs/Symptoms)  Goal: Enhanced Social, Occupational or Functional Skills (Psychotic Signs/Symptoms)  Outcome: Ongoing, Progressing     Problem: Activity and Energy Impairment (Excessive Substance Use)  Goal: Optimized Energy Level (Excessive Substance Use)  Outcome: Ongoing, Progressing     Problem: Behavior Regulation Impairment (Excessive Substance Use)  Goal: Improved Behavioral Control (Excessive Substance Use)  Outcome: Ongoing, Progressing     Problem: Decreased Participation and Engagement (Excessive Substance Use)  Goal: Increased Participation and Engagement (Excessive Substance Use)  Outcome: Ongoing, Progressing     Problem: Physiologic Impairment (Excessive  Substance Use)  Goal: Improved Physiologic Symptoms (Excessive Substance Use)  Outcome: Ongoing, Progressing     Problem: Social, Occupational or Functional Impairment (Excessive Substance Use)  Goal: Enhanced Social, Occupational or Functional Skills (Excessive Substance Use)  Outcome: Ongoing, Progressing     Problem: Violence Risk or Actual  Goal: Anger and Impulse Control  Outcome: Ongoing, Progressing     Problem: Adult Inpatient Plan of Care  Goal: Readiness for Transition of Care  Outcome: Met

## 2023-09-14 NOTE — PROGRESS NOTES
"9/14/2023  Jurgen Berg   1995   [id number]        Psychiatry Progress Note     Chief Complaint: "I was screaming, bro."    SUBJECTIVE:   Jurgen Berg is a 28 y.o. male placed under a PEC at [facility] after family called EMS due to medication non-compliance and screaming at home.    Staff reports that patient has been calm and cooperative. He continues to pace but appears to be slightly less bizarre.  Today, he states that he's "continue to feel better every day."  Eating and sleeping well.  Current discharge plan will be to return home.  Not attending groups. Will continue with current POC and plan for discharge tomorrow     UDS: (+)cannabis  Blood alcohol: <10       Current Medications:   Scheduled Meds:    ARIPiprazole  20 mg Oral Daily    nicotine  1 patch Transdermal Daily      PRN Meds: acetaminophen, aluminum-magnesium hydroxide-simethicone, haloperidoL **AND** diphenhydrAMINE **AND** LORazepam **AND** haloperidol lactate **AND** diphenhydrAMINE **AND** lorazepam, hydrOXYzine HCL, magnesium hydroxide 400 mg/5 ml, ondansetron, promethazine, traZODone   Psychotherapeutics (From admission, onward)      Start     Stop Route Frequency Ordered    09/14/23 0900  ARIPiprazole tablet 20 mg         -- Oral Daily 09/13/23 0945    09/09/23 1840  traZODone tablet 100 mg         -- Oral Nightly PRN 09/09/23 1838    09/09/23 1838  haloperidoL tablet 10 mg  (Med - Acute  Behavioral Management)        See Hyperspace for full Linked Orders Report.    -- Oral Every 6 hours PRN 09/09/23 1838    09/09/23 1838  LORazepam tablet 2 mg  (Med - Acute  Behavioral Management)        See Hyperspace for full Linked Orders Report.    -- Oral Every 6 hours PRN 09/09/23 1838    09/09/23 1838  haloperidol lactate injection 10 mg  (Med - Acute  Behavioral Management)        See Hyperspace for full Linked Orders Report.    -- IM Every 6 hours PRN 09/09/23 1838    09/09/23 1837  LORazepam injection 2 mg  (Med - Acute  Behavioral " Management)        See [place] for full Linked Orders Report.    -- IM Every 6 hours PRN 09/09/23 1838            Allergies:   Review of patient's allergies indicates:  No Known Allergies     OBJECTIVE:   Vitals   Vitals:    09/14/23 0937   BP: 127/86   Pulse: (!) 56   Resp: 18   Temp: 98 °F (36.7 °C)        Labs/Imaging/Studies:   No results found for this or any previous visit (from the past 36 hour(s)).         Medical Review Of Systems:  Constitutional: negative  Respiratory: negative  Cardiovascular: negative  Gastrointestinal: negative  Genitourinary:negative  Musculoskeletal:negative  Neurological: negative      Psychiatric Mental Status Exam:  General Appearance: appears stated age, well-developed, well-nourished  Arousal: alert  Behavior: cooperative  Movements and Motor Activity: no abnormal involuntary movements noted  Orientation: oriented to person, place, time, and situation  Speech: normal rate, normal rhythm, normal volume, normal tone  Mood: Elevated, but improved  Affect: improving  Thought Process: more linear  Associations: fair  Thought Content and Perceptions: grandiose delusions, no suicidal ideation, no homicidal ideation, no acute auditory hallucinations, no acute visual hallucinations  Recent and Remote Memory: recent memory intact, remote memory intact; per interview/observation with patient  Attention and Concentration: requires redirection; per interview/observation with patient  Fund of Knowledge: intact, aware of current events, vocabulary appropriate; based on history, vocabulary, fund of knowledge, syntax, grammar, and content  Insight: questionable; based on understanding of severity of illness and HPI  Judgment: questionable; based on patient's behavior and HPI      ASSESSMENT/PLAN:   Problems Addressed/Diagnoses:  Bipolar Disorder, most recent episode mixed, severe (F31.63)  Cannabis use disorder (F12.20)    Past Medical History:   Diagnosis Date    Bipolar disorder          Plan:  Bipolar disorder, established, acute exacerbation  -Continue Abilify to 20mg daily    Cannabis use, established, ongoing  -Group/Individual psychotherapy      Expected Disposition Plan: [facility], [id number]

## 2023-09-14 NOTE — GROUP NOTE
Group Psychotherapy       Group Focus: Meds      Number of patients in attendance: 15    Group Start Time: 2030  Group End Time:  2100  Groups Date: 9/13/2023  Group Topic:  Behavioral Health  Group Department: [facility]  Group Facilitators:  Allison Darling RN  _____________________________________________________________________    Patient Name: Jurgen Berg  MRN: [id number]  Patient Class: IP- Psych   Admission Date\Time: 9/9/2023  6:35 PM  Hospital Length of Stay: 5  Primary Care Provider: [facility] Primary Doctor     Referred by: Acute Psychiatry Unit Treatment Team     Target symptoms: Bipolar D/O     Patient's response to treatment: Active Listening     Progress toward goals: Progressing adequately     Interval History:      Diagnosis: Bipolar D/O     Plan: Continue treatment on AP[facility]

## 2023-09-14 NOTE — NURSING
"Daily Nursing Note:      Behavior:    Patient (Jurgen Berg is a 28 y.o. male, : 1995, MRN: [id number]) demonstrating an affect that was sad, flat, and anxious. Jurgen demonstrating mood that is depressed and anxious. Jurgen had an appearance that was clean. Jurgen denies suicidal ideation. Jurgen denies suicide plan. Jurgen denies homicidal ideation. Jurgen denies hallucinations.    Blancas  height is 5' 5" (1.651 m) and weight is 54.7 kg (120 lb 9.5 oz). His temperature is 97.8 °F (36.6 °C). His blood pressure is 152/83 (abnormal) and his pulse is 70. His respiration is 16 and oxygen saturation is 99%.     Blancas last BM was noted on: 23.      Intervention:    Encourage Jurgen to perform self-hygiene, grooming, and changing of clothing. Monitor Blancas behavior and program compliance. Monitor Jurgen for suicidal ideation, homicidal ideation, sleep disturbance, and hallucinations. Encourage Jurgen to eat all portions of meals and assess for meal preferences. Monitor Jurgen for intake and output to ensure hydration. Notify the Physician/Physician Assistant/Advance Practice Registered Nurse (MD/PA/APRN) for any medication refusal and any change in patient condition.      Response:    Jurgen verbalizes understand of unit process and procedures.       Plan:     Continue to monitor per MD/PA/APRN orders; maintain patient safety.    "

## 2023-09-15 VITALS
HEART RATE: 68 BPM | WEIGHT: 120.56 LBS | OXYGEN SATURATION: 97 % | DIASTOLIC BLOOD PRESSURE: 85 MMHG | SYSTOLIC BLOOD PRESSURE: 125 MMHG | HEIGHT: 65 IN | BODY MASS INDEX: 20.09 KG/M2 | TEMPERATURE: 98 F | RESPIRATION RATE: 18 BRPM

## 2023-09-15 PROCEDURE — 25000003 PHARM REV CODE 250: Performed by: NURSE PRACTITIONER

## 2023-09-15 PROCEDURE — 25000003 PHARM REV CODE 250: Performed by: PSYCHIATRY & NEUROLOGY

## 2023-09-15 RX ADMIN — HYDROXYZINE HYDROCHLORIDE 50 MG: 50 TABLET, FILM COATED ORAL at 04:09

## 2023-09-15 RX ADMIN — ARIPIPRAZOLE 20 MG: 5 TABLET ORAL at 08:09

## 2023-09-15 NOTE — NURSING
Daily Nursing Note:        Behavior:     Patient (Jurgen Berg is a 28 y.o. male, : 1995, MRN: [id number]) demonstrating an affect that was anxious and  labile. Jurgen demonstrating mood that is depressed and anxious. Jurgen had an appearance that was disheveled. Jurgen denies suicidal ideation. Jurgen denies suicide plan. Jurgen denies homicidal ideation. Jurgen endorses auditory and visiual  hallucinations.          Blancas last BM was noted on: ____23___        Intervention:     Encourage Jurgen to perform self-hygiene, grooming, and changing of clothing. Monitor Jurgen's behavior and program compliance. Monitor Jurgen for suicidal ideation, homicidal ideation, sleep disturbance, and hallucinations. Encourage Jurgen to eat all portions of meals and assess for meal preferences. Monitor Jurgen for intake and output to ensure hydration. Notify the Physician/Physician Assistant/Advance Practice Registered Nurse (MD/PA/APRN) for any medication refusal and any change in patient condition.        Response:     Jurgen verbalizes understand of unit process and procedures. Jurgen reported medications ___effective with decreasing anxiet, depression and anxious___.        Plan:      Continue to monitor per MD/PA/APRN orders; maintain patient safety.      Spironolactone Counseling: Patient advised regarding risks of diarrhea, abdominal pain, hyperkalemia, birth defects (for female patients), liver toxicity and renal toxicity. The patient may need blood work to monitor liver and kidney function and potassium levels while on therapy. The patient verbalized understanding of the proper use and possible adverse effects of spironolactone.  All of the patient's questions and concerns were addressed. Erythromycin Pregnancy And Lactation Text: This medication is Pregnancy Category B and is considered safe during pregnancy. It is also excreted in breast milk. Sarecycline Pregnancy And Lactation Text: This medication is Pregnancy Category D and not consider safe during pregnancy. It is also excreted in breast milk. Minocycline Counseling: Patient advised regarding possible photosensitivity and discoloration of the teeth, skin, lips, tongue and gums.  Patient instructed to avoid sunlight, if possible.  When exposed to sunlight, patients should wear protective clothing, sunglasses, and sunscreen.  The patient was instructed to call the office immediately if the following severe adverse effects occur:  hearing changes, easy bruising/bleeding, severe headache, or vision changes.  The patient verbalized understanding of the proper use and possible adverse effects of minocycline.  All of the patient's questions and concerns were addressed. Topical Sulfur Applications Counseling: Topical Sulfur Counseling: Patient counseled that this medication may cause skin irritation or allergic reactions.  In the event of skin irritation, the patient was advised to reduce the amount of the drug applied or use it less frequently.   The patient verbalized understanding of the proper use and possible adverse effects of topical sulfur application.  All of the patient's questions and concerns were addressed. Tazorac Pregnancy And Lactation Text: This medication is not safe during pregnancy. It is unknown if this medication is excreted in breast milk. Topical Retinoid Pregnancy And Lactation Text: This medication is Pregnancy Category C. It is unknown if this medication is excreted in breast milk. Tazorac Counseling:  Patient advised that medication is irritating and drying.  Patient may need to apply sparingly and wash off after an hour before eventually leaving it on overnight.  The patient verbalized understanding of the proper use and possible adverse effects of tazorac.  All of the patient's questions and concerns were addressed. Benzoyl Peroxide Pregnancy And Lactation Text: This medication is Pregnancy Category C. It is unknown if benzoyl peroxide is excreted in breast milk. High Dose Vitamin A Counseling: Side effects reviewed, pt to contact office should one occur. Birth Control Pills Pregnancy And Lactation Text: This medication should be avoided if pregnant and for the first 30 days post-partum. Isotretinoin Counseling: Patient should get monthly blood tests, not donate blood, not drive at night if vision affected, not share medication, and not undergo elective surgery for 6 months after tx completed. Side effects reviewed, pt to contact office should one occur. Doxycycline Counseling:  Patient counseled regarding possible photosensitivity and increased risk for sunburn.  Patient instructed to avoid sunlight, if possible.  When exposed to sunlight, patients should wear protective clothing, sunglasses, and sunscreen.  The patient was instructed to call the office immediately if the following severe adverse effects occur:  hearing changes, easy bruising/bleeding, severe headache, or vision changes.  The patient verbalized understanding of the proper use and possible adverse effects of doxycycline.  All of the patient's questions and concerns were addressed. Dapsone Pregnancy And Lactation Text: This medication is Pregnancy Category C and is not considered safe during pregnancy or breast feeding. Tetracycline Counseling: Patient counseled regarding possible photosensitivity and increased risk for sunburn.  Patient instructed to avoid sunlight, if possible.  When exposed to sunlight, patients should wear protective clothing, sunglasses, and sunscreen.  The patient was instructed to call the office immediately if the following severe adverse effects occur:  hearing changes, easy bruising/bleeding, severe headache, or vision changes.  The patient verbalized understanding of the proper use and possible adverse effects of tetracycline.  All of the patient's questions and concerns were addressed. Patient understands to avoid pregnancy while on therapy due to potential birth defects. Isotretinoin Pregnancy And Lactation Text: This medication is Pregnancy Category X and is considered extremely dangerous during pregnancy. It is unknown if it is excreted in breast milk. Azithromycin Pregnancy And Lactation Text: This medication is considered safe during pregnancy and is also secreted in breast milk. Topical Retinoid counseling:  Patient advised to apply a pea-sized amount only at bedtime and wait 30 minutes after washing their face before applying.  If too drying, patient may add a non-comedogenic moisturizer. The patient verbalized understanding of the proper use and possible adverse effects of retinoids.  All of the patient's questions and concerns were addressed. Spironolactone Pregnancy And Lactation Text: This medication can cause feminization of the male fetus and should be avoided during pregnancy. The active metabolite is also found in breast milk. Use Enhanced Medication Counseling?: No Sarecycline Counseling: Patient advised regarding possible photosensitivity and discoloration of the teeth, skin, lips, tongue and gums.  Patient instructed to avoid sunlight, if possible.  When exposed to sunlight, patients should wear protective clothing, sunglasses, and sunscreen.  The patient was instructed to call the office immediately if the following severe adverse effects occur:  hearing changes, easy bruising/bleeding, severe headache, or vision changes.  The patient verbalized understanding of the proper use and possible adverse effects of sarecycline.  All of the patient's questions and concerns were addressed. Topical Clindamycin Counseling: Patient counseled that this medication may cause skin irritation or allergic reactions.  In the event of skin irritation, the patient was advised to reduce the amount of the drug applied or use it less frequently.   The patient verbalized understanding of the proper use and possible adverse effects of clindamycin.  All of the patient's questions and concerns were addressed. Dapsone Counseling: I discussed with the patient the risks of dapsone including but not limited to hemolytic anemia, agranulocytosis, rashes, methemoglobinemia, kidney failure, peripheral neuropathy, headaches, GI upset, and liver toxicity.  Patients who start dapsone require monitoring including baseline LFTs and weekly CBCs for the first month, then every month thereafter.  The patient verbalized understanding of the proper use and possible adverse effects of dapsone.  All of the patient's questions and concerns were addressed. Detail Level: Zone Bactrim Pregnancy And Lactation Text: This medication is Pregnancy Category D and is known to cause fetal risk.  It is also excreted in breast milk. Topical Clindamycin Pregnancy And Lactation Text: This medication is Pregnancy Category B and is considered safe during pregnancy. It is unknown if it is excreted in breast milk. Topical Sulfur Applications Pregnancy And Lactation Text: This medication is Pregnancy Category C and has an unknown safety profile during pregnancy. It is unknown if this topical medication is excreted in breast milk. Erythromycin Counseling:  I discussed with the patient the risks of erythromycin including but not limited to GI upset, allergic reaction, drug rash, diarrhea, increase in liver enzymes, and yeast infections. Doxycycline Pregnancy And Lactation Text: This medication is Pregnancy Category D and not consider safe during pregnancy. It is also excreted in breast milk but is considered safe for shorter treatment courses. Bactrim Counseling:  I discussed with the patient the risks of sulfa antibiotics including but not limited to GI upset, allergic reaction, drug rash, diarrhea, dizziness, photosensitivity, and yeast infections.  Rarely, more serious reactions can occur including but not limited to aplastic anemia, agranulocytosis, methemoglobinemia, blood dyscrasias, liver or kidney failure, lung infiltrates or desquamative/blistering drug rashes. Azithromycin Counseling:  I discussed with the patient the risks of azithromycin including but not limited to GI upset, allergic reaction, drug rash, diarrhea, and yeast infections. High Dose Vitamin A Pregnancy And Lactation Text: High dose vitamin A therapy is contraindicated during pregnancy and breast feeding. Benzoyl Peroxide Counseling: Patient counseled that medicine may cause skin irritation and bleach clothing.  In the event of skin irritation, the patient was advised to reduce the amount of the drug applied or use it less frequently.   The patient verbalized understanding of the proper use and possible adverse effects of benzoyl peroxide.  All of the patient's questions and concerns were addressed. Birth Control Pills Counseling: Birth Control Pill Counseling: I discussed with the patient the potential side effects of OCPs including but not limited to increased risk of stroke, heart attack, thrombophlebitis, deep venous thrombosis, hepatic adenomas, breast changes, GI upset, headaches, and depression.  The patient verbalized understanding of the proper use and possible adverse effects of OCPs. All of the patient's questions and concerns were addressed.

## 2023-09-15 NOTE — PLAN OF CARE
Jurgen met his TR treatment goal of clear my mind.   CTRS Discharge Recommendations:  Encouraged Pt. to actively utilize available community resources to increase leisure involvement to decrease signs and symptoms of illness.  Encouraged Pt. to utilize coping skills on a regular basis to reduce the risk of decomposition and re-hospitalization.

## 2023-09-15 NOTE — PROGRESS NOTES
"9/15/2023  Jurgen Berg   1995   [id number]        Psychiatry Progress Note     Chief Complaint: "I was screaming, bro."    SUBJECTIVE:   Jurgen Berg is a 28 y.o. male placed under a PEC at [facility] after family called EMS due to medication non-compliance and screaming at home.    Today patient states that he is "100". He feels the medication has been very affective. He did state that he is having some sluggishness during the day and I educated him to move the Abilify to night. Staff reports that patient has been calm and cooperative. Eating and sleeping well.  Current discharge plan will be to return home.  Will continue with current POC and proceed with discharge today.     UDS: (+)cannabis  Blood alcohol: <10       Current Medications:   Scheduled Meds:    ARIPiprazole  20 mg Oral Daily    nicotine  1 patch Transdermal Daily      PRN Meds: acetaminophen, aluminum-magnesium hydroxide-simethicone, haloperidoL **AND** diphenhydrAMINE **AND** LORazepam **AND** haloperidol lactate **AND** diphenhydrAMINE **AND** lorazepam, hydrOXYzine HCL, magnesium hydroxide 400 mg/5 ml, ondansetron, promethazine, traZODone   Psychotherapeutics (From admission, onward)      Start     Stop Route Frequency Ordered    09/14/23 0900  ARIPiprazole tablet 20 mg         -- Oral Daily 09/13/23 0945    09/09/23 1840  traZODone tablet 100 mg         -- Oral Nightly PRN 09/09/23 1838    09/09/23 1838  haloperidoL tablet 10 mg  (Med - Acute  Behavioral Management)        See Hyperspace for full Linked Orders Report.    -- Oral Every 6 hours PRN 09/09/23 1838    09/09/23 1838  LORazepam tablet 2 mg  (Med - Acute  Behavioral Management)        See Hyperspace for full Linked Orders Report.    -- Oral Every 6 hours PRN 09/09/23 1838    09/09/23 1838  haloperidol lactate injection 10 mg  (Med - Acute  Behavioral Management)        See Hyperspace for full Linked Orders Report.    -- IM Every 6 hours PRN 09/09/23 1838    09/09/23 1837 "  LORazepam injection 2 mg  (Med - Acute  Behavioral Management)        See Hyperspace for full Linked Orders Report.    -- IM Every 6 hours PRN 09/09/23 1023            Allergies:   Review of patient's allergies indicates:  No Known Allergies     OBJECTIVE:   Vitals   Vitals:    09/14/23 0937   BP: 127/86   Pulse: (!) 56   Resp: 18   Temp: 98 °F (36.7 °C)        Labs/Imaging/Studies:   No results found for this or any previous visit (from the past 36 hour(s)).         Medical Review Of Systems:  Constitutional: negative  Respiratory: negative  Cardiovascular: negative  Gastrointestinal: negative  Genitourinary:negative  Musculoskeletal:negative  Neurological: negative      Psychiatric Mental Status Exam:  General Appearance: appears stated age, well-developed, well-nourished  Arousal: alert  Behavior: cooperative  Movements and Motor Activity: no abnormal involuntary movements noted  Orientation: oriented to person, place, time, and situation  Speech: normal rate, normal rhythm, normal volume, normal tone  Mood: Elevated, but improved  Affect: improving  Thought Process: more linear  Associations: fair  Thought Content and Perceptions: grandiose delusions, no suicidal ideation, no homicidal ideation, no acute auditory hallucinations, no acute visual hallucinations  Recent and Remote Memory: recent memory intact, remote memory intact; per interview/observation with patient  Attention and Concentration: requires redirection; per interview/observation with patient  Fund of Knowledge: intact, aware of current events, vocabulary appropriate; based on history, vocabulary, fund of knowledge, syntax, grammar, and content  Insight: questionable; based on understanding of severity of illness and HPI  Judgment: questionable; based on patient's behavior and HPI      ASSESSMENT/PLAN:   Problems Addressed/Diagnoses:  Bipolar Disorder, most recent episode mixed, severe (F31.63)  Cannabis use disorder (F12.20)    Past Medical  History:   Diagnosis Date    Bipolar disorder         Plan:  Bipolar disorder, established, acute exacerbation  -Continue Abilify to 20mg daily    Cannabis use, established, ongoing  -Group/Individual psychotherapy      Expected Disposition Plan: Home        CRISTINA KapoorP-BC

## 2023-09-15 NOTE — NURSING
Discharge Note:    Jurgen Berg is a 28 y.o. male, : 1995, MRN: [id number], admitted on 2023 for Elfego Booth MD with a diagnosis of Bipolar disorder [F31.9].    Patient discharged on 9/15/2023 per physician orders in stable condition. Patient denied suicidal ideation, homicidal ideation, or hallucinations. Patient was discharged with valuables, personal belongings, prescriptions, discharge instructions, and an educational handout explaining the diagnosis and prescribed medications. Patient verbalized understanding of the discharge instructions and importance of follow-up visits. Patient was escorted out of the facility by [facility] and placed into a private vehicle to be transported to home.     Patient discharged on the following medications:     Medication List        START taking these medications      nicotine 21 mg/24 hr  Commonly known as: NICODERM CQ  Place 1 patch onto the skin once daily.            CHANGE how you take these medications      ARIPiprazole 20 MG Tab  Commonly known as: ABILIFY  Take 1 tablet (20 mg total) by mouth once daily.  What changed:   medication strength  how much to take            STOP taking these medications      divalproex 500 MG Tb24     hydrOXYzine 50 MG tablet  Commonly known as: ATARAX     traZODone 50 MG tablet  Commonly known as: DESYREL               Where to Get Your Medications        You can get these medications from any pharmacy    Bring a paper prescription for each of these medications  ARIPiprazole 20 MG Tab  nicotine 21 mg/24 hr

## 2023-09-15 NOTE — PLAN OF CARE
Problem: Manic Behavior Episode  Goal: Decreased Manic Symptoms  Outcome: Met     Problem: Behavior Regulation Impairment (Psychotic Signs/Symptoms)  Goal: Improved Behavioral Control (Psychotic Signs/Symptoms)  Outcome: Met     Problem: Cognitive Impairment (Psychotic Signs/Symptoms)  Goal: Optimal Cognitive Function (Psychotic Signs/Symptoms)  Outcome: Met     Problem: Decreased Participation and Engagement (Psychotic Signs/Symptoms)  Goal: Increased Participation and Engagement (Psychotic Signs/Symptoms)  Outcome: Met     Problem: Mood Impairment (Psychotic Signs/Symptoms)  Goal: Improved Mood Symptoms (Psychotic Signs/Symptoms)  Outcome: Met     Problem: Psychomotor Impairment (Psychotic Signs/Symptoms)  Goal: Improved Psychomotor Symptoms (Psychotic Signs/Symptoms)  Outcome: Met     Problem: Sensory Perception Impairment (Psychotic Signs/Symptoms)  Goal: Decreased Sensory Symptoms (Psychotic Signs/Symptoms)  Outcome: Met     Problem: Sleep Disturbance (Psychotic Signs/Symptoms)  Goal: Improved Sleep (Psychotic Signs/Symptoms)  Outcome: Met     Problem: Social, Occupational or Functional Impairment (Psychotic Signs/Symptoms)  Goal: Enhanced Social, Occupational or Functional Skills (Psychotic Signs/Symptoms)  Outcome: Met     Problem: Activity and Energy Impairment (Excessive Substance Use)  Goal: Optimized Energy Level (Excessive Substance Use)  Outcome: Met     Problem: Behavior Regulation Impairment (Excessive Substance Use)  Goal: Improved Behavioral Control (Excessive Substance Use)  Outcome: Met     Problem: Decreased Participation and Engagement (Excessive Substance Use)  Goal: Increased Participation and Engagement (Excessive Substance Use)  Outcome: Met     Problem: Physiologic Impairment (Excessive Substance Use)  Goal: Improved Physiologic Symptoms (Excessive Substance Use)  Outcome: Met     Problem: Social, Occupational or Functional Impairment (Excessive Substance Use)  Goal: Enhanced Social,  Occupational or Functional Skills (Excessive Substance Use)  Outcome: Met     Problem: Violence Risk or Actual  Goal: Anger and Impulse Control  Outcome: Met

## 2023-09-15 NOTE — NURSING
Pt is scheduled for discharge today, currently voicing no ADRs or physical complaints at this time, vital signs are stable, pt is not in any physical distress at the current time, currently voicing no suicidal or homicidal ideations at this  Time, voicing no hallucinations or delusions at this time, voicing no detox symptoms at this time, compliant with medication ordered, pt was given dc instructions, voiced understanding, pt will receive a dc pkt, it will contain an RX for dc med, aftercare appts, lab work and educational material.  Belongings were packed by t, pt  Is going home and his family will pick him up,  Pt will be brought up to dc area when his transportation arrives.

## 2023-09-15 NOTE — GROUP NOTE
Group Psychotherapy       Group Focus: Medication      Number of patients in attendance: 15    Group Start Time: 2030  Group End Time:  2100  Groups Date: 9/14/2023  Group Topic:  Behavioral Health  Group Department: [facility]  Group Facilitators:  Blanca Mondragon RN  _____________________________________________________________________    Patient Name: Jurgen Berg  MRN: [id number]  Patient Class: IP- Psych   Admission Date\Time: 9/9/2023  6:35 PM  Hospital Length of Stay: 5  Primary Care Provider: Qiana Primary Doctor     Referred by: Acute Psychiatry Unit Treatment Team     Target symptoms: Substance Abuse and Depression     Patient's response to treatment: Active Listening     Progress toward goals: Progressing adequately     Interval History:      Diagnosis: Bipolar Disorder     Plan: Continue treatment on [facility]

## 2023-10-02 NOTE — DISCHARGE SUMMARY
"DISCHARGE SUMMARY  PSYCHIATRY      Admit Date: 9/9/2023  6:35 PM    Discharge Date:  9/15/2023    SITE:   [facility]    Discharge Attending Physician: Elfego Booth M.D.    Chief Complaint:  "I'm happy and just want to stay that way."     History of Present Illness On Admit:   Jurgen Berg is a 28 y.o. male with past psychiatric history of bipolar disorder, currently admitted with bipolar disorder.  Psychiatry has been consulted to address his mood.     Jurgen reports that he has been happy and he just wants happiness, especially with his brother and sister.  Jurgen has been speaking very loosely and evading direct questions.  He says that he just does not want to talk about it.  He denies depression.  He says that he just wants happiness and that people may not understand.  He says that his emotions are happy and that he deals with his emotions and the emotions of others.  He reports that his thoughts are "many."  He denies SI or HI saying that he never wants to die because he is so happy.  He denies anger and irritability.  He denies AVH and delusional thinking.  When furthered questioned about psychosis, he says, "You won't believe me so I won't talk about it."  He reports a fair appetite and describes sleep as fair.      Admit Mental Status Exam:  General Appearance: unremarkable, appears stated age, dressed in hospital garb  Arousal: alert  Behavior: cooperative  Movements and Motor Activity: no abnormal involuntary movements noted; no tics, no tremors, no akathisia, no dystonia, no evidence of tardive dyskinesia; no psychomotor agitation or retardation  Orientation: oriented to person only, oriented to year, oriented to month  Speech: verbose, pressured  Mood: Depressed, Anxious, Manic, and Guarded  Affect: labile  Thought Process: racing, bizarre  Associations: intact  Thought Content and Perceptions: no suicidal ideation, no homicidal ideation, + " "delusions, + suspicious  Recent and Remote Memory: intact  Attention and Concentration: easily distractible  Fund of Knowledge: correctly identifies the   Insight: poor  Judgment: poor      Diagnoses:  PRINCIPAL PROBLEM:  Bipolar I, most recent episode mixed, severe      PROBLEM LIST    Bipolar I, most recent episode mixed, severe    Cannabis dependence, continuous        Hospital Course:   Patient was admitted to [facility] and started on Risperdal.    9/11/23  Thoughts are circumstantial.  Requires prompting to redirect to conversation.  Very emotionally labile.  Became very tearful when talking about his brother who he "loves the most."  At his last hospitalization, he was on Abilify and hydroxyzine.  Risperdal started during this hospitalization.  Will discontinue and restart Abilify, but will do so at a higher dose.  Will monitor progress.     UDS: (+)cannabis  Blood alcohol: <10      9/12/23  Today patient states that he is doing really well. He states that he is tolerating the new medication [Abilify] well without issue. He states that he feels much more level and clear headed. Denies SI/HI and states he is no longer having AVH. Will continue with current POC and monitor progress.      9/13/23  Staff reports that patient has been odd.  Bizarre behavior, restless, frequently paces.  Today, he states that he's "never felt better."  Feels like he will be a "better man" when he returns home.  Eating and sleeping well.  Current discharge plan will be to return home.  Not attending groups.  Of note, less emotionally reactive.  Will titrate Abilify and will monitor progress.      9/14/23  Staff reports that patient has been calm and cooperative. He continues to pace but appears to be slightly less bizarre.  Today, he states that he's "continue to feel better every day."  Eating and sleeping well.  Current discharge plan will be to return home.  Not attending groups. Will continue with current " "POC and plan for discharge tomorrow       9/15/23  Today patient states that he is "100". He feels the medication has been very affective. He did state that he is having some sluggishness during the day and I educated him to move the Abilify to night. Staff reports that patient has been calm and cooperative. Eating and sleeping well.  Current discharge plan will be to return home.  Will continue with current POC and proceed with discharge today.       Current Medications:   Scheduled Meds:        DISCHARGE EXAMINATION    VITALS   Vitals:    09/12/23 1900 09/13/23 0701 09/14/23 0937 09/15/23 0705   BP: 114/84 (!) 152/83 127/86 125/85   BP Location: Left arm  Left arm Left arm   Patient Position:   Sitting Sitting   Pulse: 63 70 (!) 56 68   Resp: 17 16 18 18   Temp: 97.5 °F (36.4 °C) 97.8 °F (36.6 °C) 98 °F (36.7 °C) 97.9 °F (36.6 °C)   TempSrc: Oral  Oral Oral   SpO2: 96% 99%  97%   Weight:       Height:             Discharge Mental Status Exam:  General Appearance: appears stated age, well-developed, well-nourished  Arousal: alert  Behavior: cooperative  Movements and Motor Activity: no abnormal involuntary movements noted  Orientation: oriented to person, place, time, and situation  Speech: normal rate, normal rhythm, normal volume, normal tone  Mood: Elevated, but improved  Affect: improving  Thought Process: more linear  Associations: fair  Thought Content and Perceptions: grandiose delusions, no suicidal ideation, no homicidal ideation, no acute auditory hallucinations, no acute visual hallucinations  Recent and Remote Memory: recent memory intact, remote memory intact; per interview/observation with patient  Attention and Concentration: requires redirection; per interview/observation with patient  Fund of Knowledge: intact, aware of current events, vocabulary appropriate; based on history, vocabulary, fund of knowledge, syntax, grammar, and content  Insight: questionable; based on understanding of severity of " illness and HPI  Judgment: questionable; based on patient's behavior and HPI      Discharge Condition:  Stable    Prognosis:  Fair    Justification for >1 antipsychotic:  N/a    Disposition:  discharged to home    Follow-up:     Follow-up Information       Kristopher Figueroa [facility] - Follow up.    Contact information:  [address]  [place] [address]  [phone]               [facility] .    Specialties: Behavioral Health, Psychiatry, Psychology  Why: [email or web address]  Bring insurance card and id  Contact information:  [address] [place] [address] [address] [place] [address] [phone]                             Medication Regimen:  No current facility-administered medications for this encounter.    Current Outpatient Medications:     ARIPiprazole (ABILIFY) 20 MG Tab, Take 1 tablet (20 mg total) by mouth once daily., Disp: 30 tablet, Rfl: 0    nicotine (NICODERM CQ) 21 mg/24 hr, Place 1 patch onto the skin once daily., Disp: 28 patch, Rfl: 0      Patient Instructions:   Continue medication regimen as prescribed.    Disposition plan per  - see  notes for details.    Patient instructed to call 911 or present to emergency department if any of the following complications develop status post discharge: suicidality, homicidality, or grave disability.     Total time spent discharging patient: <30 minutes      Elfego Booth M.D.

## 2024-06-11 ENCOUNTER — HOSPITAL ENCOUNTER (INPATIENT)
Facility: HOSPITAL | Age: 29
LOS: 7 days | Discharge: HOME | DRG: 885 | End: 2024-06-18
Attending: PSYCHIATRY & NEUROLOGY | Admitting: PSYCHIATRY & NEUROLOGY
Payer: MEDICAID

## 2024-06-11 ENCOUNTER — HOSPITAL ENCOUNTER (EMERGENCY)
Facility: HOSPITAL | Age: 29
Discharge: PSYCHIATRIC OR SUBSTANCE USE FACILITY | End: 2024-06-11
Attending: INTERNAL MEDICINE
Payer: MEDICAID

## 2024-06-11 VITALS
TEMPERATURE: 98 F | SYSTOLIC BLOOD PRESSURE: 138 MMHG | HEIGHT: 65 IN | RESPIRATION RATE: 18 BRPM | HEART RATE: 94 BPM | BODY MASS INDEX: 19.99 KG/M2 | DIASTOLIC BLOOD PRESSURE: 86 MMHG | OXYGEN SATURATION: 100 % | WEIGHT: 120 LBS

## 2024-06-11 DIAGNOSIS — F29 PSYCHOSIS: ICD-10-CM

## 2024-06-11 DIAGNOSIS — Z00.8 MEDICAL CLEARANCE FOR PSYCHIATRIC ADMISSION: ICD-10-CM

## 2024-06-11 DIAGNOSIS — F31.2 BIPOLAR AFFECTIVE DISORDER, CURRENTLY MANIC, SEVERE, WITH PSYCHOTIC FEATURES: Primary | ICD-10-CM

## 2024-06-11 LAB
ALBUMIN SERPL-MCNC: 4.4 G/DL (ref 3.5–5)
ALBUMIN/GLOB SERPL: 1.4 RATIO (ref 1.1–2)
ALP SERPL-CCNC: 51 UNIT/L (ref 40–150)
ALT SERPL-CCNC: 14 UNIT/L (ref 0–55)
AMPHET UR QL SCN: NEGATIVE
ANION GAP SERPL CALC-SCNC: 8 MEQ/L
AST SERPL-CCNC: 17 UNIT/L (ref 5–34)
BACTERIA #/AREA URNS AUTO: ABNORMAL /HPF
BARBITURATE SCN PRESENT UR: NEGATIVE
BASOPHILS # BLD AUTO: 0.01 X10(3)/MCL
BASOPHILS NFR BLD AUTO: 0.1 %
BENZODIAZ UR QL SCN: POSITIVE
BILIRUB SERPL-MCNC: 1.6 MG/DL
BILIRUB UR QL STRIP.AUTO: NEGATIVE
BUN SERPL-MCNC: 14.4 MG/DL (ref 8.9–20.6)
CALCIUM SERPL-MCNC: 9.7 MG/DL (ref 8.4–10.2)
CANNABINOIDS UR QL SCN: POSITIVE
CASTS URNS MICRO: ABNORMAL /LPF
CHLORIDE SERPL-SCNC: 106 MMOL/L (ref 98–107)
CLARITY UR: CLEAR
CO2 SERPL-SCNC: 24 MMOL/L (ref 22–29)
COCAINE UR QL SCN: NEGATIVE
COLOR UR AUTO: ABNORMAL
CREAT SERPL-MCNC: 1.02 MG/DL (ref 0.73–1.18)
CREAT/UREA NIT SERPL: 14
EOSINOPHIL # BLD AUTO: 0 X10(3)/MCL (ref 0–0.9)
EOSINOPHIL NFR BLD AUTO: 0 %
ERYTHROCYTE [DISTWIDTH] IN BLOOD BY AUTOMATED COUNT: 12.1 % (ref 11.5–17)
ETHANOL SERPL-MCNC: <10 MG/DL
FENTANYL UR QL SCN: NEGATIVE
GFR SERPLBLD CREATININE-BSD FMLA CKD-EPI: >60 ML/MIN/1.73/M2
GLOBULIN SER-MCNC: 3.2 GM/DL (ref 2.4–3.5)
GLUCOSE SERPL-MCNC: 105 MG/DL (ref 74–100)
GLUCOSE UR QL STRIP: NORMAL
HCT VFR BLD AUTO: 44.9 % (ref 42–52)
HGB BLD-MCNC: 15.8 G/DL (ref 14–18)
HGB UR QL STRIP: NEGATIVE
HOLD SPECIMEN: NORMAL
HOLD SPECIMEN: NORMAL
HYALINE CASTS #/AREA URNS LPF: ABNORMAL /LPF
IMM GRANULOCYTES # BLD AUTO: 0.02 X10(3)/MCL (ref 0–0.04)
IMM GRANULOCYTES NFR BLD AUTO: 0.2 %
KETONES UR QL STRIP: ABNORMAL
LEUKOCYTE ESTERASE UR QL STRIP: NEGATIVE
LYMPHOCYTES # BLD AUTO: 0.84 X10(3)/MCL (ref 0.6–4.6)
LYMPHOCYTES NFR BLD AUTO: 10.2 %
MCH RBC QN AUTO: 30.8 PG (ref 27–31)
MCHC RBC AUTO-ENTMCNC: 35.2 G/DL (ref 33–36)
MCV RBC AUTO: 87.5 FL (ref 80–94)
MDMA UR QL SCN: NEGATIVE
MONOCYTES # BLD AUTO: 0.44 X10(3)/MCL (ref 0.1–1.3)
MONOCYTES NFR BLD AUTO: 5.4 %
MUCOUS THREADS URNS QL MICRO: ABNORMAL /LPF
NEUTROPHILS # BLD AUTO: 6.9 X10(3)/MCL (ref 2.1–9.2)
NEUTROPHILS NFR BLD AUTO: 84.1 %
NITRITE UR QL STRIP: NEGATIVE
NRBC BLD AUTO-RTO: 0 %
OPIATES UR QL SCN: NEGATIVE
PCP UR QL: NEGATIVE
PH UR STRIP: 6 [PH]
PH UR: 6 [PH] (ref 3–11)
PLATELET # BLD AUTO: 211 X10(3)/MCL (ref 130–400)
PMV BLD AUTO: 9.6 FL (ref 7.4–10.4)
POTASSIUM SERPL-SCNC: 3.7 MMOL/L (ref 3.5–5.1)
PROT SERPL-MCNC: 7.6 GM/DL (ref 6.4–8.3)
PROT UR QL STRIP: NEGATIVE
RBC # BLD AUTO: 5.13 X10(6)/MCL (ref 4.7–6.1)
RBC #/AREA URNS AUTO: ABNORMAL /HPF
SARS-COV-2 RDRP RESP QL NAA+PROBE: NEGATIVE
SODIUM SERPL-SCNC: 138 MMOL/L (ref 136–145)
SP GR UR STRIP.AUTO: 1.01 (ref 1–1.03)
SPECIFIC GRAVITY, URINE AUTO (.000) (OHS): 1.01 (ref 1–1.03)
SQUAMOUS #/AREA URNS LPF: ABNORMAL /HPF
TSH SERPL-ACNC: 2.67 UIU/ML (ref 0.35–4.94)
UROBILINOGEN UR STRIP-ACNC: NORMAL
WBC # SPEC AUTO: 8.21 X10(3)/MCL (ref 4.5–11.5)
WBC #/AREA URNS AUTO: ABNORMAL /HPF

## 2024-06-11 PROCEDURE — 82077 ASSAY SPEC XCP UR&BREATH IA: CPT | Performed by: INTERNAL MEDICINE

## 2024-06-11 PROCEDURE — 80307 DRUG TEST PRSMV CHEM ANLYZR: CPT | Performed by: INTERNAL MEDICINE

## 2024-06-11 PROCEDURE — 85025 COMPLETE CBC W/AUTO DIFF WBC: CPT | Performed by: INTERNAL MEDICINE

## 2024-06-11 PROCEDURE — 84443 ASSAY THYROID STIM HORMONE: CPT | Performed by: INTERNAL MEDICINE

## 2024-06-11 PROCEDURE — U0002 COVID-19 LAB TEST NON-CDC: HCPCS | Performed by: INTERNAL MEDICINE

## 2024-06-11 PROCEDURE — 96372 THER/PROPH/DIAG INJ SC/IM: CPT | Performed by: INTERNAL MEDICINE

## 2024-06-11 PROCEDURE — 99285 EMERGENCY DEPT VISIT HI MDM: CPT | Mod: 25

## 2024-06-11 PROCEDURE — 80053 COMPREHEN METABOLIC PANEL: CPT | Performed by: INTERNAL MEDICINE

## 2024-06-11 PROCEDURE — 81015 MICROSCOPIC EXAM OF URINE: CPT | Performed by: INTERNAL MEDICINE

## 2024-06-11 PROCEDURE — 11400000 HC PSYCH PRIVATE ROOM

## 2024-06-11 PROCEDURE — 93005 ELECTROCARDIOGRAM TRACING: CPT

## 2024-06-11 PROCEDURE — 63600175 PHARM REV CODE 636 W HCPCS: Performed by: INTERNAL MEDICINE

## 2024-06-11 RX ORDER — ACETAMINOPHEN 325 MG/1
650 TABLET ORAL EVERY 6 HOURS PRN
Status: DISCONTINUED | OUTPATIENT
Start: 2024-06-11 | End: 2024-06-18 | Stop reason: HOSPADM

## 2024-06-11 RX ORDER — LORAZEPAM 1 MG/1
2 TABLET ORAL EVERY 4 HOURS PRN
Status: DISCONTINUED | OUTPATIENT
Start: 2024-06-11 | End: 2024-06-18 | Stop reason: HOSPADM

## 2024-06-11 RX ORDER — ADHESIVE BANDAGE
30 BANDAGE TOPICAL DAILY PRN
Status: DISCONTINUED | OUTPATIENT
Start: 2024-06-11 | End: 2024-06-18 | Stop reason: HOSPADM

## 2024-06-11 RX ORDER — DIPHENHYDRAMINE HCL 50 MG
50 CAPSULE ORAL EVERY 4 HOURS PRN
Status: DISCONTINUED | OUTPATIENT
Start: 2024-06-11 | End: 2024-06-18 | Stop reason: HOSPADM

## 2024-06-11 RX ORDER — IBUPROFEN 200 MG
1 TABLET ORAL DAILY
Status: DISCONTINUED | OUTPATIENT
Start: 2024-06-12 | End: 2024-06-17

## 2024-06-11 RX ORDER — DIPHENHYDRAMINE HYDROCHLORIDE 50 MG/ML
50 INJECTION INTRAMUSCULAR; INTRAVENOUS EVERY 4 HOURS PRN
Status: DISCONTINUED | OUTPATIENT
Start: 2024-06-11 | End: 2024-06-18 | Stop reason: HOSPADM

## 2024-06-11 RX ORDER — LORAZEPAM 2 MG/ML
2 INJECTION INTRAMUSCULAR EVERY 4 HOURS PRN
Status: DISCONTINUED | OUTPATIENT
Start: 2024-06-11 | End: 2024-06-18 | Stop reason: HOSPADM

## 2024-06-11 RX ORDER — HALOPERIDOL 5 MG/ML
10 INJECTION INTRAMUSCULAR EVERY 4 HOURS PRN
Status: DISCONTINUED | OUTPATIENT
Start: 2024-06-11 | End: 2024-06-18 | Stop reason: HOSPADM

## 2024-06-11 RX ORDER — IBUPROFEN 200 MG
1 TABLET ORAL DAILY
Status: DISCONTINUED | OUTPATIENT
Start: 2024-06-11 | End: 2024-06-11 | Stop reason: HOSPADM

## 2024-06-11 RX ORDER — ALUMINUM HYDROXIDE, MAGNESIUM HYDROXIDE, AND SIMETHICONE 1200; 120; 1200 MG/30ML; MG/30ML; MG/30ML
30 SUSPENSION ORAL EVERY 6 HOURS PRN
Status: DISCONTINUED | OUTPATIENT
Start: 2024-06-11 | End: 2024-06-18 | Stop reason: HOSPADM

## 2024-06-11 RX ORDER — MIDAZOLAM HYDROCHLORIDE 2 MG/2ML
2 INJECTION, SOLUTION INTRAMUSCULAR; INTRAVENOUS ONCE
Status: COMPLETED | OUTPATIENT
Start: 2024-06-11 | End: 2024-06-11

## 2024-06-11 RX ORDER — ONDANSETRON 4 MG/1
4 TABLET, ORALLY DISINTEGRATING ORAL EVERY 6 HOURS PRN
Status: DISCONTINUED | OUTPATIENT
Start: 2024-06-11 | End: 2024-06-18 | Stop reason: HOSPADM

## 2024-06-11 RX ORDER — TRAZODONE HYDROCHLORIDE 100 MG/1
100 TABLET ORAL NIGHTLY PRN
Status: DISCONTINUED | OUTPATIENT
Start: 2024-06-11 | End: 2024-06-18 | Stop reason: HOSPADM

## 2024-06-11 RX ORDER — HYDROXYZINE HYDROCHLORIDE 50 MG/1
50 TABLET, FILM COATED ORAL EVERY 4 HOURS PRN
Status: DISCONTINUED | OUTPATIENT
Start: 2024-06-11 | End: 2024-06-18 | Stop reason: HOSPADM

## 2024-06-11 RX ORDER — HALOPERIDOL 5 MG/1
10 TABLET ORAL EVERY 4 HOURS PRN
Status: DISCONTINUED | OUTPATIENT
Start: 2024-06-11 | End: 2024-06-18 | Stop reason: HOSPADM

## 2024-06-11 RX ORDER — ZIPRASIDONE MESYLATE 20 MG/ML
20 INJECTION, POWDER, LYOPHILIZED, FOR SOLUTION INTRAMUSCULAR
Status: COMPLETED | OUTPATIENT
Start: 2024-06-11 | End: 2024-06-11

## 2024-06-11 RX ADMIN — ZIPRASIDONE MESYLATE 20 MG: 20 INJECTION, POWDER, LYOPHILIZED, FOR SOLUTION INTRAMUSCULAR at 02:06

## 2024-06-11 RX ADMIN — MIDAZOLAM HYDROCHLORIDE 2 MG: 1 INJECTION, SOLUTION INTRAMUSCULAR; INTRAVENOUS at 02:06

## 2024-06-11 NOTE — ED PROVIDER NOTES
Encounter Date: 6/11/2024       History     Chief Complaint   Patient presents with    Psychiatric Evaluation     Pt to ER via EMS and [employer], pt's coworker called [employer] due to pt having rapid speech and erratic behavior. Hx of Bipolar depression, noncompliant w/ meds.      Presents by police with abnormal behavior. Apparently patient has history of bipolar disorder. Police states were called by a co-worker due to rambling speech at work. Pt with erratic changes in mood, having fly of ideas and grandiosity delusions, unable to be redirected. Pt claims the world in dying, he just wants to save the world, speaks also about his father and dogs; intermittent change in language to what sound like chinese.     The history is provided by the patient, the police and the EMS personnel.     Review of patient's allergies indicates:  No Known Allergies  Past Medical History:   Diagnosis Date    Bipolar disorder      History reviewed. No pertinent surgical history.  No family history on file.  Social History     Substance Use Topics    Alcohol use: Not Currently    Drug use: Yes     Types: Marijuana     Review of Systems   Unable to perform ROS: Psychiatric disorder       Physical Exam     Initial Vitals   BP Pulse Resp Temp SpO2   06/11/24 1415 06/11/24 1415 06/11/24 1415 06/11/24 1417 06/11/24 1415   (!) 145/90 105 20 97.5 °F (36.4 °C) 100 %      MAP       --                Physical Exam    Nursing note and vitals reviewed.  Constitutional: He appears well-developed.   HENT:   Head: Normocephalic and atraumatic.   Mouth/Throat: Oropharynx is clear and moist.   Eyes: Conjunctivae and EOM are normal. Pupils are equal, round, and reactive to light.   Neck: Neck supple. No thyromegaly present. No JVD present.   Normal range of motion.  Cardiovascular:  Regular rhythm, normal heart sounds and intact distal pulses.           Pulmonary/Chest: Breath sounds normal. No respiratory distress.   Abdominal: Abdomen is soft. Bowel sounds are  normal. He exhibits no distension. There is no abdominal tenderness. There is no rebound and no guarding.   Musculoskeletal:         General: No edema. Normal range of motion.      Cervical back: Normal range of motion and neck supple.     Neurological: He is alert. He has normal strength.   Skin: Skin is warm and dry. No rash noted.   Psychiatric: His affect is labile and inappropriate. His speech is rapid and/or pressured and tangential. He is agitated and withdrawn. Thought content is delusional. Cognition and memory are impaired. He expresses impulsivity and inappropriate judgment.   Pt was withdraw, crying in the corner of the room, sitting on the floor. Then stand up screaming and punching the wall. He is inattentive.       ED Course   Procedures  Labs Reviewed   COMPREHENSIVE METABOLIC PANEL - Abnormal; Notable for the following components:       Result Value    Glucose 105 (*)     Bilirubin Total 1.6 (*)     All other components within normal limits   TSH - Normal   ALCOHOL,MEDICAL (ETHANOL) - Normal   CBC W/ AUTO DIFFERENTIAL    Narrative:     The following orders were created for panel order CBC auto differential.  Procedure                               Abnormality         Status                     ---------                               -----------         ------                     CBC with Differential[[id number]]                           Final result                 Please view results for these tests on the individual orders.   CBC WITH DIFFERENTIAL   URINALYSIS, REFLEX TO URINE CULTURE   DRUG SCREEN, URINE (BEAKER)   SARS-COV-2 RNA AMPLIFICATION, QUAL   EXTRA TUBES    Narrative:     The following orders were created for panel order EXTRA TUBES.  Procedure                               Abnormality         Status                     ---------                               -----------         ------                     Light Blue Top Hold[[id number]]                             In process                  Red Top Hold[[id number]]                                    In process                   Please view results for these tests on the individual orders.   LIGHT BLUE TOP HOLD   RED TOP HOLD     EKG Readings: (Independently Interpreted)   Initial Reading: No STEMI. Rhythm: Normal Sinus Rhythm. Heart Rate: 64. Ectopy: No Ectopy. Conduction: Normal. ST Segments: Normal ST Segments. T Waves: Normal. Clinical Impression: Normal Sinus Rhythm       Imaging Results    None          Medications   nicotine 14 mg/24 hr 1 patch (1 patch Transdermal Not Given 6/11/24 1430)   ziprasidone injection 20 mg (20 mg Intramuscular Given 6/11/24 1439)   midazolam (PF) (VERSED) 1 mg/mL injection 2 mg (2 mg Intramuscular Given 6/11/24 1439)     Medical Decision Making  Amount and/or Complexity of Data Reviewed  Independent Historian:      Details: Police states were called by a co-worker due to rambling speech at work.   Labs: ordered. Decision-making details documented in ED Course.  ECG/medicine tests: ordered and independent interpretation performed. Decision-making details documented in ED Course.  Discussion of management or test interpretation with external provider(s): Case discussed with our psychiatric nurse for transfer. Information will be faxed to local psychiatric institutions for placement. MD will be available for report if needed after nurse report. Patient medically cleared and stable at this time for transfer process.       Risk  OTC drugs.  Prescription drug management.      Additional MDM:   Differential Diagnosis:   Schizophrenia exacerbation, bipolar psychosis, drug induced psychosis, thyrotoxicosis, renal failure, liver failure, toxydrome, among others                  Medically cleared for psychiatry placement: 6/11/2024  4:24 PM                   Clinical Impression:  Final diagnoses:  [Z00.8] Medical clearance for psychiatric admission  [F31.2] Bipolar affective disorder, currently manic, severe, with psychotic  features (Primary)          ED Disposition Condition    Transfer to Psych Facility [facility]          ED Prescriptions    None       Follow-up Information    None          Bhavik Carlson MD  06/11/24 1625       Bhavik Carlson MD  06/11/24 1623

## 2024-06-12 PROBLEM — F12.90 MARIJUANA USE: Status: ACTIVE | Noted: 2024-06-12

## 2024-06-12 LAB
ALBUMIN SERPL-MCNC: 4.6 G/DL (ref 3.5–5)
ALBUMIN/GLOB SERPL: 1.4 RATIO (ref 1.1–2)
ALP SERPL-CCNC: 51 UNIT/L (ref 40–150)
ALT SERPL-CCNC: 15 UNIT/L (ref 0–55)
ANION GAP SERPL CALC-SCNC: 11 MEQ/L
AST SERPL-CCNC: 19 UNIT/L (ref 5–34)
BASOPHILS # BLD AUTO: 0.03 X10(3)/MCL
BASOPHILS NFR BLD AUTO: 0.3 %
BILIRUB SERPL-MCNC: 2.1 MG/DL
BUN SERPL-MCNC: 16.3 MG/DL (ref 8.9–20.6)
CALCIUM SERPL-MCNC: 10.3 MG/DL (ref 8.4–10.2)
CHLORIDE SERPL-SCNC: 102 MMOL/L (ref 98–107)
CHOLEST SERPL-MCNC: 165 MG/DL
CHOLEST/HDLC SERPL: 3 {RATIO} (ref 0–5)
CO2 SERPL-SCNC: 23 MMOL/L (ref 22–29)
CREAT SERPL-MCNC: 0.94 MG/DL (ref 0.73–1.18)
CREAT/UREA NIT SERPL: 17
EOSINOPHIL # BLD AUTO: 0.03 X10(3)/MCL (ref 0–0.9)
EOSINOPHIL NFR BLD AUTO: 0.3 %
ERYTHROCYTE [DISTWIDTH] IN BLOOD BY AUTOMATED COUNT: 12.4 % (ref 11.5–17)
EST. AVERAGE GLUCOSE BLD GHB EST-MCNC: 99.7 MG/DL
GFR SERPLBLD CREATININE-BSD FMLA CKD-EPI: >60 ML/MIN/1.73/M2
GLOBULIN SER-MCNC: 3.3 GM/DL (ref 2.4–3.5)
GLUCOSE SERPL-MCNC: 76 MG/DL (ref 74–100)
HBA1C MFR BLD: 5.1 %
HCT VFR BLD AUTO: 48.1 % (ref 42–52)
HDLC SERPL-MCNC: 48 MG/DL (ref 35–60)
HGB BLD-MCNC: 16.6 G/DL (ref 14–18)
IMM GRANULOCYTES # BLD AUTO: 0.03 X10(3)/MCL (ref 0–0.04)
IMM GRANULOCYTES NFR BLD AUTO: 0.3 %
LDLC SERPL CALC-MCNC: 103 MG/DL (ref 50–140)
LYMPHOCYTES # BLD AUTO: 2.09 X10(3)/MCL (ref 0.6–4.6)
LYMPHOCYTES NFR BLD AUTO: 24 %
MCH RBC QN AUTO: 30.3 PG (ref 27–31)
MCHC RBC AUTO-ENTMCNC: 34.5 G/DL (ref 33–36)
MCV RBC AUTO: 87.8 FL (ref 80–94)
MONOCYTES # BLD AUTO: 0.75 X10(3)/MCL (ref 0.1–1.3)
MONOCYTES NFR BLD AUTO: 8.6 %
NEUTROPHILS # BLD AUTO: 5.77 X10(3)/MCL (ref 2.1–9.2)
NEUTROPHILS NFR BLD AUTO: 66.5 %
NRBC BLD AUTO-RTO: 0 %
OHS QRS DURATION: 94 MS
OHS QTC CALCULATION: 416 MS
PLATELET # BLD AUTO: 236 X10(3)/MCL (ref 130–400)
PMV BLD AUTO: 10.7 FL (ref 7.4–10.4)
POTASSIUM SERPL-SCNC: 4.1 MMOL/L (ref 3.5–5.1)
PROT SERPL-MCNC: 7.9 GM/DL (ref 6.4–8.3)
RBC # BLD AUTO: 5.48 X10(6)/MCL (ref 4.7–6.1)
SODIUM SERPL-SCNC: 136 MMOL/L (ref 136–145)
T PALLIDUM AB SER QL: NONREACTIVE
TRIGL SERPL-MCNC: 68 MG/DL (ref 34–140)
TSH SERPL-ACNC: 1.68 UIU/ML (ref 0.35–4.94)
VLDLC SERPL CALC-MCNC: 14 MG/DL
WBC # SPEC AUTO: 8.7 X10(3)/MCL (ref 4.5–11.5)

## 2024-06-12 PROCEDURE — 11400000 HC PSYCH PRIVATE ROOM

## 2024-06-12 PROCEDURE — 83036 HEMOGLOBIN GLYCOSYLATED A1C: CPT | Performed by: PSYCHIATRY & NEUROLOGY

## 2024-06-12 PROCEDURE — 25000003 PHARM REV CODE 250: Performed by: PSYCHIATRY & NEUROLOGY

## 2024-06-12 PROCEDURE — 80053 COMPREHEN METABOLIC PANEL: CPT | Performed by: PSYCHIATRY & NEUROLOGY

## 2024-06-12 PROCEDURE — 84443 ASSAY THYROID STIM HORMONE: CPT | Performed by: PSYCHIATRY & NEUROLOGY

## 2024-06-12 PROCEDURE — 86780 TREPONEMA PALLIDUM: CPT | Performed by: PSYCHIATRY & NEUROLOGY

## 2024-06-12 PROCEDURE — 80061 LIPID PANEL: CPT | Performed by: PSYCHIATRY & NEUROLOGY

## 2024-06-12 PROCEDURE — 85025 COMPLETE CBC W/AUTO DIFF WBC: CPT | Performed by: PSYCHIATRY & NEUROLOGY

## 2024-06-12 RX ORDER — OLANZAPINE 5 MG/1
5 TABLET ORAL 2 TIMES DAILY
Status: DISCONTINUED | OUTPATIENT
Start: 2024-06-12 | End: 2024-06-18 | Stop reason: HOSPADM

## 2024-06-12 RX ADMIN — OLANZAPINE 5 MG: 5 TABLET, FILM COATED ORAL at 10:06

## 2024-06-12 RX ADMIN — OLANZAPINE 5 MG: 5 TABLET, FILM COATED ORAL at 08:06

## 2024-06-12 RX ADMIN — HYDROXYZINE HYDROCHLORIDE 50 MG: 50 TABLET, FILM COATED ORAL at 04:06

## 2024-06-12 NOTE — NURSING
"PRN Administration Note:    Behavior:    Patient (Jurgen Berg is a 28 y.o. male, : 1995, MRN: [id number])     Allergies: Patient has no known allergies.    Jurgen's  height is 5' 5" (1.651 m) and weight is 56.4 kg (124 lb 6.4 oz). His temperature is 97.9 °F (36.6 °C). His blood pressure is 124/67 and his pulse is 68. His respiration is 18.     Reason for PRN Administration: anxiety__________.    Intervention:    Administered atarax_______ per physician order to Jrugen       Response:    Jurgen tolerated administration well.      Plan:     Continue to monitor per MD/PA/APRN orders; and reevaluate medication effectiveness within 30 minutes.   "

## 2024-06-12 NOTE — PROGRESS NOTES
"Jurgen is a 29y/o male admitted for Bipolar Disorder, most recent episode manic, severe, with psychosis (F31.2) and Cannabis use disorder (F12.20) with a uds +benzos and cannabis. CTRS met with Pt 1:1, Juan was cooperative but appeared manic AEB bizarre and tangential thought process, and reported ability to perform his ADL's. CTRS educated Pt to TR group times and dates with Jurgen agreeing to attend and participate in TR groups. Jurgen reported his TR treatment goals as "Medication and relaxation techniques".       06/12/24 0856   General   Admit Date 06/11/24   Primary Diagnosis Bipolar Disorder, most recent episode manic, severe, with psychosis (F31.2)   Secondary Diagnosis Cannabis use disorder (F12.20)   Taoism spiritual   Number of Children 0   Children Living? 0   Occupation    Does the patient have dentures? No   If you were to take part in activities, which of the following would you prefer? Both   Do you feel like you have enough to keep you busy now? Yes   Do you believe that you have the opportunity for physical activity? Yes   Activity Capabilities Maximum   Subjective   Patient states No explanation, nothing, nota   Assessment   Mobility ambulates independently   Transfers independently   Musculoskeletal pain  (c/o lower back pain)   Visual Acuity normal vision   Visual Perception depth perception;color perception;recognizes letters;recognizes numbers   Hearing normal   Speech/Communication normal   Cognitive Concerns oriented x4   Emotional Concerns   (appears manic, distracted and confused)   Leisure Interest Survey   Leisure Interest Survey Yes   Solitary Activities   Watching TV Current Interest   Watching Videos Current Interest   Music Listening Current Interest   Reading Current Interest   Physical Activities   Dancing Current Interest   Fitness/Exercise Programs Current Interest   Weightlifting Current Interest   Walk/Run Current Interest   Creative Activities   Creative " Writing Current Interest   Singing Current Interest   Cooking Current Interest   Passive Games   Classic Board Games Current Interest   Goals   Additional Documentation yes   Goal Formulation With patient   Time For Goal Achievement 7 days   Goal 1 Meditation and relaxation techniques   Goal 1-Progress ongoing-   Plan   Planned Therapy Intervention Group Recreational Therapy   Expected Length of Stay 5-7days   PT Frequency Minimum of 3 visits per week

## 2024-06-12 NOTE — PROGRESS NOTES
06/12/24 1400   [facility] Group Therapy   Group Name Therapeutic Recreation   Specific Interventions Skilled Activity Creative Expression   Participation Level None   Participation Quality Sleeping  (excused)

## 2024-06-12 NOTE — PLAN OF CARE
Problem: Psychotic Signs/Symptoms  Goal: Improved Behavioral Control (Psychotic Signs/Symptoms)  Outcome: Not Progressing  Flowsheets (Taken 6/12/2024 1351)  Mutually Determined Action Steps (Improved Behavioral Control): identifies symptoms triggers  Intervention: Manage Behavior  Flowsheets (Taken 6/12/2024 1351)  De-Escalation Techniques: appropriate behavior reinforced  Goal: Optimal Cognitive Function (Psychotic Signs/Symptoms)  Outcome: Not Progressing  Flowsheets (Taken 6/12/2024 1351)  Mutually Determined Action Steps (Optimal Cognitive Function): participates in problem resolution  Intervention: Support and Promote Cognitive Ability  Flowsheets (Taken 6/12/2024 1351)  Trust Relationship/Rapport: care explained  Goal: Increased Participation and Engagement (Psychotic Signs/Symptoms)  Outcome: Not Progressing  Intervention: Facilitate Participation and Engagement  Flowsheets (Taken 6/12/2024 1351)  Supportive Measures: active listening utilized  Patient is confused in treatment team, he is actively hallucinating.  His answer arr hyper Confucianist, and grandiose.  He is also looking over his shoulder into the zendejas, but denies being paranoid.

## 2024-06-12 NOTE — PLAN OF CARE
Problem: Adult Behavioral Health Plan of Care  Goal: Plan of Care Review  Outcome: Not Progressing  Goal: Patient-Specific Goal (Individualization)  Outcome: Not Progressing  Goal: Adheres to Safety Considerations for Self and Others  Outcome: Not Progressing  Goal: Absence of New-Onset Illness or Injury  Outcome: Not Progressing  Goal: Optimized Coping Skills in Response to Life Stressors  Outcome: Not Progressing  Goal: Develops/Participates in Therapeutic [place] to Support Successful Transition  Outcome: Not Progressing  Goal: Rounds/Family Conference  Outcome: Not Progressing     Problem: Violence Risk or Actual  Goal: Anger and Impulse Control  Outcome: Not Progressing     Problem: Psychotic Signs/Symptoms  Goal: Improved Behavioral Control (Psychotic Signs/Symptoms)  Outcome: Not Progressing  Goal: Optimal Cognitive Function (Psychotic Signs/Symptoms)  Outcome: Not Progressing  Goal: Increased Participation and Engagement (Psychotic Signs/Symptoms)  Outcome: Not Progressing  Goal: Improved Mood Symptoms (Psychotic Signs/Symptoms)  Outcome: Not Progressing  Goal: Improved Psychomotor Symptoms (Psychotic Signs/Symptoms)  Outcome: Not Progressing  Goal: Decreased Sensory Symptoms (Psychotic Signs/Symptoms)  Outcome: Not Progressing  Goal: Improved Sleep (Psychotic Signs/Symptoms)  Outcome: Not Progressing  Goal: Enhanced Social, Occupational or Functional Skills (Psychotic Signs/Symptoms)  Outcome: Not Progressing

## 2024-06-12 NOTE — H&P
"6/12/2024  Jurgen Berg   1995   [id number]            Psychiatry Inpatient Admission Note    Date of Admission: 6/11/2024  7:30 PM    Current Legal Status: Physician's Emergency Certificate    Chief Complaint: "Police picked me up"    SUBJECTIVE:   History of Present Illness:   Jurgen Berg is a 28 y.o. male placed under a PEC at [facility] after his coworker called [employer] due to patient's rapid speech and erratic.    Patient states that he was at work and the police came and arrested him.  He was last here in September of last year.  He was on Abilify 20mg daily.  He admits that he has not been taking any medication and has not followed up with any mental health services.  States that he does not need these.    Rambles.  Grandiose.  Somewhat irritable.  Will start on Zyprexa here and will start a forced medication protocol if needed due to acuity of current symptoms.    UDS: (+)benzodiazepines, cannabis  Blood alcohol: <10    Past Psychiatric History:   Previous Psychiatric Hospitalizations: Numerous inpatient hospitalizations  Previous Medication Trials: Abilify  Previous Suicide Attempts: None   Outpatient psychiatrist: None    Current Medications:   Home Psychiatric Meds: None current    Past Medical/Surgical History:   Past Medical History:   Diagnosis Date    Bipolar disorder      No past surgical history on file.      Family Psychiatric History:   Denies     Allergies:   Review of patient's allergies indicates:  No Known Allergies    Substance Abuse History:   Tobacco: Denies  Alcohol: Denies  Illicit Substances: Cannabis  Treatment: Denies        Scheduled Meds:    nicotine  1 patch Transdermal Daily      PRN Meds:   Current Facility-Administered Medications:     acetaminophen, 650 mg, Oral, Q6H PRN    aluminum-magnesium hydroxide-simethicone, 30 mL, Oral, Q6H PRN    haloperidoL, 10 mg, Oral, Q4H PRN **AND** diphenhydrAMINE, 50 mg, Oral, Q4H PRN **AND** LORazepam, 2 mg, Oral, Q4H PRN **AND** haloperidol lactate, 10 " "mg, Intramuscular, Q4H PRN **AND** diphenhydrAMINE, 50 mg, Intramuscular, Q4H PRN **AND** lorazepam, 2 mg, Intramuscular, Q4H PRN    hydrOXYzine HCL, 50 mg, Oral, Q4H PRN    magnesium hydroxide 400 mg/5 ml, 30 mL, Oral, Daily PRN    ondansetron, 4 mg, Oral, Q6H PRN    traZODone, 100 mg, Oral, Nightly PRN   Psychotherapeutics (From admission, onward)      Start     Stop Route Frequency Ordered    06/11/24 1943  traZODone tablet 100 mg         -- Oral Nightly PRN 06/11/24 1943 06/11/24 1942  haloperidoL tablet 10 mg  (Med - Acute  Behavioral Management)        Placed in "And" Linked Group    -- Oral Every 4 hours PRN 06/11/24 1942 06/11/24 1942  LORazepam tablet 2 mg  (Med - Acute  Behavioral Management)        Placed in "And" Linked Group    -- Oral Every 4 hours PRN 06/11/24 1942 06/11/24 1942  haloperidol lactate injection 10 mg  (Med - Acute  Behavioral Management)        Placed in "And" Linked Group    -- IM Every 4 hours PRN 06/11/24 1942 06/11/24 1942  LORazepam injection 2 mg  (Med - Acute  Behavioral Management)        Placed in "And" Linked Group    -- IM Every 4 hours PRN 06/11/24 1942              Social History:  Housing Status: Lives in [place]  Relationship Status/Sexual Orientation: Never    Children: None  Education: Associate's degree   Employment Status/Info: Employed    history: None  History of physical/sexual abuse: None   Access to gun: None       Legal History:   Past Charges/Incarcerations: Denies   Pending charges: Denies      OBJECTIVE:   Medical Review Of Systems:  Constitutional: negative  Respiratory: negative  Cardiovascular: negative  Gastrointestinal: negative  Genitourinary:negative  Musculoskeletal:negative  Neurological: negative     Vitals   Vitals:    06/11/24 1951   BP: 124/67   Pulse: 68   Resp: 18   Temp: 97.9 °F (36.6 °C)        Labs/Imaging/Studies:   Recent Results (from the past 48 hour(s))   COVID-19 Rapid Screening    Collection Time: 06/11/24 "  2:44 PM   Result Value Ref Range    SARS COV-2 Molecular Negative Negative   Comprehensive metabolic panel    Collection Time: 06/11/24  3:25 PM   Result Value Ref Range    Sodium 138 136 - 145 mmol/L    Potassium 3.7 3.5 - 5.1 mmol/L    Chloride 106 98 - 107 mmol/L    CO2 24 22 - 29 mmol/L    Glucose 105 (H) 74 - 100 mg/dL    Blood Urea Nitrogen 14.4 8.9 - 20.6 mg/dL    Creatinine 1.02 0.73 - 1.18 mg/dL    Calcium 9.7 8.4 - 10.2 mg/dL    Protein Total 7.6 6.4 - 8.3 gm/dL    Albumin 4.4 3.5 - 5.0 g/dL    Globulin 3.2 2.4 - 3.5 gm/dL    Albumin/Globulin Ratio 1.4 1.1 - 2.0 ratio    Bilirubin Total 1.6 (H) <=1.5 mg/dL    ALP 51 40 - 150 unit/L    ALT 14 0 - 55 unit/L    AST 17 5 - 34 unit/L    eGFR >60 mL/min/1.73/m2    Anion Gap 8.0 mEq/L    BUN/Creatinine Ratio 14    TSH    Collection Time: 06/11/24  3:25 PM   Result Value Ref Range    TSH 2.675 0.350 - 4.940 uIU/mL   Ethanol    Collection Time: 06/11/24  3:25 PM   Result Value Ref Range    Ethanol Level <10.0 <=10.0 mg/dL   CBC with Differential    Collection Time: 06/11/24  3:25 PM   Result Value Ref Range    WBC 8.21 4.50 - 11.50 x10(3)/mcL    RBC 5.13 4.70 - 6.10 x10(6)/mcL    Hgb 15.8 14.0 - 18.0 g/dL    Hct 44.9 42.0 - 52.0 %    MCV 87.5 80.0 - 94.0 fL    MCH 30.8 27.0 - 31.0 pg    MCHC 35.2 33.0 - 36.0 g/dL    RDW 12.1 11.5 - 17.0 %    Platelet 211 130 - 400 x10(3)/mcL    MPV 9.6 7.4 - 10.4 fL    Neut % 84.1 %    Lymph % 10.2 %    Mono % 5.4 %    Eos % 0.0 %    Basophil % 0.1 %    Lymph # 0.84 0.6 - 4.6 x10(3)/mcL    Neut # 6.90 2.1 - 9.2 x10(3)/mcL    Mono # 0.44 0.1 - 1.3 x10(3)/mcL    Eos # 0.00 0 - 0.9 x10(3)/mcL    Baso # 0.01 <=0.2 x10(3)/mcL    IG# 0.02 0 - 0.04 x10(3)/mcL    IG% 0.2 %    NRBC% 0.0 %   Light Blue Top Hold    Collection Time: 06/11/24  3:35 PM   Result Value Ref Range    Extra Tube Hold for add-ons.    Red Top Hold    Collection Time: 06/11/24  3:35 PM   Result Value Ref Range    Extra Tube Hold for add-ons.    Urinalysis, Reflex to  Urine Culture    Collection Time: 06/11/24  4:15 PM    Specimen: Urine   Result Value Ref Range    Color, UA Light-Yellow Yellow, Light-Yellow, Dark Yellow, Theresa, Straw    Appearance, UA Clear Clear    Specific Gravity, UA 1.012 1.005 - 1.030    pH, UA 6.0 5.0 - 8.5    Protein, UA Negative Negative    Glucose, UA Normal Negative, Normal    Ketones, UA Trace (A) Negative    Blood, UA Negative Negative    Bilirubin, UA Negative Negative    Urobilinogen, UA Normal 0.2, 1.0, Normal    Nitrites, UA Negative Negative    Leukocyte Esterase, UA Negative Negative    WBC, UA 0-5 None Seen, 0-2, 3-5, 0-5 /HPF    Bacteria, UA None Seen None Seen /HPF    Squamous Epithelial Cells, UA None Seen None Seen /HPF    Mucous, UA Trace (A) None Seen /LPF    Unclassified Cast, UA 0-2 (A) None Seen /LPF    Hyaline Casts, UA None Seen None Seen /lpf    RBC, UA 0-5 None Seen, 0-2, 3-5, 0-5 /HPF   Drug Screen, Urine    Collection Time: 06/11/24  4:15 PM   Result Value Ref Range    Amphetamines, Urine Negative Negative    Barbiturates, Urine Negative Negative    Benzodiazepine, Urine Positive (A) Negative    Cannabinoids, Urine Positive (A) Negative    Cocaine, Urine Negative Negative    Fentanyl, Urine Negative Negative    MDMA, Urine Negative Negative    Opiates, Urine Negative Negative    Phencyclidine, Urine Negative Negative    pH, Urine 6.0 3.0 - 11.0    Specific Gravity, Urine Auto 1.012 1.001 - 1.035   EKG 12-lead    Collection Time: 06/11/24  4:25 PM   Result Value Ref Range    QRS Duration 94 ms    [facility] QTC Calculation 416 ms   Hemoglobin A1C    Collection Time: 06/12/24  7:33 AM   Result Value Ref Range    Hemoglobin A1c 5.1 <=7.0 %    Estimated Average Glucose 99.7 mg/dL   Comprehensive Metabolic Panel    Collection Time: 06/12/24  7:33 AM   Result Value Ref Range    Sodium 136 136 - 145 mmol/L    Potassium 4.1 3.5 - 5.1 mmol/L    Chloride 102 98 - 107 mmol/L    CO2 23 22 - 29 mmol/L    Glucose 76 74 - 100 mg/dL    Blood Urea  "Nitrogen 16.3 8.9 - 20.6 mg/dL    Creatinine 0.94 0.73 - 1.18 mg/dL    Calcium 10.3 (H) 8.4 - 10.2 mg/dL    Protein Total 7.9 6.4 - 8.3 gm/dL    Albumin 4.6 3.5 - 5.0 g/dL    Globulin 3.3 2.4 - 3.5 gm/dL    Albumin/Globulin Ratio 1.4 1.1 - 2.0 ratio    Bilirubin Total 2.1 (H) <=1.5 mg/dL    ALP 51 40 - 150 unit/L    ALT 15 0 - 55 unit/L    AST 19 5 - 34 unit/L    eGFR >60 mL/min/1.73/m2    Anion Gap 11.0 mEq/L    BUN/Creatinine Ratio 17    Lipid Panel    Collection Time: 06/12/24  7:33 AM   Result Value Ref Range    Cholesterol Total 165 <=200 mg/dL    HDL Cholesterol 48 35 - 60 mg/dL    Triglyceride 68 34 - 140 mg/dL    Cholesterol/HDL Ratio 3 0 - 5    Very Low Density Lipoprotein 14     LDL Cholesterol 103.00 50.00 - 140.00 mg/dL   CBC with Differential    Collection Time: 06/12/24  7:33 AM   Result Value Ref Range    WBC 8.70 4.50 - 11.50 x10(3)/mcL    RBC 5.48 4.70 - 6.10 x10(6)/mcL    Hgb 16.6 14.0 - 18.0 g/dL    Hct 48.1 42.0 - 52.0 %    MCV 87.8 80.0 - 94.0 fL    MCH 30.3 27.0 - 31.0 pg    MCHC 34.5 33.0 - 36.0 g/dL    RDW 12.4 11.5 - 17.0 %    Platelet 236 130 - 400 x10(3)/mcL    MPV 10.7 (H) 7.4 - 10.4 fL    Neut % 66.5 %    Lymph % 24.0 %    Mono % 8.6 %    Eos % 0.3 %    Basophil % 0.3 %    Lymph # 2.09 0.6 - 4.6 x10(3)/mcL    Neut # 5.77 2.1 - 9.2 x10(3)/mcL    Mono # 0.75 0.1 - 1.3 x10(3)/mcL    Eos # 0.03 0 - 0.9 x10(3)/mcL    Baso # 0.03 <=0.2 x10(3)/mcL    IG# 0.03 0 - 0.04 x10(3)/mcL    IG% 0.3 %    NRBC% 0.0 %      No results found for: "PHENYTOIN", "PHENOBARB", "VALPROATE", "CBMZ"        Psychiatric Mental Status Exam:  General Appearance: appears stated age, well-developed, well-nourished  Arousal: alert  Behavior: somewhat uncooperative  Movements and Motor Activity: no abnormal involuntary movements noted  Orientation: oriented to person, place, time, and situation  Speech: normal rate, normal rhythm, normal volume, normal tone  Mood: "Ok"  Affect: constricted  Thought Process: " linear  Associations: fair  Thought Content and Perceptions: grandiose, hyper-Temple, paranoid, no suicidal ideation, no homicidal ideation  Recent and Remote Memory: recent memory intact, remote memory intact; per interview/observation with patient  Attention and Concentration: intact, attentive to conversation; per interview/observation with patient  Fund of Knowledge: intact, aware of current events, vocabulary appropriate; based on history, vocabulary, fund of knowledge, syntax, grammar, and content  Insight: impaired; based on understanding of severity of illness and HPI  Judgment: impaired; based on patient's behavior and HPI       Patient Strengths:  Access to care, Able to verbalize needs, and Stable employment      Patient Liabilities:  Medication non-compliance and Substance use      Discharge Criteria:  Improved mood, Improved thought process, Medication compliance, Overall functional improvement, and Improved coping skills      Reason for Admission:  The patient is gravely disabled due to a psychiatric condition., The psychiatric disorder requires intensive treatment that necessitates 24 hour observation and care., and The patient can demonstrate a reasonable expectation of improvement in his/her disorder or condition as a result of active treatment being provided.    ASSESSMENT/PLAN:   Diagnoses:  Bipolar Disorder, most recent episode manic, severe, with psychosis (F31.2)  Cannabis use disorder (F12.20)    Past Medical History:   Diagnosis Date    Bipolar disorder           Problem lists and Management Plans:  -Admit to [facility]  -Will attempt to obtain outside psychiatric records if available  - to assist with aftercare planning and collateral  -Continue inpatient treatment as evidenced by significant psychotic thought disorder      Bipolar disorder, chronic with acute exacerbation  -Zyprexa 5mg BID    Cannabis use, chronic with acute exacerbation  -Group/Individual psychotherapy        Estimated length of stay: 5-7 days    Estimated Disposition: Home    Estimated Follow-up: Outpatient medication management      On this date, I have reviewed the medical history and Nursing Assessment, as well as records from referral source.  I have evaluated the mental status of the above named person and concur with the findings of all assessments.  I have provided medical direction for the development of the Treatment Plan.    I conclude that this patient meets admission criteria for inpatient treatment.  I certify that this patient poses a danger to self or others, or would otherwise be considered gravely disabled based on this assessment and/or provided collateral information.     I have provided medical direction for the development of the Treatment plan.  These services will be provided while this patient is under my care and will be based on an individualized plan of care.  The patient can demonstrate a reasonable expectation of improvement in his/her disorder as a result of the active treatment being provided.      Elfego Booth M.D.

## 2024-06-12 NOTE — PROGRESS NOTES
06/12/24 1500   [facility] Group Therapy   Group Name Therapeutic Recreation   Specific Interventions Skilled Activity Leisure Education and Awareness   Participation Level None   Participation Quality Sleeping  (excused)

## 2024-06-12 NOTE — HPI
I here to find God.  I hearing words of God's trust.  Every word I speak is of God.  EVERYTHING. I was having rapid thoughts. Somethimes I can not remember what I say.  I had thoughts of killing my self. I was having thoughts of the past, but living in the future.  I have lot of different thoughts going on all the time.  I have bipolar disorder and not taking my medicine for 6-7 months.

## 2024-06-12 NOTE — NURSING
"Admission Note:    Jurgen Berg is a 28 y.o. male, : 1995, MRN: [id number], admitted on 2024 to [facility] ([facility]) for Elfego Booth MD with a diagnosis of Psychosis [F29]. Patient admitted on a status of Physician Emergency Certificate (PEC). Jurgen reports no known food or drug allergies.    Patient demonstrated an affect that was flat and anxious. Patient demonstrated mood during assessment that was anxious. Patient had an appearance that was clean.  Patient denies suicidal ideation. Patient denies suicide plan. Patient endorses hallucinations.    Jurgen's  height is 5' 5" (1.651 m) and weight is 56.4 kg (124 lb 6.4 oz). His temperature is 97.9 °F (36.6 °C). His blood pressure is 124/67 and his pulse is 68. His respiration is 18.     Blancas last BM was noted on: _______    Metal detector screening performed via security personnel. The result of the scan was _______. Head-to-toe physical assessment completed with the following findings:  ________ found upon body screen. A full skin assessment was performed. Kaveh skin appeared _______.  Jurgen was oriented to unit, staff, peers, and room. Patient belongings/valuables stored in locked intake room cabinet and changes of clothing provided to patient. Jurgen was placed on Q 15 min observations.      "

## 2024-06-12 NOTE — H&P
[facility]  History & Physical    Subjective:      No chief complaint on file.       HPI:  Jurgen Berg is a 28 y.o. male.    I here to find God.  I hearing words of God's trust.  Every word I speak is of God.  EVERYTHING. I was having rapid thoughts. Somethimes I can not remember what I say.  I had thoughts of killing my self. I was having thoughts of the past, but living in the future.  I have lot of different thoughts going on all the time.  I have bipolar disorder and not taking my medicine for 6-7 months.      Past Medical History:   Diagnosis Date    Bipolar disorder      History reviewed. No pertinent surgical history.  Family History   Problem Relation Name Age of Onset    Heart disease Father      Hypertension Father      Diabetes Father       Social History     Tobacco Use    Smoking status: Former     Types: Vaping with nicotine     Start date: 2008   Substance Use Topics    Alcohol use: Not Currently    Drug use: Yes     Types: Marijuana       PTA Medications   Medication Sig    ARIPiprazole (ABILIFY) 20 MG Tab Take 1 tablet (20 mg total) by mouth once daily.     Review of patient's allergies indicates:  No Known Allergies     Review of Systems   Constitutional: Negative.    HENT: Negative.     Respiratory: Negative.     Cardiovascular: Negative.    Gastrointestinal: Negative.    Genitourinary: Negative.    Musculoskeletal: Negative.    Skin: Negative.    Neurological: Negative.    Endo/Heme/Allergies: Negative.    Psychiatric/Behavioral:  Positive for depression, hallucinations (Hear voice of God) and suicidal ideas.        Objective:      Vital Signs (Most Recent)  Temp: 98.1 °F (36.7 °C) (06/12/24 1130)  Pulse: 65 (06/12/24 1130)  Resp: 16 (06/12/24 1130)  BP: 117/77 (06/12/24 1130)  SpO2: 95 % (06/12/24 1130)    Vital Signs Range (Last 24H):  Temp:  [97.5 °F (36.4 °C)-98.1 °F (36.7 °C)]   Pulse:  []   Resp:  [16-20]   BP: (117-145)/(67-90)   SpO2:  [95  %-100 %]     Physical Exam  Constitutional:       General: He is awake.      Appearance: Normal appearance.   HENT:      Head: Normocephalic.      Nose: Nose normal.      Mouth/Throat:      Mouth: Mucous membranes are moist.      Pharynx: Oropharynx is clear.   Eyes:      Extraocular Movements: Extraocular movements intact.      Pupils: Pupils are equal, round, and reactive to light.   Cardiovascular:      Rate and Rhythm: Normal rate and regular rhythm.      Heart sounds: Normal heart sounds.   Pulmonary:      Effort: Pulmonary effort is normal.      Breath sounds: Normal breath sounds.   Abdominal:      General: Abdomen is flat. Bowel sounds are normal.      Palpations: Abdomen is soft.   Musculoskeletal:         General: Normal range of motion.      Cervical back: Normal range of motion and neck supple.   Skin:     General: Skin is warm and dry.   Neurological:      General: No focal deficit present.      Mental Status: He is alert.   Psychiatric:         Attention and Perception: He perceives auditory hallucinations.         Behavior: Behavior is cooperative.         Thought Content: Thought content is delusional. Thought content includes suicidal ideation.         Data Review:    Recent Results (from the past 48 hour(s))   COVID-19 Rapid Screening    Collection Time: 06/11/24  2:44 PM   Result Value Ref Range    SARS COV-2 Molecular Negative Negative   Comprehensive metabolic panel    Collection Time: 06/11/24  3:25 PM   Result Value Ref Range    Sodium 138 136 - 145 mmol/L    Potassium 3.7 3.5 - 5.1 mmol/L    Chloride 106 98 - 107 mmol/L    CO2 24 22 - 29 mmol/L    Glucose 105 (H) 74 - 100 mg/dL    Blood Urea Nitrogen 14.4 8.9 - 20.6 mg/dL    Creatinine 1.02 0.73 - 1.18 mg/dL    Calcium 9.7 8.4 - 10.2 mg/dL    Protein Total 7.6 6.4 - 8.3 gm/dL    Albumin 4.4 3.5 - 5.0 g/dL    Globulin 3.2 2.4 - 3.5 gm/dL    Albumin/Globulin Ratio 1.4 1.1 - 2.0 ratio    Bilirubin Total 1.6 (H) <=1.5 mg/dL    ALP 51 40 - 150  unit/L    ALT 14 0 - 55 unit/L    AST 17 5 - 34 unit/L    eGFR >60 mL/min/1.73/m2    Anion Gap 8.0 mEq/L    BUN/Creatinine Ratio 14    TSH    Collection Time: 06/11/24  3:25 PM   Result Value Ref Range    TSH 2.675 0.350 - 4.940 uIU/mL   Ethanol    Collection Time: 06/11/24  3:25 PM   Result Value Ref Range    Ethanol Level <10.0 <=10.0 mg/dL   CBC with Differential    Collection Time: 06/11/24  3:25 PM   Result Value Ref Range    WBC 8.21 4.50 - 11.50 x10(3)/mcL    RBC 5.13 4.70 - 6.10 x10(6)/mcL    Hgb 15.8 14.0 - 18.0 g/dL    Hct 44.9 42.0 - 52.0 %    MCV 87.5 80.0 - 94.0 fL    MCH 30.8 27.0 - 31.0 pg    MCHC 35.2 33.0 - 36.0 g/dL    RDW 12.1 11.5 - 17.0 %    Platelet 211 130 - 400 x10(3)/mcL    MPV 9.6 7.4 - 10.4 fL    Neut % 84.1 %    Lymph % 10.2 %    Mono % 5.4 %    Eos % 0.0 %    Basophil % 0.1 %    Lymph # 0.84 0.6 - 4.6 x10(3)/mcL    Neut # 6.90 2.1 - 9.2 x10(3)/mcL    Mono # 0.44 0.1 - 1.3 x10(3)/mcL    Eos # 0.00 0 - 0.9 x10(3)/mcL    Baso # 0.01 <=0.2 x10(3)/mcL    IG# 0.02 0 - 0.04 x10(3)/mcL    IG% 0.2 %    NRBC% 0.0 %   Light Blue Top Hold    Collection Time: 06/11/24  3:35 PM   Result Value Ref Range    Extra Tube Hold for add-ons.    Red Top Hold    Collection Time: 06/11/24  3:35 PM   Result Value Ref Range    Extra Tube Hold for add-ons.    Urinalysis, Reflex to Urine Culture    Collection Time: 06/11/24  4:15 PM    Specimen: Urine   Result Value Ref Range    Color, UA Light-Yellow Yellow, Light-Yellow, Dark Yellow, Theresa, Straw    Appearance, UA Clear Clear    Specific Gravity, UA 1.012 1.005 - 1.030    pH, UA 6.0 5.0 - 8.5    Protein, UA Negative Negative    Glucose, UA Normal Negative, Normal    Ketones, UA Trace (A) Negative    Blood, UA Negative Negative    Bilirubin, UA Negative Negative    Urobilinogen, UA Normal 0.2, 1.0, Normal    Nitrites, UA Negative Negative    Leukocyte Esterase, UA Negative Negative    WBC, UA 0-5 None Seen, 0-2, 3-5, 0-5 /HPF    Bacteria, UA None Seen None Seen  /HPF    Squamous Epithelial Cells, UA None Seen None Seen /HPF    Mucous, UA Trace (A) None Seen /LPF    Unclassified Cast, UA 0-2 (A) None Seen /LPF    Hyaline Casts, UA None Seen None Seen /lpf    RBC, UA 0-5 None Seen, 0-2, 3-5, 0-5 /HPF   Drug Screen, Urine    Collection Time: 06/11/24  4:15 PM   Result Value Ref Range    Amphetamines, Urine Negative Negative    Barbiturates, Urine Negative Negative    Benzodiazepine, Urine Positive (A) Negative    Cannabinoids, Urine Positive (A) Negative    Cocaine, Urine Negative Negative    Fentanyl, Urine Negative Negative    MDMA, Urine Negative Negative    Opiates, Urine Negative Negative    Phencyclidine, Urine Negative Negative    pH, Urine 6.0 3.0 - 11.0    Specific Gravity, Urine Auto 1.012 1.001 - 1.035   EKG 12-lead    Collection Time: 06/11/24  4:25 PM   Result Value Ref Range    QRS Duration 94 ms    [facility] QTC Calculation 416 ms   Hemoglobin A1C    Collection Time: 06/12/24  7:33 AM   Result Value Ref Range    Hemoglobin A1c 5.1 <=7.0 %    Estimated Average Glucose 99.7 mg/dL   Comprehensive Metabolic Panel    Collection Time: 06/12/24  7:33 AM   Result Value Ref Range    Sodium 136 136 - 145 mmol/L    Potassium 4.1 3.5 - 5.1 mmol/L    Chloride 102 98 - 107 mmol/L    CO2 23 22 - 29 mmol/L    Glucose 76 74 - 100 mg/dL    Blood Urea Nitrogen 16.3 8.9 - 20.6 mg/dL    Creatinine 0.94 0.73 - 1.18 mg/dL    Calcium 10.3 (H) 8.4 - 10.2 mg/dL    Protein Total 7.9 6.4 - 8.3 gm/dL    Albumin 4.6 3.5 - 5.0 g/dL    Globulin 3.3 2.4 - 3.5 gm/dL    Albumin/Globulin Ratio 1.4 1.1 - 2.0 ratio    Bilirubin Total 2.1 (H) <=1.5 mg/dL    ALP 51 40 - 150 unit/L    ALT 15 0 - 55 unit/L    AST 19 5 - 34 unit/L    eGFR >60 mL/min/1.73/m2    Anion Gap 11.0 mEq/L    BUN/Creatinine Ratio 17    SYPHILIS ANTIBODY (WITH REFLEX RPR)    Collection Time: 06/12/24  7:33 AM   Result Value Ref Range    Syphilis Antibody Nonreactive Nonreactive, Equivocal   Lipid Panel    Collection Time: 06/12/24  7:33  AM   Result Value Ref Range    Cholesterol Total 165 <=200 mg/dL    HDL Cholesterol 48 35 - 60 mg/dL    Triglyceride 68 34 - 140 mg/dL    Cholesterol/HDL Ratio 3 0 - 5    Very Low Density Lipoprotein 14     LDL Cholesterol 103.00 50.00 - 140.00 mg/dL   TSH    Collection Time: 06/12/24  7:33 AM   Result Value Ref Range    TSH 1.684 0.350 - 4.940 uIU/mL   CBC with Differential    Collection Time: 06/12/24  7:33 AM   Result Value Ref Range    WBC 8.70 4.50 - 11.50 x10(3)/mcL    RBC 5.48 4.70 - 6.10 x10(6)/mcL    Hgb 16.6 14.0 - 18.0 g/dL    Hct 48.1 42.0 - 52.0 %    MCV 87.8 80.0 - 94.0 fL    MCH 30.3 27.0 - 31.0 pg    MCHC 34.5 33.0 - 36.0 g/dL    RDW 12.4 11.5 - 17.0 %    Platelet 236 130 - 400 x10(3)/mcL    MPV 10.7 (H) 7.4 - 10.4 fL    Neut % 66.5 %    Lymph % 24.0 %    Mono % 8.6 %    Eos % 0.3 %    Basophil % 0.3 %    Lymph # 2.09 0.6 - 4.6 x10(3)/mcL    Neut # 5.77 2.1 - 9.2 x10(3)/mcL    Mono # 0.75 0.1 - 1.3 x10(3)/mcL    Eos # 0.03 0 - 0.9 x10(3)/mcL    Baso # 0.03 <=0.2 x10(3)/mcL    IG# 0.03 0 - 0.04 x10(3)/mcL    IG% 0.3 %    NRBC% 0.0 %     ECG:   Results for orders placed or performed during the hospital encounter of 06/11/24   EKG 12-lead    Collection Time: 06/11/24  4:25 PM   Result Value Ref Range    QRS Duration 94 ms    [facility] QTC Calculation 416 ms    Narrative    Test Reason : Z00.8,    Vent. Rate : 064 BPM     Atrial Rate : 064 BPM     P-R Int : 144 ms          QRS Dur : 094 ms      QT Int : 404 ms       P-R-T Axes : 000 085 055 degrees     QTc Int : 416 ms    Normal sinus rhythm  Normal ECG  No previous ECGs available    Referred By: [email or web address]   SELF           Confirmed By:       IMAGING: No results found.     Assessment:      Active Hospital Problems    Diagnosis  POA    Marijuana use [F12.90]  Yes    Bipolar I, most recent episode mixed, severe [F31.63]  Yes      Resolved Hospital Problems   No resolved problems to display.       Plan:      Plan per psychiatrist

## 2024-06-13 PROCEDURE — 25000003 PHARM REV CODE 250: Performed by: PSYCHIATRY & NEUROLOGY

## 2024-06-13 PROCEDURE — 11400000 HC PSYCH PRIVATE ROOM

## 2024-06-13 RX ADMIN — OLANZAPINE 5 MG: 5 TABLET, FILM COATED ORAL at 08:06

## 2024-06-13 RX ADMIN — HYDROXYZINE HYDROCHLORIDE 50 MG: 50 TABLET, FILM COATED ORAL at 08:06

## 2024-06-13 RX ADMIN — TRAZODONE HYDROCHLORIDE 100 MG: 100 TABLET ORAL at 08:06

## 2024-06-13 NOTE — PROGRESS NOTES
06/13/24 1000   [facility] Group Therapy   Group Name Therapeutic Recreation   Specific Interventions Skilled Activity Mild Exercises   Participation Level None   Participation Quality Refused  (despite encouragement)   Insight/Motivation None   Affect/Mood Display Elevated;Impulsive   Cognition Unable to Assess   Psychomotor WNL

## 2024-06-13 NOTE — PROGRESS NOTES
06/13/24 1500   [facility] Group Therapy   Group Name Therapeutic Recreation   Specific Interventions Skilled Activity Creative Expression   Participation Level None   Participation Quality Refused  (despite encouragement)

## 2024-06-13 NOTE — PROGRESS NOTES
"6/13/2024  Jurgen Berg   1995   [id number]        Psychiatry Progress Note     Chief Complaint: I found God while I was here    SUBJECTIVE:   Jurgen Berg is a 28 y.o. male placed under a PEC at [facility] after his coworker called [employer] due to patient's rapid speech and erratic.     Patient was elevated and hyper Voodoo today. He was redirectable and polite during this exam. He states that he found God through his father that passed away about three years ago. He states that his mood is great. He is tolerating the medication well without issue. He stated that he does not want to experience the psychosis again and knows now how important his medication is. Will continue with current POC and monitor for need to augment.        Current Medications:   Scheduled Meds:    nicotine  1 patch Transdermal Daily    OLANZapine  5 mg Oral BID      PRN Meds:   Current Facility-Administered Medications:     acetaminophen, 650 mg, Oral, Q6H PRN    aluminum-magnesium hydroxide-simethicone, 30 mL, Oral, Q6H PRN    haloperidoL, 10 mg, Oral, Q4H PRN **AND** diphenhydrAMINE, 50 mg, Oral, Q4H PRN **AND** LORazepam, 2 mg, Oral, Q4H PRN **AND** haloperidol lactate, 10 mg, Intramuscular, Q4H PRN **AND** diphenhydrAMINE, 50 mg, Intramuscular, Q4H PRN **AND** lorazepam, 2 mg, Intramuscular, Q4H PRN    hydrOXYzine HCL, 50 mg, Oral, Q4H PRN    magnesium hydroxide 400 mg/5 ml, 30 mL, Oral, Daily PRN    ondansetron, 4 mg, Oral, Q6H PRN    traZODone, 100 mg, Oral, Nightly PRN   Psychotherapeutics (From admission, onward)      Start     Stop Route Frequency Ordered    06/12/24 0915  OLANZapine tablet 5 mg         -- Oral 2 times daily 06/12/24 0908 06/11/24 1943  traZODone tablet 100 mg         -- Oral Nightly PRN 06/11/24 1943 06/11/24 1942  haloperidoL tablet 10 mg  (Med - Acute  Behavioral Management)        Placed in "And" Linked Group    -- Oral Every 4 hours PRN 06/11/24 1942 06/11/24 1942  LORazepam tablet 2 mg  (Med - Acute  " "Behavioral Management)        Placed in "And" Linked Group    -- Oral Every 4 hours PRN 06/11/24 1942 06/11/24 1942  haloperidol lactate injection 10 mg  (Med - Acute  Behavioral Management)        Placed in "And" Linked Group    -- IM Every 4 hours PRN 06/11/24 1942 06/11/24 1942  LORazepam injection 2 mg  (Med - Acute  Behavioral Management)        Placed in "And" Linked Group    -- IM Every 4 hours PRN 06/11/24 1942            Allergies:   Review of patient's allergies indicates:  No Known Allergies     OBJECTIVE:   Vitals   Vitals:    06/12/24 1915   BP: 129/75   Pulse: 92   Resp:    Temp: 97.6 °F (36.4 °C)        Labs/Imaging/Studies:   Recent Results (from the past 36 hour(s))   Hemoglobin A1C    Collection Time: 06/12/24  7:33 AM   Result Value Ref Range    Hemoglobin A1c 5.1 <=7.0 %    Estimated Average Glucose 99.7 mg/dL   Comprehensive Metabolic Panel    Collection Time: 06/12/24  7:33 AM   Result Value Ref Range    Sodium 136 136 - 145 mmol/L    Potassium 4.1 3.5 - 5.1 mmol/L    Chloride 102 98 - 107 mmol/L    CO2 23 22 - 29 mmol/L    Glucose 76 74 - 100 mg/dL    Blood Urea Nitrogen 16.3 8.9 - 20.6 mg/dL    Creatinine 0.94 0.73 - 1.18 mg/dL    Calcium 10.3 (H) 8.4 - 10.2 mg/dL    Protein Total 7.9 6.4 - 8.3 gm/dL    Albumin 4.6 3.5 - 5.0 g/dL    Globulin 3.3 2.4 - 3.5 gm/dL    Albumin/Globulin Ratio 1.4 1.1 - 2.0 ratio    Bilirubin Total 2.1 (H) <=1.5 mg/dL    ALP 51 40 - 150 unit/L    ALT 15 0 - 55 unit/L    AST 19 5 - 34 unit/L    eGFR >60 mL/min/1.73/m2    Anion Gap 11.0 mEq/L    BUN/Creatinine Ratio 17    SYPHILIS ANTIBODY (WITH REFLEX RPR)    Collection Time: 06/12/24  7:33 AM   Result Value Ref Range    Syphilis Antibody Nonreactive Nonreactive, Equivocal   Lipid Panel    Collection Time: 06/12/24  7:33 AM   Result Value Ref Range    Cholesterol Total 165 <=200 mg/dL    HDL Cholesterol 48 35 - 60 mg/dL    Triglyceride 68 34 - 140 mg/dL    Cholesterol/HDL Ratio 3 0 - 5    Very Low Density " "Lipoprotein 14     LDL Cholesterol 103.00 50.00 - 140.00 mg/dL   TSH    Collection Time: 06/12/24  7:33 AM   Result Value Ref Range    TSH 1.684 0.350 - 4.940 uIU/mL   CBC with Differential    Collection Time: 06/12/24  7:33 AM   Result Value Ref Range    WBC 8.70 4.50 - 11.50 x10(3)/mcL    RBC 5.48 4.70 - 6.10 x10(6)/mcL    Hgb 16.6 14.0 - 18.0 g/dL    Hct 48.1 42.0 - 52.0 %    MCV 87.8 80.0 - 94.0 fL    MCH 30.3 27.0 - 31.0 pg    MCHC 34.5 33.0 - 36.0 g/dL    RDW 12.4 11.5 - 17.0 %    Platelet 236 130 - 400 x10(3)/mcL    MPV 10.7 (H) 7.4 - 10.4 fL    Neut % 66.5 %    Lymph % 24.0 %    Mono % 8.6 %    Eos % 0.3 %    Basophil % 0.3 %    Lymph # 2.09 0.6 - 4.6 x10(3)/mcL    Neut # 5.77 2.1 - 9.2 x10(3)/mcL    Mono # 0.75 0.1 - 1.3 x10(3)/mcL    Eos # 0.03 0 - 0.9 x10(3)/mcL    Baso # 0.03 <=0.2 x10(3)/mcL    IG# 0.03 0 - 0.04 x10(3)/mcL    IG% 0.3 %    NRBC% 0.0 %          Medical Review Of Systems:  A comprehensive review of systems was negative.      Psychiatric Mental Status Exam:  General Appearance: appears stated age, well-developed, well-nourished  Arousal: alert  Behavior: somewhat uncooperative  Movements and Motor Activity: no abnormal involuntary movements noted  Orientation: oriented to person, place, time, and situation  Speech: normal rate, normal rhythm, normal volume, normal tone  Mood: "Ok"  Affect: constricted  Thought Process: linear  Associations: fair  Thought Content and Perceptions: grandiose, hyper-Evangelical, paranoid, no suicidal ideation, no homicidal ideation  Recent and Remote Memory: recent memory intact, remote memory intact; per interview/observation with patient  Attention and Concentration: intact, attentive to conversation; per interview/observation with patient  Fund of Knowledge: intact, aware of current events, vocabulary appropriate; based on history, vocabulary, fund of knowledge, syntax, grammar, and content  Insight: impaired; based on understanding of severity of illness and " HPI  Judgment: impaired; based on patient's behavior and HPI    ASSESSMENT/PLAN:   Problems Addressed/Diagnoses:  Bipolar Disorder, most recent episode manic, severe, with psychosis (F31.2)  Cannabis use disorder (F12.20)    Past Medical History:   Diagnosis Date    Bipolar disorder         Plan:  Bipolar disorder, chronic with acute exacerbation  -Zyprexa 5mg BID     Cannabis use, chronic with acute exacerbation  -Group/Individual psychotherapy     Expected Disposition Plan: Home        Marco Antonio Claudio [id number]

## 2024-06-13 NOTE — PLAN OF CARE
Problem: Adult Behavioral Health Plan of Care  Goal: Plan of Care Review  Outcome: Progressing  Flowsheets (Taken 6/13/2024 1109)  Patient Agreement with Plan of Care: agrees  Plan of Care Reviewed With: patient  Goal: Patient-Specific Goal (Individualization)  Outcome: Progressing  Flowsheets (Taken 6/13/2024 1109)  Patient Personal Strengths: medication/treatment adherence  Patient Vulnerabilities: limited support system  Goal: Adheres to Safety Considerations for Self and Others  Outcome: Progressing  Flowsheets (Taken 6/13/2024 1109)  Adheres to Safety Considerations for Self and Others: making progress toward outcome  Intervention: Develop and Maintain Individualized Safety Plan  Flowsheets (Taken 6/13/2024 1109)  Safety Measures: safety rounds completed  Goal: Absence of New-Onset Illness or Injury  Outcome: Progressing  Intervention: Identify and Manage Fall Risk  Flowsheets (Taken 6/13/2024 1109)  Safety Measures: safety rounds completed  Intervention: Prevent VTE (Venous Thromboembolism)  Flowsheets (Taken 6/13/2024 1109)  VTE Prevention/Management: fluids promoted  Intervention: Prevent Infection  Flowsheets (Taken 6/13/2024 1109)  Infection Prevention: hand hygiene promoted  Goal: Optimized Coping Skills in Response to Life Stressors  Outcome: Progressing  Flowsheets (Taken 6/13/2024 1109)  Optimized Coping Skills in Response to Life Stressors: making progress toward outcome  Intervention: Promote Effective Coping Strategies  Flowsheets (Taken 6/13/2024 1109)  Supportive Measures: self-care encouraged  Goal: Develops/Participates in Therapeutic [place] to Support Successful Transition  Outcome: Progressing  Flowsheets (Taken 6/13/2024 1109)  Develops/Participates in Therapeutic [place] to Support Successful Transition: making progress toward outcome  Intervention: Foster Therapeutic [place]  Flowsheets (Taken 6/13/2024 1109)  Trust Relationship/Rapport: care explained  Goal: Rounds/Family  Conference  Outcome: Progressing  Flowsheets (Taken 6/13/2024 1109)  Participants:   milieu/psych techs   nursing     Problem: Violence Risk or Actual  Goal: Anger and Impulse Control  Outcome: Progressing  Intervention: Minimize Safety Risk  Flowsheets (Taken 6/13/2024 1109)  Behavior Management: behavioral plan reviewed  Sensory Stimulation Regulation: quiet environment promoted  De-Escalation Techniques: quiet time facilitated  Intervention: Promote Self-Control  Flowsheets (Taken 6/13/2024 1109)  Supportive Measures: self-care encouraged  Environmental Support: calm environment promoted     Problem: Psychotic Signs/Symptoms  Goal: Improved Behavioral Control (Psychotic Signs/Symptoms)  Outcome: Progressing  Flowsheets (Taken 6/13/2024 1109)  Mutually Determined Action Steps (Improved Behavioral Control): identifies symptoms triggers  Intervention: Manage Behavior  Flowsheets (Taken 6/13/2024 1109)  De-Escalation Techniques: quiet time facilitated  Goal: Optimal Cognitive Function (Psychotic Signs/Symptoms)  Outcome: Progressing  Flowsheets (Taken 6/13/2024 1109)  Mutually Determined Action Steps (Optimal Cognitive Function): participates in attention training  Intervention: Support and Promote Cognitive Ability  Flowsheets (Taken 6/13/2024 1109)  Trust Relationship/Rapport: care explained  Goal: Increased Participation and Engagement (Psychotic Signs/Symptoms)  Outcome: Progressing  Flowsheets (Taken 6/13/2024 1109)  Mutually Determined Action Steps (Increased Participation and Engagement): identifies symptoms triggers  Intervention: Facilitate Participation and Engagement  Flowsheets (Taken 6/13/2024 1109)  Supportive Measures: self-care encouraged  Diversional Activity: television  Goal: Improved Mood Symptoms (Psychotic Signs/Symptoms)  Outcome: Progressing  Flowsheets (Taken 6/13/2024 1109)  Mutually Determined Action Steps (Improved Mood Symptoms): acknowledges progress  Intervention: Optimize Emotion and  Mood  Flowsheets (Taken 6/13/2024 1109)  Supportive Measures: self-care encouraged  Diversional Activity: television  Goal: Improved Psychomotor Symptoms (Psychotic Signs/Symptoms)  Outcome: Progressing  Flowsheets (Taken 6/13/2024 1109)  Mutually Determined Action Steps (Improved Psychomotor Symptoms): adheres to medication regimen  Intervention: Manage Psychomotor Movement  Flowsheets (Taken 6/13/2024 1109)  Activity (Behavioral Health): up ad anisa  Patient Performed Hygiene: teeth brushed  Diversional Activity: television  Goal: Decreased Sensory Symptoms (Psychotic Signs/Symptoms)  Outcome: Progressing  Flowsheets (Taken 6/13/2024 1109)  Mutually Determined Action Steps (Decreased Sensory Symptoms): adheres to medication regimen  Intervention: Minimize and Manage Sensory Impairment  Flowsheets (Taken 6/13/2024 1109)  Sensory Stimulation Regulation: quiet environment promoted  Goal: Improved Sleep (Psychotic Signs/Symptoms)  Outcome: Progressing  Flowsheets (Taken 6/13/2024 1109)  Mutually Determined Action Steps (Improved Sleep): sleeps 4-6 hours at night  Intervention: Promote Healthy Sleep Hygiene  Flowsheets (Taken 6/13/2024 1109)  Sleep Hygiene Promotion: regular sleep pattern promoted  Goal: Enhanced Social, Occupational or Functional Skills (Psychotic Signs/Symptoms)  Outcome: Progressing  Flowsheets (Taken 6/13/2024 1109)  Mutually Determined Action Steps (Enhanced Social, Occupational or Functional Skills): participates in social skills training  Intervention: Promote Social, Occupational and Functional Ability  Flowsheets (Taken 6/13/2024 1109)  Trust Relationship/Rapport: care explained  Social Functional Ability Promotion: autonomy promoted    He is AAO X 4. Euphoric this AM. Religiously preoccupied. Denies SI, HI, and hallucinations. Good eye contact noted. Hyper-verbal. Interacts with selected patients and staff. Medication compliant. Continue plan of care and provide a safe and therapeutic  environment. Continue to monitor every fifteen minutes for safety.

## 2024-06-13 NOTE — PLAN OF CARE
Problem: Adult Behavioral Health Plan of Care  Goal: Plan of Care Review  6/12/2024 2103 by Allison Darling RN  Outcome: Progressing  Flowsheets (Taken 6/12/2024 2103)  Patient Agreement with Plan of Care: agrees  Plan of Care Reviewed With: patient  6/12/2024 2013 by Allison Darling RN  Outcome: Progressing  Flowsheets (Taken 6/12/2024 2013)  Patient Agreement with Plan of Care: agrees  Plan of Care Reviewed With: patient  Goal: Patient-Specific Goal (Individualization)  6/12/2024 2103 by Allison Darling RN  Outcome: Progressing  Flowsheets (Taken 6/12/2024 2103)  Patient Personal Strengths: medication/treatment adherence  Patient Vulnerabilities: limited support system  6/12/2024 2013 by Allison Darling RN  Outcome: Progressing  Flowsheets (Taken 6/12/2024 2013)  Patient Personal Strengths: medication/treatment adherence  Patient Vulnerabilities: limited support system  Goal: Adheres to Safety Considerations for Self and Others  6/12/2024 2103 by Allison Darling RN  Outcome: Progressing  Flowsheets (Taken 6/12/2024 2103)  Adheres to Safety Considerations for Self and Others: making progress toward outcome  6/12/2024 2013 by Allison Darling RN  Outcome: Progressing  Flowsheets (Taken 6/12/2024 2013)  Adheres to Safety Considerations for Self and Others: making progress toward outcome  Intervention: Develop and Maintain Individualized Safety Plan  6/12/2024 2103 by Allison Darling RN  [place] (Taken 6/12/2024 2103)  Safety Measures: monitored by video  6/12/2024 2013 by Allison Darling RN  Flowsheets (Taken 6/12/2024 2013)  Safety Measures: monitored by video  Goal: Absence of New-Onset Illness or Injury  6/12/2024 2103 by Allison Darling RN  Outcome: Progressing  6/12/2024 2013 by Allison Darling RN  Outcome: Progressing  Goal: Optimized Coping Skills in Response to Life Stressors  6/12/2024 2103 by Allison Darling RN  Outcome: Progressing  Flowsheets (Taken 6/12/2024 2103)  Optimized Coping Skills in Response  to Life Stressors: making progress toward outcome  6/12/2024 2013 by Allison Darling RN  Outcome: Progressing  Goal: Develops/Participates in Therapeutic [place] to Support Successful Transition  6/12/2024 2103 by Allison Darling RN  Outcome: Progressing  6/12/2024 2013 by Allison Darling RN  Outcome: Progressing  Goal: Rounds/Family Conference  6/12/2024 2103 by Allison Darling RN  Outcome: Progressing  6/12/2024 2013 by Allisno Darling RN  Outcome: Progressing     Problem: Violence Risk or Actual  Goal: Anger and Impulse Control  6/12/2024 2103 by Allison Darling RN  Outcome: Progressing  6/12/2024 2013 by Allison Darling RN  Outcome: Progressing     Problem: Psychotic Signs/Symptoms  Goal: Improved Behavioral Control (Psychotic Signs/Symptoms)  6/12/2024 2103 by Allison Darling RN  Outcome: Progressing  Flowsheets (Taken 6/12/2024 2103)  Mutually Determined Action Steps (Improved Behavioral Control): identifies symptoms triggers  6/12/2024 2013 by Allison Darling RN  Outcome: Progressing  Goal: Optimal Cognitive Function (Psychotic Signs/Symptoms)  6/12/2024 2103 by Allison Darling RN  Outcome: Progressing  Flowsheets (Taken 6/12/2024 2103)  Mutually Determined Action Steps (Optimal Cognitive Function): remains focused during activity  6/12/2024 2013 by Allison Darling RN  Outcome: Progressing  Intervention: Support and Promote Cognitive Ability  Flowsheets (Taken 6/12/2024 2103)  Trust Relationship/Rapport: care explained  Goal: Increased Participation and Engagement (Psychotic Signs/Symptoms)  6/12/2024 2103 by Allison Darling RN  Outcome: Progressing  Flowsheets (Taken 6/12/2024 2103)  Mutually Determined Action Steps (Increased Participation and Engagement): identifies symptoms triggers  6/12/2024 2013 by Allison Darling RN  Outcome: Progressing  Goal: Improved Mood Symptoms (Psychotic Signs/Symptoms)  6/12/2024 2103 by Allison Darling RN  Outcome: Progressing  6/12/2024 2013 by Allison Darling RN  Outcome:  Progressing  Goal: Improved Psychomotor Symptoms (Psychotic Signs/Symptoms)  6/12/2024 2103 by Allison Darling RN  Outcome: Progressing  6/12/2024 2013 by Allison Darling RN  Outcome: Progressing  Goal: Decreased Sensory Symptoms (Psychotic Signs/Symptoms)  6/12/2024 2103 by Allison Darling RN  Outcome: Progressing  6/12/2024 2013 by Allison Darling RN  Outcome: Progressing  Goal: Improved Sleep (Psychotic Signs/Symptoms)  6/12/2024 2103 by Allison Darling RN  Outcome: Progressing  6/12/2024 2013 by Allison Darling RN  Outcome: Progressing  Goal: Enhanced Social, Occupational or Functional Skills (Psychotic Signs/Symptoms)  6/12/2024 2103 by Allison Darling RN  Outcome: Progressing  6/12/2024 2013 by Allison Darling RN  Outcome: Progressing  Remains anxious and flat.  A/V hallucinations noted.  Compliant with meds and cooperative with staff.

## 2024-06-13 NOTE — GROUP NOTE
Group Psychotherapy       Group Focus: Promoting Healthy Lifestyles     Group topic: Discharge Planning. SW explored patients need to identifying personal strengths and qualities in working towards mental health goals.      Number of patients in attendance: 3    Group Start Time: 1045  Group End Time:  1130  Groups Date: 6/13/2024  Group Topic:  Behavioral Health  Group Department: [facility] Behavioral Health Unit  Group Facilitators:  Maliha Jacobs MSW  _____________________________________________________________________    Patient Name: Jurgen Berg  MRN: [id number]  Patient Class: IP- Psych   Admission Date\Time: 6/11/2024  7:30 PM  Hospital Length of Stay: 2  Primary Care Provider: Qiana, Primary Doctor     Referred by: Acute Psychiatry Unit Treatment Team     Target symptoms: Psychosis     Patient's response to treatment: Not Participating; Pt was not present in group session; alternate provided.      Progress toward goals: Progressing slowly     Interval History:      Diagnosis:      Plan: Continue treatment on APU

## 2024-06-14 PROCEDURE — 11400000 HC PSYCH PRIVATE ROOM

## 2024-06-14 PROCEDURE — 25000003 PHARM REV CODE 250: Performed by: PSYCHIATRY & NEUROLOGY

## 2024-06-14 RX ADMIN — TRAZODONE HYDROCHLORIDE 100 MG: 100 TABLET ORAL at 08:06

## 2024-06-14 RX ADMIN — OLANZAPINE 5 MG: 5 TABLET, FILM COATED ORAL at 08:06

## 2024-06-14 NOTE — PLAN OF CARE
Problem: Adult Behavioral Health Plan of Care  Goal: Plan of Care Review  Outcome: Progressing  Flowsheets (Taken 6/14/2024 0013)  Patient Agreement with Plan of Care: agrees  Plan of Care Reviewed With: patient  Goal: Patient-Specific Goal (Individualization)  Outcome: Progressing  Flowsheets (Taken 6/14/2024 0013)  Patient Personal Strengths: no history of violence  Patient Vulnerabilities:   substance abuse/addiction   lacks insight into illness  Goal: Adheres to Safety Considerations for Self and Others  Outcome: Progressing  Flowsheets (Taken 6/14/2024 0013)  Adheres to Safety Considerations for Self and Others: making progress toward outcome  Intervention: Develop and Maintain Individualized Safety Plan  Flowsheets (Taken 6/14/2024 0013)  Safety Measures:   monitored by video   safety rounds completed   suicide assessment completed  Goal: Absence of New-Onset Illness or Injury  Outcome: Progressing  Intervention: Identify and Manage Fall Risk  Flowsheets (Taken 6/14/2024 0013)  Safety Measures:   monitored by video   safety rounds completed   suicide assessment completed  Intervention: Prevent VTE (Venous Thromboembolism)  Flowsheets (Taken 6/14/2024 0013)  VTE Prevention/Management:   ambulation promoted   fluids promoted  Intervention: Prevent Infection  Flowsheets (Taken 6/14/2024 0013)  Infection Prevention:   rest/sleep promoted   hand hygiene promoted  Goal: Optimized Coping Skills in Response to Life Stressors  Outcome: Progressing  Flowsheets (Taken 6/14/2024 0013)  Optimized Coping Skills in Response to Life Stressors: making progress toward outcome  Intervention: Promote Effective Coping Strategies  Flowsheets (Taken 6/14/2024 0013)  Supportive Measures:   active listening utilized   verbalization of feelings encouraged  Goal: Develops/Participates in Therapeutic [place] to Support Successful Transition  Outcome: Progressing  Flowsheets (Taken 6/14/2024 0013)  Develops/Participates in Therapeutic  [place] to Support Successful Transition: making progress toward outcome  Intervention: Foster Therapeutic [place]  Flowsheets (Taken 6/14/2024 0013)  Trust Relationship/Rapport:   care explained   thoughts/feelings acknowledged  Intervention: Mutually Develop Transition Plan  Flowsheets (Taken 6/14/2024 0013)  Current Discharge Risk:   psychiatric illness   substance use/abuse  Outpatient/Agency/Support Group Needs:   outpatient counseling   outpatient medication management  Anticipated Discharge Disposition: home with family  Patient/Family Anticipated Services at Transition:   outpatient care   mental health services  Patient/Family Anticipates Transition to: home with family  Concerns to be Addressed:   medication   mental health  Goal: Rounds/Family Conference  Outcome: Progressing  Flowsheets (Taken 6/14/2024 0013)  Participants:   milieu/psych techs   nursing     Problem: Violence Risk or Actual  Goal: Anger and Impulse Control  Outcome: Progressing  Intervention: Minimize Safety Risk  Flowsheets (Taken 6/14/2024 0013)  Sensory Stimulation Regulation: quiet environment promoted  De-Escalation Techniques: quiet time facilitated  Enhanced Safety Measures: monitored by video  Intervention: Promote Self-Control  Flowsheets (Taken 6/14/2024 0013)  Supportive Measures:   active listening utilized   verbalization of feelings encouraged  Environmental Support:   calm environment promoted   rest periods encouraged     Problem: Psychotic Signs/Symptoms  Goal: Improved Behavioral Control (Psychotic Signs/Symptoms)  Outcome: Progressing  Intervention: Manage Behavior  Flowsheets (Taken 6/14/2024 0013)  De-Escalation Techniques: quiet time facilitated  Goal: Optimal Cognitive Function (Psychotic Signs/Symptoms)  Outcome: Progressing  Intervention: Support and Promote Cognitive Ability  Flowsheets (Taken 6/14/2024 0013)  Trust Relationship/Rapport:   care explained   thoughts/feelings acknowledged  Goal: Increased  Participation and Engagement (Psychotic Signs/Symptoms)  Outcome: Progressing  Intervention: Facilitate Participation and Engagement  Flowsheets (Taken 6/14/2024 0013)  Supportive Measures:   active listening utilized   verbalization of feelings encouraged  Diversional Activity: television  Goal: Improved Mood Symptoms (Psychotic Signs/Symptoms)  Outcome: Progressing  Flowsheets (Taken 6/14/2024 0013)  Mutually Determined Action Steps (Improved Mood Symptoms): acknowledges progress  Intervention: Optimize Emotion and Mood  Flowsheets (Taken 6/14/2024 0013)  Supportive Measures:   active listening utilized   verbalization of feelings encouraged  Diversional Activity: television  Goal: Improved Psychomotor Symptoms (Psychotic Signs/Symptoms)  Outcome: Progressing  Flowsheets (Taken 6/14/2024 0013)  Mutually Determined Action Steps (Improved Psychomotor Symptoms): adheres to medication regimen  Intervention: Manage Psychomotor Movement  Flowsheets (Taken 6/14/2024 0013)  Activity (Behavioral Health): up ad anisa  Diversional Activity: television  Goal: Decreased Sensory Symptoms (Psychotic Signs/Symptoms)  Outcome: Progressing  Flowsheets (Taken 6/14/2024 0013)  Mutually Determined Action Steps (Decreased Sensory Symptoms): adheres to medication regimen  Intervention: Minimize and Manage Sensory Impairment  Flowsheets (Taken 6/14/2024 0013)  Sensory Stimulation Regulation: quiet environment promoted  Goal: Improved Sleep (Psychotic Signs/Symptoms)  Outcome: Progressing  Flowsheets (Taken 6/14/2024 0013)  Mutually Determined Action Steps (Improved Sleep): sleeps 4-6 hours at night  Intervention: Promote Healthy Sleep Hygiene  Flowsheets (Taken 6/14/2024 0013)  Sleep Hygiene Promotion:   awakenings minimized   regular sleep pattern promoted  Goal: Enhanced Social, Occupational or Functional Skills (Psychotic Signs/Symptoms)  Outcome: Progressing  Intervention: Promote Social, Occupational and Functional Ability  Flowsheets  "(Taken 6/14/2024 0013)  Trust Relationship/Rapport:   care explained   thoughts/feelings acknowledged  Social Functional Ability Promotion: social interaction promoted   AAOx4. Flat affect. Anxious and depressed mood. Pacing in the hallway. Reports auditory hallucinations of "The word of GOD." Denies suicidal or homicidal ideations. Compliant with medications.  Reports that medications are helping. Pt states that he is eating better and sleeping well. Refused to attend group. No agitation or aggression noted. Continue with plan of care and q 15 minutes safety checks.   "

## 2024-06-14 NOTE — PLAN OF CARE
"Behavioral Health Unit  Psychosocial History and Assessment  Progress Note      Patient Name: Jurgen Berg YOB: 1995 SW: YOSEPH Fuentes,[facility] Date: 6/14/2024    Chief Complaint: psychosis    Consent:     Did the patient consent for an interview with the ? Yes    Did the patient consent for the  to contact family/friend/caregiver?   Yes  Name: Torrie Berg, Relationship: sister, and Contact: [phone]    Did the patient give consent for the  to inform family/friend/caregiver of his/her whereabouts or to discuss discharge planning? Yes    Source of Information: Face to face with patient and Chart review    Is information obtained from interviews considered reliable?   yes    Reason for Admission:     Active Hospital Problems    Diagnosis  POA    Marijuana use [F12.90]  Yes    Bipolar I, most recent episode mixed, severe [F31.63]  Yes      Resolved Hospital Problems   No resolved problems to display.       History of Present Illness - (Patient Perception):   "I found God and God is here to protect me. I believe in him now."      Present biopsychosocial functioning: paranoia, delusional, tangential, disorganized thought process      Past biopsychosocial functioning: Pt has hx of psychosis    Family and Marital/Relationship History:     Significant Other/Partner Relationships:  Single:  no children     Family Relationships: Strained      Childhood History:     Where was patient raised? [place], [place]     Who raised the patient? Mother       How does patient describe their childhood? Pt stated it was terrible but declined to say why.       Who is patient's primary support person? Torrie, sister       Culture and Buddhism:     Buddhism: Yarsani    How strong of a role does Lutheran and spirituality play in patient's life? Severe     Samaritan or spiritual concerns regarding treatment: observation of holy days  and spiritual concerns / distress    History " of Abuse:   History of Abuse: Denies      Outcome: n/a    Psychiatric and Medical History:     History of psychiatric illness or treatment: prior inpatient treatment and has participated in counseling/psychotherapy on an outpatient basis in the past    Medical history:   Past Medical History:   Diagnosis Date    Bipolar disorder        Substance Abuse History:     Alcohol - (Patient Perspective): Pt denied use.   Social History     Substance and Sexual Activity   Alcohol Use Not Currently         Drugs - (Patient Perspective): Pt reported thc use. UDS is positive for benzo and thc.   Social History     Substance and Sexual Activity   Drug Use Yes    Types: Marijuana       Education:     Currently Enrolled? No  Associate/Bachelor Degree; Associate degree in [employer]     Special Education? No    Interested in Completing Education/GED: No    Employment and Financial:     Currently employed? 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 Yes    Who manages finances/personal affairs? Self/ family      Service:     ? no    Combat Service? No     Community Resources:     Describe present use of community resources: inpatient mh     Identify previously used community resources   (Include previous mental health treatment - outpatient and inpatient): inpatient mh, outpatient mh     Environmental:     Current living situation:Lives with family    Social Evaluation:     Patient Assets: Supportive family/friends    Patient Limitations: poor coping skills, limited insight     High risk psychosocial issues that may impact discharge planning:   None noted at this time     Recommendations:     Anticipated discharge plan:   Home- [address]  Outpatient follow-up     High risk issues requiring early treatment planning and immediate intervention: none noted at this time     Community resources  needed for discharge planning:  aftercare treatment sources    Anticipated social work role(s) in treatment and discharge planning: Sw will  and offer advice along with group therapy, ind as necessary and dc planning.    06/14/24 3400   Initial Information   Source of Information patient   Reason for Admission psychosis   Patient Aware of Diagnosis yes   Arrived From emergency department   Current or Previous  Service none   Legal Status    Type of Admission Involuntary   Type of Involuntary Admission PEC   Spiritual Beliefs   Spiritual, Cultural Beliefs, Tenriism Practices, Values that Affect Care yes   Description of Beliefs that Will Affect Care Restoration   Substance Use/Withdrawal   Substance Use Current, used prior to admission   Additional Tobacco Use   How many cigarettes do you typically have per day? 0   What additional types of tobacco do you currently use? Other (see comments)  (vape)   Abuse Screen (yes response referral indicated)   Feels Unsafe at Home or Work/School no   Feels Threatened by Someone no   Does anyone try to keep you from having contact with others or doing things outside your home? no   Physical Signs of Abuse Present no   Abuse Details   Physical Abuse No   Sexual Abuse No   Emotional Abuse No   AUDIT-C (Alcohol Use Disorders ID Test)   Alcohol Use In Past Year 0-->never   Alcohol Amount Per Day In Past Year 0-->none   More Than 6 Drinks On One Occasion In Past Year 0-->never   Total Audit C Score 0

## 2024-06-14 NOTE — PLAN OF CARE
Problem: Adult Behavioral Health Plan of Care  Goal: Plan of Care Review  Outcome: Progressing  Flowsheets (Taken 6/14/2024 0930)  Patient Agreement with Plan of Care: agrees  Plan of Care Reviewed With: patient  Goal: Patient-Specific Goal (Individualization)  Outcome: Progressing  Flowsheets (Taken 6/14/2024 0930)  Patient Personal Strengths: independent living skills  Patient Vulnerabilities: limited social skills  Goal: Adheres to Safety Considerations for Self and Others  Outcome: Progressing  Flowsheets (Taken 6/14/2024 0930)  Adheres to Safety Considerations for Self and Others: making progress toward outcome  Intervention: Develop and Maintain Individualized Safety Plan  Flowsheets (Taken 6/14/2024 0930)  Safety Measures: safety rounds completed  Goal: Absence of New-Onset Illness or Injury  Outcome: Progressing  Intervention: Identify and Manage Fall Risk  Flowsheets (Taken 6/14/2024 0930)  Safety Measures: safety rounds completed  Intervention: Prevent VTE (Venous Thromboembolism)  Flowsheets (Taken 6/14/2024 0930)  VTE Prevention/Management: fluids promoted  Intervention: Prevent Infection  Flowsheets (Taken 6/14/2024 0930)  Infection Prevention: hand hygiene promoted  Goal: Optimized Coping Skills in Response to Life Stressors  Outcome: Progressing  Flowsheets (Taken 6/14/2024 0930)  Optimized Coping Skills in Response to Life Stressors: making progress toward outcome  Intervention: Promote Effective Coping Strategies  Flowsheets (Taken 6/14/2024 0930)  Supportive Measures: self-care encouraged  Goal: Develops/Participates in Therapeutic [place] to Support Successful Transition  Outcome: Progressing  Flowsheets (Taken 6/14/2024 0930)  Develops/Participates in Therapeutic [place] to Support Successful Transition: making progress toward outcome  Intervention: Foster Therapeutic [place]  Flowsheets (Taken 6/14/2024 0930)  Trust Relationship/Rapport: care explained  Goal: Rounds/Family  Conference  Outcome: Progressing  Flowsheets (Taken 6/14/2024 0930)  Participants:   nursing   milieu/psych techs     Problem: Violence Risk or Actual  Goal: Anger and Impulse Control  Outcome: Progressing  Intervention: Minimize Safety Risk  Flowsheets (Taken 6/14/2024 0930)  Behavior Management: behavioral plan reviewed  Sensory Stimulation Regulation: quiet environment promoted  De-Escalation Techniques: quiet time facilitated  Intervention: Promote Self-Control  Flowsheets (Taken 6/14/2024 0930)  Supportive Measures: self-care encouraged  Environmental Support: calm environment promoted     Problem: Psychotic Signs/Symptoms  Goal: Improved Behavioral Control (Psychotic Signs/Symptoms)  Outcome: Progressing  Flowsheets (Taken 6/14/2024 0930)  Mutually Determined Action Steps (Improved Behavioral Control): identifies symptoms triggers  Intervention: Manage Behavior  Flowsheets (Taken 6/14/2024 0930)  De-Escalation Techniques: quiet time facilitated  Goal: Optimal Cognitive Function (Psychotic Signs/Symptoms)  Outcome: Progressing  Flowsheets (Taken 6/14/2024 0930)  Mutually Determined Action Steps (Optimal Cognitive Function): participates in attention training  Intervention: Support and Promote Cognitive Ability  Flowsheets (Taken 6/14/2024 0930)  Trust Relationship/Rapport: care explained  Goal: Increased Participation and Engagement (Psychotic Signs/Symptoms)  Outcome: Progressing  Flowsheets (Taken 6/14/2024 0930)  Mutually Determined Action Steps (Increased Participation and Engagement): identifies symptoms triggers  Intervention: Facilitate Participation and Engagement  Flowsheets (Taken 6/14/2024 0930)  Supportive Measures: self-care encouraged  Diversional Activity: television  Goal: Improved Mood Symptoms (Psychotic Signs/Symptoms)  Outcome: Progressing  Flowsheets (Taken 6/14/2024 0930)  Mutually Determined Action Steps (Improved Mood Symptoms): acknowledges progress  Intervention: Optimize Emotion and  Mood  Flowsheets (Taken 6/14/2024 0930)  Supportive Measures: self-care encouraged  Diversional Activity: television  Goal: Improved Psychomotor Symptoms (Psychotic Signs/Symptoms)  Outcome: Progressing  Flowsheets (Taken 6/14/2024 0930)  Mutually Determined Action Steps (Improved Psychomotor Symptoms): adheres to medication regimen  Intervention: Manage Psychomotor Movement  Flowsheets (Taken 6/14/2024 0930)  Activity (Behavioral Health): up ad anisa  Patient Performed Hygiene: teeth brushed  Diversional Activity: television  Goal: Decreased Sensory Symptoms (Psychotic Signs/Symptoms)  Outcome: Progressing  Flowsheets (Taken 6/14/2024 0930)  Mutually Determined Action Steps (Decreased Sensory Symptoms): adheres to medication regimen  Intervention: Minimize and Manage Sensory Impairment  Flowsheets (Taken 6/14/2024 0930)  Sensory Stimulation Regulation: quiet environment promoted  Goal: Improved Sleep (Psychotic Signs/Symptoms)  Outcome: Progressing  Flowsheets (Taken 6/14/2024 0930)  Mutually Determined Action Steps (Improved Sleep): sleeps 4-6 hours at night  Intervention: Promote Healthy Sleep Hygiene  Flowsheets (Taken 6/14/2024 0930)  Sleep Hygiene Promotion: regular sleep pattern promoted  Goal: Enhanced Social, Occupational or Functional Skills (Psychotic Signs/Symptoms)  Outcome: Progressing  Flowsheets (Taken 6/14/2024 0930)  Mutually Determined Action Steps (Enhanced Social, Occupational or Functional Skills): participates in social skills training  Intervention: Promote Social, Occupational and Functional Ability  Flowsheets (Taken 6/14/2024 0930)  Trust Relationship/Rapport: care explained  Social Functional Ability Promotion: social interaction promoted    He is AAO X 4. Manic behavior noted AEB he is observed to be pacing in the zendejas and RTIS. Auditory hallucinations AEB he states that God is speaking to him. Religiously preoccupied. Paranoia noted towards other patients. Guarded. Suspicious. Interacts  with selected patients and staff. Good eye contact noted. Speech is normal tone, but rambling. Continue plan of care and provide a safe and therapeutic environment. Continue to monitor every fifteen minutes for safety.

## 2024-06-14 NOTE — PROGRESS NOTES
06/14/24 1400   [facility] Group Therapy   Group Name Therapeutic Recreation   Specific Interventions Skilled Activity Leisure Education and Awareness   Participation Level Active;Supportive;Appropriate;Attentive;Sharing   Participation Quality Cooperative;Social   Insight/Motivation Limited   Affect/Mood Display Impulsive;Elevated   Cognition Pre-Occupied   Psychomotor WNL

## 2024-06-14 NOTE — PROGRESS NOTES
06/14/24 1000   [facility] Group Therapy   Group Name Therapeutic Recreation   Specific Interventions Skilled Activity Mild Exercises   Participation Level None   Participation Quality Refused  (despite encouragement)

## 2024-06-14 NOTE — PROGRESS NOTES
"6/14/2024  Jurgen Berg   1995   [id number]        Psychiatry Progress Note     Chief Complaint: I found God while I was here    SUBJECTIVE:   Jurgen Berg is a 28 y.o. male placed under a PEC at [facility] after his coworker called [employer] due to patient's rapid speech and erratic.     Today patient states that he is feeling thankful. Much less elevated. He was calm and polite during this exam. He states that his mood is great. He is tolerating the medication well without issue. States that he is sleeping and eating well. He endorsed feeling like his normal self again. Will continue with current POC and monitor for need to augment.        Current Medications:   Scheduled Meds:    nicotine  1 patch Transdermal Daily    OLANZapine  5 mg Oral BID      PRN Meds:   Current Facility-Administered Medications:     acetaminophen, 650 mg, Oral, Q6H PRN    aluminum-magnesium hydroxide-simethicone, 30 mL, Oral, Q6H PRN    haloperidoL, 10 mg, Oral, Q4H PRN **AND** diphenhydrAMINE, 50 mg, Oral, Q4H PRN **AND** LORazepam, 2 mg, Oral, Q4H PRN **AND** haloperidol lactate, 10 mg, Intramuscular, Q4H PRN **AND** diphenhydrAMINE, 50 mg, Intramuscular, Q4H PRN **AND** lorazepam, 2 mg, Intramuscular, Q4H PRN    hydrOXYzine HCL, 50 mg, Oral, Q4H PRN    magnesium hydroxide 400 mg/5 ml, 30 mL, Oral, Daily PRN    ondansetron, 4 mg, Oral, Q6H PRN    traZODone, 100 mg, Oral, Nightly PRN   Psychotherapeutics (From admission, onward)      Start     Stop Route Frequency Ordered    06/12/24 0915  OLANZapine tablet 5 mg         -- Oral 2 times daily 06/12/24 0908 06/11/24 1943  traZODone tablet 100 mg         -- Oral Nightly PRN 06/11/24 1943 06/11/24 1942  haloperidoL tablet 10 mg  (Med - Acute  Behavioral Management)        Placed in "And" Linked Group    -- Oral Every 4 hours PRN 06/11/24 1942 06/11/24 1942  LORazepam tablet 2 mg  (Med - Acute  Behavioral Management)        Placed in "And" Linked Group    -- Oral Every 4 hours PRN 06/11/24 1942 " "   06/11/24 1942  haloperidol lactate injection 10 mg  (Med - Acute  Behavioral Management)        Placed in "And" Linked Group    -- IM Every 4 hours PRN 06/11/24 1942 06/11/24 1942  LORazepam injection 2 mg  (Med - Acute  Behavioral Management)        Placed in "And" Linked Group    -- IM Every 4 hours PRN 06/11/24 1942            Allergies:   Review of patient's allergies indicates:  No Known Allergies     OBJECTIVE:   Vitals   Vitals:    06/14/24 0701   BP: 123/85   Pulse: 63   Resp:    Temp: 97.4 °F (36.3 °C)        Labs/Imaging/Studies:   No results found for this or any previous visit (from the past 36 hour(s)).         Medical Review Of Systems:  A comprehensive review of systems was negative.      Psychiatric Mental Status Exam:  General Appearance: appears stated age, well-developed, well-nourished  Arousal: alert  Behavior: somewhat uncooperative  Movements and Motor Activity: no abnormal involuntary movements noted  Orientation: oriented to person, place, time, and situation  Speech: normal rate, normal rhythm, normal volume, normal tone  Mood: "Ok"  Affect: constricted  Thought Process: linear  Associations: fair  Thought Content and Perceptions: grandiose, hyper-Congregation, paranoid, no suicidal ideation, no homicidal ideation  Recent and Remote Memory: recent memory intact, remote memory intact; per interview/observation with patient  Attention and Concentration: intact, attentive to conversation; per interview/observation with patient  Fund of Knowledge: intact, aware of current events, vocabulary appropriate; based on history, vocabulary, fund of knowledge, syntax, grammar, and content  Insight: impaired; based on understanding of severity of illness and HPI  Judgment: impaired; based on patient's behavior and HPI    ASSESSMENT/PLAN:   Problems Addressed/Diagnoses:  Bipolar Disorder, most recent episode manic, severe, with psychosis (F31.2)  Cannabis use disorder (F12.20)    Past Medical History: "   Diagnosis Date    Bipolar disorder         Plan:  Bipolar disorder, chronic with acute exacerbation  -Zyprexa 5mg BID     Cannabis use, chronic with acute exacerbation  -Group/Individual psychotherapy     Expected Disposition Plan: [facility]

## 2024-06-14 NOTE — NURSING
"PRN Medication Follow-up Note:    Behavior:    Patient (Jurgen Berg is a 28 y.o. male, : 1995, MRN: [id number])     Allergies: Patient has no known allergies.    [place]  height is 5' 5" (1.651 m) and weight is 56.4 kg (124 lb 6.4 oz). His oral temperature is 97.6 °F (36.4 °C). His blood pressure is 129/75 and his pulse is 92. His respiration is 16 and oxygen saturation is 96%.     Administered atarax,trazadone_______ per physician order to Jurgen       Intervention:    Intervention to Jurgen's response: none needed_________.       Response:    Jurgen's response: no further c/o voiced_________      Plan:     Continue to monitor per MD/PA/APRN orders; and reevaluate medication effectiveness within 30 minutes.   "

## 2024-06-15 PROCEDURE — 25000003 PHARM REV CODE 250: Performed by: PSYCHIATRY & NEUROLOGY

## 2024-06-15 PROCEDURE — 11400000 HC PSYCH PRIVATE ROOM

## 2024-06-15 RX ADMIN — OLANZAPINE 5 MG: 5 TABLET, FILM COATED ORAL at 08:06

## 2024-06-15 RX ADMIN — TRAZODONE HYDROCHLORIDE 100 MG: 100 TABLET ORAL at 08:06

## 2024-06-15 NOTE — PLAN OF CARE
Problem: Adult Behavioral Health Plan of Care  Goal: Plan of Care Review  Outcome: Progressing  Goal: Patient-Specific Goal (Individualization)  Outcome: Progressing  Goal: Adheres to Safety Considerations for Self and Others  Outcome: Progressing  Goal: Absence of New-Onset Illness or Injury  Outcome: Progressing  Goal: Optimized Coping Skills in Response to Life Stressors  Outcome: Progressing  Goal: Develops/Participates in Therapeutic [place] to Support Successful Transition  Outcome: Progressing  Goal: Rounds/Family Conference  Outcome: Progressing     Problem: Violence Risk or Actual  Goal: Anger and Impulse Control  Outcome: Progressing     Problem: Psychotic Signs/Symptoms  Goal: Improved Behavioral Control (Psychotic Signs/Symptoms)  Outcome: Progressing  Goal: Optimal Cognitive Function (Psychotic Signs/Symptoms)  Outcome: Progressing  Goal: Increased Participation and Engagement (Psychotic Signs/Symptoms)  Outcome: Progressing  Goal: Improved Mood Symptoms (Psychotic Signs/Symptoms)  Outcome: Progressing  Goal: Improved Psychomotor Symptoms (Psychotic Signs/Symptoms)  Outcome: Progressing  Goal: Decreased Sensory Symptoms (Psychotic Signs/Symptoms)  Outcome: Progressing  Goal: Improved Sleep (Psychotic Signs/Symptoms)  Outcome: Progressing  Goal: Enhanced Social, Occupational or Functional Skills (Psychotic Signs/Symptoms)  Outcome: Progressing         He is AAO X 4. Patents presents with elated affect and manic behavior. He voices AH states he hears GOD. He also states he feels someone's presence over him. He is guarded. Good eye contact noted. Patient states he feels the medication is working and he plans to stay on his meds this time. He currently denies SI, HI. Will continue plan of care and provide a safe and therapeutic environment.

## 2024-06-15 NOTE — PLAN OF CARE
Problem: Adult Behavioral Health Plan of Care  Goal: Plan of Care Review  Outcome: Progressing  Flowsheets (Taken 6/15/2024 0832)  Patient Agreement with Plan of Care: agrees  Plan of Care Reviewed With: patient  Goal: Patient-Specific Goal (Individualization)  Outcome: Progressing  Flowsheets (Taken 6/15/2024 0832)  Patient Personal Strengths: independent living skills  Patient Vulnerabilities: limited support system  Goal: Adheres to Safety Considerations for Self and Others  Outcome: Progressing  Flowsheets (Taken 6/15/2024 0832)  Adheres to Safety Considerations for Self and Others: making progress toward outcome  Intervention: Develop and Maintain Individualized Safety Plan  Flowsheets (Taken 6/15/2024 0832)  Safety Measures: safety rounds completed  Goal: Absence of New-Onset Illness or Injury  Outcome: Progressing  Intervention: Identify and Manage Fall Risk  Flowsheets (Taken 6/15/2024 0832)  Safety Measures: safety rounds completed  Intervention: Prevent VTE (Venous Thromboembolism)  Flowsheets (Taken 6/15/2024 0832)  VTE Prevention/Management: fluids promoted  Intervention: Prevent Infection  Flowsheets (Taken 6/15/2024 0832)  Infection Prevention: hand hygiene promoted  Goal: Optimized Coping Skills in Response to Life Stressors  Outcome: Progressing  Flowsheets (Taken 6/15/2024 0832)  Optimized Coping Skills in Response to Life Stressors: making progress toward outcome  Intervention: Promote Effective Coping Strategies  Flowsheets (Taken 6/15/2024 0832)  Supportive Measures: self-care encouraged  Goal: Develops/Participates in Therapeutic [place] to Support Successful Transition  Outcome: Progressing  Flowsheets (Taken 6/15/2024 0832)  Develops/Participates in Therapeutic [place] to Support Successful Transition: making progress toward outcome  Intervention: Foster Therapeutic [place]  Flowsheets (Taken 6/15/2024 0832)  Trust Relationship/Rapport: care explained  Goal: Rounds/Family  Conference  Outcome: Progressing  Flowsheets (Taken 6/15/2024 0832)  Participants:   milieu/psych techs   nursing     Problem: Violence Risk or Actual  Goal: Anger and Impulse Control  Outcome: Progressing  Intervention: Minimize Safety Risk  Flowsheets (Taken 6/15/2024 0832)  Behavior Management: behavioral plan reviewed  Sensory Stimulation Regulation: quiet environment promoted  De-Escalation Techniques: quiet time facilitated  Intervention: Promote Self-Control  Flowsheets (Taken 6/15/2024 0832)  Supportive Measures: self-care encouraged  Environmental Support: calm environment promoted     Problem: Psychotic Signs/Symptoms  Goal: Improved Behavioral Control (Psychotic Signs/Symptoms)  Outcome: Progressing  Flowsheets (Taken 6/15/2024 0832)  Mutually Determined Action Steps (Improved Behavioral Control): identifies symptoms triggers  Intervention: Manage Behavior  Flowsheets (Taken 6/15/2024 0832)  De-Escalation Techniques: quiet time facilitated  Goal: Optimal Cognitive Function (Psychotic Signs/Symptoms)  Outcome: Progressing  Flowsheets (Taken 6/15/2024 0832)  Mutually Determined Action Steps (Optimal Cognitive Function): participates in attention training  Intervention: Support and Promote Cognitive Ability  Flowsheets (Taken 6/15/2024 0832)  Trust Relationship/Rapport: care explained  Goal: Increased Participation and Engagement (Psychotic Signs/Symptoms)  Outcome: Progressing  Flowsheets (Taken 6/15/2024 0832)  Mutually Determined Action Steps (Increased Participation and Engagement): identifies symptoms triggers  Intervention: Facilitate Participation and Engagement  Flowsheets (Taken 6/15/2024 0832)  Supportive Measures: self-care encouraged  Diversional Activity: television  Goal: Improved Mood Symptoms (Psychotic Signs/Symptoms)  Outcome: Progressing  Flowsheets (Taken 6/15/2024 0832)  Mutually Determined Action Steps (Improved Mood Symptoms): acknowledges progress  Intervention: Optimize Emotion and  Mood  Flowsheets (Taken 6/15/2024 0832)  Supportive Measures: self-care encouraged  Diversional Activity: television  Goal: Improved Psychomotor Symptoms (Psychotic Signs/Symptoms)  Outcome: Progressing  Flowsheets (Taken 6/15/2024 0832)  Mutually Determined Action Steps (Improved Psychomotor Symptoms): adheres to medication regimen  Intervention: Manage Psychomotor Movement  Flowsheets (Taken 6/15/2024 0832)  Activity (Behavioral Health): up ad anisa  Patient Performed Hygiene: teeth brushed  Diversional Activity: television  Goal: Decreased Sensory Symptoms (Psychotic Signs/Symptoms)  Outcome: Progressing  Flowsheets (Taken 6/15/2024 0832)  Mutually Determined Action Steps (Decreased Sensory Symptoms): adheres to medication regimen  Intervention: Minimize and Manage Sensory Impairment  Flowsheets (Taken 6/15/2024 0832)  Sensory Stimulation Regulation: quiet environment promoted  Goal: Improved Sleep (Psychotic Signs/Symptoms)  Outcome: Progressing  Flowsheets (Taken 6/15/2024 0832)  Mutually Determined Action Steps (Improved Sleep): sleeps 4-6 hours at night  Intervention: Promote Healthy Sleep Hygiene  Flowsheets (Taken 6/15/2024 0832)  Sleep Hygiene Promotion: regular sleep pattern promoted  Goal: Enhanced Social, Occupational or Functional Skills (Psychotic Signs/Symptoms)  Outcome: Progressing  Flowsheets (Taken 6/15/2024 0832)  Mutually Determined Action Steps (Enhanced Social, Occupational or Functional Skills): participates in social skills training  Intervention: Promote Social, Occupational and Functional Ability  Flowsheets (Taken 6/15/2024 0832)  Trust Relationship/Rapport: care explained  Social Functional Ability Promotion: social interaction promoted    He is AAO X 4. Euphoric this AM. Anxious. Religiously preoccupied. He states that God is speaking to him. Denies SI and HI. Interacts with all patients and staff. Flight of Ideas noted. Good eye contact noted. Speech normal tone, but rambling. Continue  plan of care and provide a safe and therapeutic environment. Continue to monitor every fifteen minutes for safety.

## 2024-06-15 NOTE — NURSING
At 20:20 patient endorsed trouble sleeping and was given PRN trazodone. At 22:00 when rounding patient was in bed resting

## 2024-06-15 NOTE — PROGRESS NOTES
06/15/24 1030   [facility] Group Therapy   Group Name Community Reintegration   Specific Interventions Relapse Prevention   Participation Level None   Participation Quality Sleeping   Insight/Motivation None;Other (see comments)  (didn't attend)

## 2024-06-16 PROCEDURE — 25000003 PHARM REV CODE 250: Performed by: PSYCHIATRY & NEUROLOGY

## 2024-06-16 PROCEDURE — 11400000 HC PSYCH PRIVATE ROOM

## 2024-06-16 RX ADMIN — HYDROXYZINE HYDROCHLORIDE 50 MG: 50 TABLET, FILM COATED ORAL at 08:06

## 2024-06-16 RX ADMIN — TRAZODONE HYDROCHLORIDE 100 MG: 100 TABLET ORAL at 08:06

## 2024-06-16 RX ADMIN — OLANZAPINE 5 MG: 5 TABLET, FILM COATED ORAL at 08:06

## 2024-06-16 NOTE — PLAN OF CARE
Problem: Adult Behavioral Health Plan of Care  Goal: Plan of Care Review  Outcome: Met  Goal: Patient-Specific Goal (Individualization)  Outcome: Met  Flowsheets (Taken 6/16/2024 0146)  Patient Personal Strengths: independent living skills  Patient Vulnerabilities:   substance abuse/addiction   limited support system  Goal: Adheres to Safety Considerations for Self and Others  Outcome: Met  Flowsheets (Taken 6/16/2024 0146)  Adheres to Safety Considerations for Self and Others: making progress toward outcome  Intervention: Develop and Maintain Individualized Safety Plan  Flowsheets (Taken 6/16/2024 0146)  Safety Measures:   safety rounds completed   environmental rounds completed   monitored by video  Goal: Absence of New-Onset Illness or Injury  Outcome: Met  Intervention: Identify and Manage Fall Risk  Flowsheets (Taken 6/16/2024 0146)  Safety Measures:   safety rounds completed   environmental rounds completed   monitored by video  Intervention: Prevent VTE (Venous Thromboembolism)  Flowsheets (Taken 6/16/2024 0146)  VTE Prevention/Management:   ambulation promoted   fluids promoted  Intervention: Prevent Infection  Flowsheets (Taken 6/16/2024 0146)  Infection Prevention:   hand hygiene promoted   rest/sleep promoted  Goal: Optimized Coping Skills in Response to Life Stressors  Outcome: Met  Flowsheets (Taken 6/16/2024 0146)  Optimized Coping Skills in Response to Life Stressors: making progress toward outcome  Intervention: Promote Effective Coping Strategies  Flowsheets (Taken 6/16/2024 0146)  Supportive Measures:   verbalization of feelings encouraged   active listening utilized  Goal: Develops/Participates in Therapeutic [place] to Support Successful Transition  Outcome: Met  Flowsheets (Taken 6/16/2024 0146)  Develops/Participates in Therapeutic [place] to Support Successful Transition: making progress toward outcome  Intervention: Foster Therapeutic [place]  Flowsheets (Taken 6/16/2024 0146)  Trust  Relationship/Rapport:   care explained   questions encouraged  Intervention: Mutually Develop Transition Plan  Flowsheets (Taken 6/16/2024 0146)  Current Discharge Risk:   psychiatric illness   substance use/abuse  Readmission Within the Last 30 Days: no previous admission in last 30 days  Outpatient/Agency/Support Group Needs:   outpatient counseling   outpatient medication management  Anticipated Discharge Disposition: home with family  Patient/Family Anticipated Services at Transition:   outpatient care   mental health services  Patient/Family Anticipates Transition to: home with family  Concerns to be Addressed:   mental health   medication   substance/tobacco abuse/use  Goal: Rounds/Family Conference  Outcome: Met  Flowsheets (Taken 6/16/2024 0146)  Participants:   milieu/psych techs   nursing     Problem: Violence Risk or Actual  Goal: Anger and Impulse Control  Outcome: Progressing  Intervention: Minimize Safety Risk  Flowsheets (Taken 6/16/2024 0146)  Behavior Management: behavioral plan reviewed  Sensory Stimulation Regulation: quiet environment promoted  De-Escalation Techniques:   medication offered   medication administered  Enhanced Safety Measures: monitored by video  Intervention: Promote Self-Control  Flowsheets (Taken 6/16/2024 0146)  Supportive Measures:   verbalization of feelings encouraged   active listening utilized  Environmental Support:   calm environment promoted   rest periods encouraged     Problem: Psychotic Signs/Symptoms  Goal: Improved Behavioral Control (Psychotic Signs/Symptoms)  Outcome: Progressing  Intervention: Manage Behavior  Flowsheets (Taken 6/16/2024 0146)  De-Escalation Techniques:   medication offered   medication administered  Goal: Optimal Cognitive Function (Psychotic Signs/Symptoms)  Outcome: Progressing  Intervention: Support and Promote Cognitive Ability  Flowsheets (Taken 6/16/2024 0146)  Trust Relationship/Rapport:   care explained   questions encouraged  Goal:  Increased Participation and Engagement (Psychotic Signs/Symptoms)  Outcome: Progressing  Intervention: Facilitate Participation and Engagement  Flowsheets (Taken 6/16/2024 0146)  Supportive Measures:   verbalization of feelings encouraged   active listening utilized  Diversional Activity: television  Goal: Improved Mood Symptoms (Psychotic Signs/Symptoms)  Outcome: Progressing  Flowsheets (Taken 6/16/2024 0146)  Mutually Determined Action Steps (Improved Mood Symptoms): acknowledges progress  Intervention: Optimize Emotion and Mood  Flowsheets (Taken 6/16/2024 0146)  Supportive Measures:   verbalization of feelings encouraged   active listening utilized  Diversional Activity: television  Goal: Improved Psychomotor Symptoms (Psychotic Signs/Symptoms)  Outcome: Progressing  Flowsheets (Taken 6/16/2024 0146)  Mutually Determined Action Steps (Improved Psychomotor Symptoms): adheres to medication regimen  Intervention: Manage Psychomotor Movement  Flowsheets (Taken 6/16/2024 0146)  Activity (Behavioral Health): up ad anisa  Patient Performed Hygiene: shower  Diversional Activity: television  Goal: Decreased Sensory Symptoms (Psychotic Signs/Symptoms)  Outcome: Progressing  Intervention: Minimize and Manage Sensory Impairment  Flowsheets (Taken 6/16/2024 0146)  Sensory Stimulation Regulation: quiet environment promoted  Goal: Improved Sleep (Psychotic Signs/Symptoms)  Outcome: Progressing  Flowsheets (Taken 6/16/2024 0146)  Mutually Determined Action Steps (Improved Sleep): sleeps 4-6 hours at night  Intervention: Promote Healthy Sleep Hygiene  Flowsheets (Taken 6/16/2024 0146)  Sleep Hygiene Promotion:   awakenings minimized   regular sleep pattern promoted  Goal: Enhanced Social, Occupational or Functional Skills (Psychotic Signs/Symptoms)  Outcome: Progressing  Intervention: Promote Social, Occupational and Functional Ability  Flowsheets (Taken 6/16/2024 0146)  Trust Relationship/Rapport:   care explained   questions  encouraged  Social Functional Ability Promotion: self-expression encouraged   AAOx4. Flat affect. Anxious and sad mood. Denies suicidal ideations and hallucinations. Restless and pacing in hallway. Pleasant and happy that his sister came to visit today. Compliant with medications. Reports that medications are effective. Reports that he is eating and sleeping well. Minimal interaction with staff and peers. Pt did not attend group today. No agitation or aggression noted. Continue with plan of care and q 15 minute safety checks.

## 2024-06-16 NOTE — PROGRESS NOTES
06/16/24 1015   [facility] Group Therapy   Group Name Mental Awareness   Specific Interventions Resocialization   Participation Level Active   Participation Quality Cooperative   Insight/Motivation Limited   Affect/Mood Display Impulsive   Cognition Alert   Psychomotor WNL

## 2024-06-16 NOTE — PLAN OF CARE
Problem: Violence Risk or Actual  Goal: Anger and Impulse Control  Outcome: Progressing  Intervention: Minimize Safety Risk  Flowsheets (Taken 6/16/2024 0813)  Behavior Management: behavioral plan reviewed  Sensory Stimulation Regulation: quiet environment promoted  De-Escalation Techniques: quiet time facilitated  Intervention: Promote Self-Control  Flowsheets (Taken 6/16/2024 0813)  Supportive Measures: verbalization of feelings encouraged  Environmental Support: calm environment promoted     Problem: Psychotic Signs/Symptoms  Goal: Improved Behavioral Control (Psychotic Signs/Symptoms)  Outcome: Progressing  Flowsheets (Taken 6/16/2024 0813)  Mutually Determined Action Steps (Improved Behavioral Control): identifies symptoms triggers  Intervention: Manage Behavior  Flowsheets (Taken 6/16/2024 0813)  De-Escalation Techniques: quiet time facilitated  Goal: Optimal Cognitive Function (Psychotic Signs/Symptoms)  Outcome: Progressing  Flowsheets (Taken 6/16/2024 0813)  Mutually Determined Action Steps (Optimal Cognitive Function): participates in attention training  Intervention: Support and Promote Cognitive Ability  Flowsheets (Taken 6/16/2024 0813)  Trust Relationship/Rapport: care explained  Goal: Increased Participation and Engagement (Psychotic Signs/Symptoms)  Outcome: Progressing  Flowsheets (Taken 6/16/2024 0813)  Mutually Determined Action Steps (Increased Participation and Engagement): identifies symptoms triggers  Intervention: Facilitate Participation and Engagement  Flowsheets (Taken 6/16/2024 0813)  Supportive Measures: verbalization of feelings encouraged  Diversional Activity: television  Goal: Improved Mood Symptoms (Psychotic Signs/Symptoms)  Outcome: Progressing  Flowsheets (Taken 6/16/2024 0813)  Mutually Determined Action Steps (Improved Mood Symptoms): acknowledges progress  Intervention: Optimize Emotion and Mood  Flowsheets (Taken 6/16/2024 0813)  Supportive Measures: verbalization of  feelings encouraged  Diversional Activity: television  Goal: Improved Psychomotor Symptoms (Psychotic Signs/Symptoms)  Outcome: Progressing  Flowsheets (Taken 6/16/2024 0813)  Mutually Determined Action Steps (Improved Psychomotor Symptoms): adheres to medication regimen  Intervention: Manage Psychomotor Movement  Flowsheets (Taken 6/16/2024 0813)  Activity (Behavioral Health): up ad anisa  Patient Performed Hygiene: teeth brushed  Diversional Activity: television  Goal: Decreased Sensory Symptoms (Psychotic Signs/Symptoms)  Outcome: Progressing  Flowsheets (Taken 6/16/2024 0813)  Mutually Determined Action Steps (Decreased Sensory Symptoms): adheres to medication regimen  Intervention: Minimize and Manage Sensory Impairment  Flowsheets (Taken 6/16/2024 0813)  Sensory Stimulation Regulation: quiet environment promoted  Goal: Improved Sleep (Psychotic Signs/Symptoms)  Outcome: Progressing  Flowsheets (Taken 6/16/2024 0813)  Mutually Determined Action Steps (Improved Sleep): sleeps 4-6 hours at night  Intervention: Promote Healthy Sleep Hygiene  Flowsheets (Taken 6/16/2024 0813)  Sleep Hygiene Promotion: awakenings minimized  Goal: Enhanced Social, Occupational or Functional Skills (Psychotic Signs/Symptoms)  Outcome: Progressing  Flowsheets (Taken 6/16/2024 0813)  Mutually Determined Action Steps (Enhanced Social, Occupational or Functional Skills): participates in social skills training  Intervention: Promote Social, Occupational and Functional Ability  Flowsheets (Taken 6/16/2024 0813)  Trust Relationship/Rapport: care explained  Social Functional Ability Promotion: self-expression encouraged    He is AAO X 4. Euphoric this AM. Anxious. Pacing in the [facility] talking to himself. Religiously preoccupied. States he speaks to God frequently and God speaks to him. Interacts with selected patients and staff. Suspicious. Good eye contact noted. Speech normal tone and speed at present time. Denies SI and HI. Continue  plan of care and provide a safe and therapeutic environment. Continue to monitor every fifteen minutes for safety.

## 2024-06-17 PROBLEM — F31.2 BIPOLAR I, MOST RECENT EPISODE MANIC, SEVERE WITH PSYCHOTIC BEHAVIOR: Status: ACTIVE | Noted: 2024-06-17

## 2024-06-17 PROCEDURE — 11400000 HC PSYCH PRIVATE ROOM

## 2024-06-17 PROCEDURE — 25000003 PHARM REV CODE 250: Performed by: PSYCHIATRY & NEUROLOGY

## 2024-06-17 RX ORDER — OLANZAPINE 5 MG/1
5 TABLET ORAL 2 TIMES DAILY
Qty: 60 TABLET | Refills: 0 | Status: SHIPPED | OUTPATIENT
Start: 2024-06-17 | End: 2025-06-17

## 2024-06-17 RX ADMIN — OLANZAPINE 5 MG: 5 TABLET, FILM COATED ORAL at 08:06

## 2024-06-17 RX ADMIN — TRAZODONE HYDROCHLORIDE 100 MG: 100 TABLET ORAL at 08:06

## 2024-06-17 NOTE — NURSING
At 20:19 the patient was given PRN atarax for anxiety and PRN trazodone for sleep. At midnight the patient was resting in bed

## 2024-06-17 NOTE — PROGRESS NOTES
"6/17/2024  Jurgen Berg   1995   [id number]        Psychiatry Progress Note     Chief Complaint: "Fantastic"    SUBJECTIVE:   Jurgen Berg is a 28 y.o. male placed under a PEC at [facility] after his coworker called [employer] due to patient's rapid speech and erratic.     States that the weekend was "good and mellow."  Does report appetite improving.  Has been sleeping appropriately.  Lives with his mother and brother in [place].  Tolerating current medication regimen without issues.  Much less elevated than on admit.  No acute complaints.  Will plan for discharge soon.    UDS: (+)benzodiazepines, cannabis  Blood alcohol: <10    Current Medications:   Scheduled Meds:    OLANZapine  5 mg Oral BID      PRN Meds:   Current Facility-Administered Medications:     acetaminophen, 650 mg, Oral, Q6H PRN    aluminum-magnesium hydroxide-simethicone, 30 mL, Oral, Q6H PRN    haloperidoL, 10 mg, Oral, Q4H PRN **AND** diphenhydrAMINE, 50 mg, Oral, Q4H PRN **AND** LORazepam, 2 mg, Oral, Q4H PRN **AND** haloperidol lactate, 10 mg, Intramuscular, Q4H PRN **AND** diphenhydrAMINE, 50 mg, Intramuscular, Q4H PRN **AND** lorazepam, 2 mg, Intramuscular, Q4H PRN    hydrOXYzine HCL, 50 mg, Oral, Q4H PRN    magnesium hydroxide 400 mg/5 ml, 30 mL, Oral, Daily PRN    ondansetron, 4 mg, Oral, Q6H PRN    traZODone, 100 mg, Oral, Nightly PRN   Psychotherapeutics (From admission, onward)      Start     Stop Route Frequency Ordered    06/12/24 0915  OLANZapine tablet 5 mg         -- Oral 2 times daily 06/12/24 0908 06/11/24 1943  traZODone tablet 100 mg         -- Oral Nightly PRN 06/11/24 1943 06/11/24 1942  haloperidoL tablet 10 mg  (Med - Acute  Behavioral Management)        Placed in "And" Linked Group    -- Oral Every 4 hours PRN 06/11/24 1942 06/11/24 1942  LORazepam tablet 2 mg  (Med - Acute  Behavioral Management)        Placed in "And" Linked Group    -- Oral Every 4 hours PRN 06/11/24 1942 06/11/24 1942  haloperidol lactate injection 10 " "mg  (Med - Acute  Behavioral Management)        Placed in "And" Linked Group    -- IM Every 4 hours PRN 06/11/24 1942 06/11/24 1942  LORazepam injection 2 mg  (Med - Acute  Behavioral Management)        Placed in "And" Linked Group    -- IM Every 4 hours PRN 06/11/24 1942            Allergies:   Review of patient's allergies indicates:  No Known Allergies     OBJECTIVE:   Vitals   Vitals:    06/16/24 1730   BP: 116/76   Pulse: 67   Resp:    Temp: 98.6 °F (37 °C)        Labs/Imaging/Studies:   No results found for this or any previous visit (from the past 36 hour(s)).       Medical Review Of Systems:  Constitutional: negative  Respiratory: negative  Cardiovascular: negative  Gastrointestinal: negative  Genitourinary:negative  Musculoskeletal:negative  Neurological: negative       Psychiatric Mental Status Exam:  General Appearance: appears stated age, well-developed, well-nourished  Arousal: alert  Behavior: cooperative  Movements and Motor Activity: no abnormal involuntary movements noted  Orientation: oriented to person, place, time, and situation  Speech: normal rate, normal rhythm, normal volume, normal tone  Mood: "Ok"  Affect: constricted  Thought Process: linear  Associations: intact  Thought Content and Perceptions: no suicidal ideation, no homicidal ideation, no auditory hallucinations, no visual hallucinations, no paranoid ideation, no ideas of reference  Recent and Remote Memory: recent memory intact, remote memory intact; per interview/observation with patient  Attention and Concentration: intact, attentive to conversation; per interview/observation with patient  Fund of Knowledge: intact, aware of current events, vocabulary appropriate; based on history, vocabulary, fund of knowledge, syntax, grammar, and content  Insight: questionable; based on understanding of severity of illness and HPI  Judgment: questionable; based on patient's behavior and HPI     ASSESSMENT/PLAN:   Problems " Addressed/Diagnoses:  Bipolar Disorder, most recent episode manic, severe, with psychosis (F31.2)  Cannabis use disorder (F12.20)    Past Medical History:   Diagnosis Date    Bipolar disorder         Plan:  Bipolar disorder, chronic with acute exacerbation  -Continue Zyprexa 5mg BID     Cannabis use, chronic with acute exacerbation  -Group/Individual psychotherapy       Expected Disposition Plan: Home tomorrow        Elfego Booth M.D.

## 2024-06-17 NOTE — PROGRESS NOTES
06/17/24 1500   [facility] Group Therapy   Group Name Therapeutic Recreation   Specific Interventions Skilled Activity Creative Expression   Participation Level None   Participation Quality Withdrawn   Insight/Motivation Limited   Affect/Mood Display Restless;Bizarre;Blunted   Cognition Pre-Occupied   Psychomotor WNL

## 2024-06-17 NOTE — GROUP NOTE
Group Psychotherapy       Group Focus: Stress Management   Group Topic: Mindfulness. Therapist explored patients need for increase in awareness of mindfulness, how it effects our moods and behaviors. SW discussed importance of mindfulness to decrease negative core values and thoughts, coping skills . Mindfulness activity used to encourage development of healthy coping and relaxation skills. ?       Number of patients in attendance: 7    Group Start Time: 1045  Group End Time:  1130  Groups Date: 6/17/2024  Group Topic:  Behavioral Health  Group Department: [facility] Behavioral Health Unit  Group Facilitators:  Gail Eddy  _____________________________________________________________________    Patient Name: Jurgen Berg  MRN: [id number]  Patient Class: IP- Psych   Admission Date\Time: 6/11/2024  7:30 PM  Hospital Length of Stay: 6  Primary Care Provider: Qiana, Primary Doctor     Referred by: Acute Psychiatry Unit Treatment Team     Target symptoms: Substance Abuse; Mood Disorder     Patient's response to treatment: Not Participating; Patient did not attend group, alternative was provided     Progress toward goals: Progressing slowly     Interval History:      Diagnosis:      Plan: Continue treatment on APU

## 2024-06-17 NOTE — PROGRESS NOTES
06/17/24 1000   [facility] Group Therapy   Group Name Therapeutic Recreation   Specific Interventions Skilled Activity Mild Exercises   Participation Level None   Participation Quality Refused  (despite encouragement)

## 2024-06-17 NOTE — PLAN OF CARE
Problem: Violence Risk or Actual  Goal: Anger and Impulse Control  Outcome: Progressing  Intervention: Minimize Safety Risk  Flowsheets (Taken 6/17/2024 0045)  Behavior Management: behavioral plan reviewed  Sensory Stimulation Regulation: quiet environment promoted  De-Escalation Techniques:   medication administered   medication offered  Enhanced Safety Measures: monitored by video  Intervention: Promote Self-Control  Flowsheets (Taken 6/17/2024 0045)  Supportive Measures: verbalization of feelings encouraged  Environmental Support:   calm environment promoted   environmental consistency promoted     Problem: Psychotic Signs/Symptoms  Goal: Improved Behavioral Control (Psychotic Signs/Symptoms)  Outcome: Progressing  Intervention: Manage Behavior  Flowsheets (Taken 6/17/2024 0045)  De-Escalation Techniques:   medication administered   medication offered  Goal: Optimal Cognitive Function (Psychotic Signs/Symptoms)  Outcome: Progressing  Intervention: Support and Promote Cognitive Ability  Flowsheets (Taken 6/17/2024 0045)  Trust Relationship/Rapport:   care explained   questions encouraged  Goal: Increased Participation and Engagement (Psychotic Signs/Symptoms)  Outcome: Progressing  Flowsheets (Taken 6/17/2024 0045)  Mutually Determined Action Steps (Increased Participation and Engagement): identifies symptoms triggers  Intervention: Facilitate Participation and Engagement  Flowsheets (Taken 6/17/2024 0045)  Supportive Measures: verbalization of feelings encouraged  Diversional Activity: television  Goal: Improved Mood Symptoms (Psychotic Signs/Symptoms)  Outcome: Progressing  Flowsheets (Taken 6/17/2024 0045)  Mutually Determined Action Steps (Improved Mood Symptoms): acknowledges progress  Intervention: Optimize Emotion and Mood  Flowsheets (Taken 6/17/2024 0045)  Supportive Measures: verbalization of feelings encouraged  Diversional Activity: television  Goal: Improved Psychomotor Symptoms (Psychotic  Signs/Symptoms)  Outcome: Progressing  Flowsheets (Taken 6/17/2024 0045)  Mutually Determined Action Steps (Improved Psychomotor Symptoms): adheres to medication regimen  Intervention: Manage Psychomotor Movement  Flowsheets (Taken 6/17/2024 0045)  Activity (Behavioral Health): up ad anisa  Diversional Activity: television  Goal: Decreased Sensory Symptoms (Psychotic Signs/Symptoms)  Outcome: Progressing  Flowsheets (Taken 6/17/2024 0045)  Mutually Determined Action Steps (Decreased Sensory Symptoms): adheres to medication regimen  Intervention: Minimize and Manage Sensory Impairment  Flowsheets (Taken 6/17/2024 0045)  Sensory Stimulation Regulation: quiet environment promoted  Goal: Improved Sleep (Psychotic Signs/Symptoms)  Outcome: Progressing  Flowsheets (Taken 6/17/2024 0045)  Mutually Determined Action Steps (Improved Sleep): sleeps 4-6 hours at night  Intervention: Promote Healthy Sleep Hygiene  Flowsheets (Taken 6/17/2024 0045)  Sleep Hygiene Promotion:   awakenings minimized   regular sleep pattern promoted  Goal: Enhanced Social, Occupational or Functional Skills (Psychotic Signs/Symptoms)  Outcome: Progressing  Flowsheets (Taken 6/17/2024 0045)  Mutually Determined Action Steps (Enhanced Social, Occupational or Functional Skills): participates in social skills training  Intervention: Promote Social, Occupational and Functional Ability  Flowsheets (Taken 6/17/2024 0045)  Trust Relationship/Rapport:   care explained   questions encouraged  Social Functional Ability Promotion: social interaction promoted   AAOx4. Flat affect.  Anxious and depressed mood. Denies suicidal ideations and endorses auditory hallucinations. Reports hearing the voice of God. Restless pacing in the hallway.  Compliant with medications. Reports that medications are effective. Request Atarax and Trazodone for anxiety and sleep. Reports that he is eating and sleeping well. Minimal interaction with staff and peers. Patient attended group  today. No agitation or aggression noted. Continue with plan of care and q 15 minute safety checks.

## 2024-06-17 NOTE — PLAN OF CARE
Problem: Violence Risk or Actual  Goal: Anger and Impulse Control  Outcome: Progressing     Problem: Psychotic Signs/Symptoms  Goal: Improved Behavioral Control (Psychotic Signs/Symptoms)  Outcome: Progressing  Goal: Optimal Cognitive Function (Psychotic Signs/Symptoms)  Outcome: Progressing  Goal: Increased Participation and Engagement (Psychotic Signs/Symptoms)  Outcome: Progressing  Flowsheets (Taken 6/17/2024 1341)  Mutually Determined Action Steps (Increased Participation and Engagement): identifies symptoms triggers  Goal: Improved Mood Symptoms (Psychotic Signs/Symptoms)  Outcome: Progressing  Goal: Improved Psychomotor Symptoms (Psychotic Signs/Symptoms)  Outcome: Progressing  Flowsheets (Taken 6/17/2024 1341)  Mutually Determined Action Steps (Improved Psychomotor Symptoms): adheres to medication regimen  Goal: Decreased Sensory Symptoms (Psychotic Signs/Symptoms)  Outcome: Progressing  Flowsheets (Taken 6/17/2024 1341)  Mutually Determined Action Steps (Decreased Sensory Symptoms): adheres to medication regimen  Goal: Improved Sleep (Psychotic Signs/Symptoms)  Outcome: Progressing  Goal: Enhanced Social, Occupational or Functional Skills (Psychotic Signs/Symptoms)  Outcome: Progressing

## 2024-06-18 VITALS
HEART RATE: 65 BPM | OXYGEN SATURATION: 98 % | SYSTOLIC BLOOD PRESSURE: 128 MMHG | RESPIRATION RATE: 18 BRPM | DIASTOLIC BLOOD PRESSURE: 79 MMHG | WEIGHT: 127.88 LBS | HEIGHT: 65 IN | TEMPERATURE: 98 F | BODY MASS INDEX: 21.31 KG/M2

## 2024-06-18 PROCEDURE — 25000003 PHARM REV CODE 250: Performed by: PSYCHIATRY & NEUROLOGY

## 2024-06-18 RX ADMIN — OLANZAPINE 5 MG: 5 TABLET, FILM COATED ORAL at 08:06

## 2024-06-18 NOTE — NURSING
"Daily Nursing Note:      Behavior:    Patient (Jurgen eBrg is a 28 y.o. male, : 1995, MRN: [id number]) demonstrating an affect that was flat and anxious. Jurgen demonstrating mood that is anxious. Jurgen had an appearance that was clean. Jurgen denies suicidal ideation. Jurgen denies suicide plan. Jurgen denies homicidal ideation. Jurgen denies hallucinations.    Jurgen's  height is 5' 5" (1.651 m) and weight is 58 kg (127 lb 13.9 oz). His temperature is 98 °F (36.7 °C). His blood pressure is 117/73 and his pulse is 73. His respiration is 18 and oxygen saturation is 96%.     Blancas last BM was noted on: _______      Intervention:    Encourage Jurgen to perform self-hygiene, grooming, and changing of clothing. Monitor Jurgen's behavior and program compliance. Monitor Jurgen for suicidal ideation, homicidal ideation, sleep disturbance, and hallucinations. Encourage Jurgen to eat all portions of meals and assess for meal preferences. Monitor Jurgen for intake and output to ensure hydration. Notify the Physician/Physician Assistant/Advance Practice Registered Nurse (MD/PA/APRN) for any medication refusal and any change in patient condition.      Response:    Jurgen verbalizes understand of unit process and procedures. Jurgen reported medications ______.      Plan:     Continue to monitor per MD/PA/APRN orders; maintain patient safety.   "

## 2024-06-18 NOTE — CARE UPDATE
Transportation has been set up via [facility]. Patient is going to [facility] [address], [place]

## 2024-06-18 NOTE — PLAN OF CARE
Jurgen met reported treatment goals of Meditation and relaxation techniques.  CTRS Discharge Recommendations:  Encouraged Pt. to actively utilize available community resources to increase leisure involvement to decrease signs and symptoms of illness.  Encouraged Pt. to utilize coping skills on a regular basis to reduce the risk of decomposition and re-hospitalization.

## 2024-06-18 NOTE — PROGRESS NOTES
"6/18/2024  Jurgen Berg   1995   [id number]        Psychiatry Progress Note     Chief Complaint: "Fantastic"    SUBJECTIVE:   Jurgen Berg is a 28 y.o. male placed under a PEC at [facility] after his coworker called [employer] due to patient's rapid speech and erratic.     States that today he is feeling "much better and blessed". States that his godfather passed away yesterday but he appears to be handling this well. States that he will be with his family today. Tolerating current medication regimen without issues.  No SI/HI or manic symptoms present today.  No acute complaints. Will continue with current plan and proceed with discharge.    UDS: (+)benzodiazepines, cannabis  Blood alcohol: <10    Current Medications:   Scheduled Meds:    OLANZapine  5 mg Oral BID      PRN Meds:   Current Facility-Administered Medications:     acetaminophen, 650 mg, Oral, Q6H PRN    aluminum-magnesium hydroxide-simethicone, 30 mL, Oral, Q6H PRN    haloperidoL, 10 mg, Oral, Q4H PRN **AND** diphenhydrAMINE, 50 mg, Oral, Q4H PRN **AND** LORazepam, 2 mg, Oral, Q4H PRN **AND** haloperidol lactate, 10 mg, Intramuscular, Q4H PRN **AND** diphenhydrAMINE, 50 mg, Intramuscular, Q4H PRN **AND** lorazepam, 2 mg, Intramuscular, Q4H PRN    hydrOXYzine HCL, 50 mg, Oral, Q4H PRN    magnesium hydroxide 400 mg/5 ml, 30 mL, Oral, Daily PRN    ondansetron, 4 mg, Oral, Q6H PRN    traZODone, 100 mg, Oral, Nightly PRN   Psychotherapeutics (From admission, onward)      Start     Stop Route Frequency Ordered    06/12/24 0915  OLANZapine tablet 5 mg         -- Oral 2 times daily 06/12/24 0908 06/11/24 1943  traZODone tablet 100 mg         -- Oral Nightly PRN 06/11/24 1943 06/11/24 1942  haloperidoL tablet 10 mg  (Med - Acute  Behavioral Management)        Placed in "And" Linked Group    -- Oral Every 4 hours PRN 06/11/24 1942 06/11/24 1942  LORazepam tablet 2 mg  (Med - Acute  Behavioral Management)        Placed in "And" Linked Group    -- Oral Every 4 hours " "PRN 06/11/24 1942    06/11/24 1942  haloperidol lactate injection 10 mg  (Med - Acute  Behavioral Management)        Placed in "And" Linked Group    -- IM Every 4 hours PRN 06/11/24 1942    06/11/24 1942  LORazepam injection 2 mg  (Med - Acute  Behavioral Management)        Placed in "And" Linked Group    -- IM Every 4 hours PRN 06/11/24 1942            Allergies:   Review of patient's allergies indicates:  No Known Allergies     OBJECTIVE:   Vitals   Vitals:    06/18/24 0701   BP: 128/79   Pulse: 65   Resp: 18   Temp: 97.5 °F (36.4 °C)        Labs/Imaging/Studies:   No results found for this or any previous visit (from the past 36 hour(s)).       Medical Review Of Systems:  Constitutional: negative  Respiratory: negative  Cardiovascular: negative  Gastrointestinal: negative  Genitourinary:negative  Musculoskeletal:negative  Neurological: negative       Psychiatric Mental Status Exam:  General Appearance: appears stated age, well-developed, well-nourished  Arousal: alert  Behavior: cooperative  Movements and Motor Activity: no abnormal involuntary movements noted  Orientation: oriented to person, place, time, and situation  Speech: normal rate, normal rhythm, normal volume, normal tone  Mood: "Ok"  Affect: constricted  Thought Process: linear  Associations: intact  Thought Content and Perceptions: no suicidal ideation, no homicidal ideation, no auditory hallucinations, no visual hallucinations, no paranoid ideation, no ideas of reference  Recent and Remote Memory: recent memory intact, remote memory intact; per interview/observation with patient  Attention and Concentration: intact, attentive to conversation; per interview/observation with patient  Fund of Knowledge: intact, aware of current events, vocabulary appropriate; based on history, vocabulary, fund of knowledge, syntax, grammar, and content  Insight: questionable; based on understanding of severity of illness and HPI  Judgment: questionable; based on " patient's behavior and HPI     ASSESSMENT/PLAN:   Problems Addressed/Diagnoses:  Bipolar Disorder, most recent episode manic, severe, with psychosis (F31.2)  Cannabis use disorder (F12.20)    Past Medical History:   Diagnosis Date    Bipolar disorder         Plan:  Bipolar disorder, chronic with acute exacerbation  -Continue Zyprexa 5mg BID     Cannabis use, chronic with acute exacerbation  -Group/Individual psychotherapy       Expected Disposition Plan: Home tomorrow        Marco Antonio Claudio, CRISTINAP-BC

## 2024-06-18 NOTE — PLAN OF CARE
Problem: Violence Risk or Actual  Goal: Anger and Impulse Control  Outcome: Met     Problem: Psychotic Signs/Symptoms  Goal: Improved Behavioral Control (Psychotic Signs/Symptoms)  Outcome: Met  Goal: Optimal Cognitive Function (Psychotic Signs/Symptoms)  Outcome: Met  Goal: Increased Participation and Engagement (Psychotic Signs/Symptoms)  Outcome: Met  Goal: Improved Mood Symptoms (Psychotic Signs/Symptoms)  Outcome: Met  Goal: Improved Psychomotor Symptoms (Psychotic Signs/Symptoms)  Outcome: Met  Goal: Decreased Sensory Symptoms (Psychotic Signs/Symptoms)  Outcome: Met  Goal: Improved Sleep (Psychotic Signs/Symptoms)  Outcome: Met  Goal: Enhanced Social, Occupational or Functional Skills (Psychotic Signs/Symptoms)  Outcome: Met

## 2024-06-18 NOTE — NURSING
Discharge Note:    Jurgen Berg is a 28 y.o. male, : 1995, MRN: [id number], admitted on 2024 for Elfego Booth MD with a diagnosis of Psychosis [F29].    Patient discharged on 2024 per physician orders in stable condition. Patient denied suicidal ideation, homicidal ideation, or hallucinations. Patient was discharged with valuables, personal belongings, prescriptions, discharge instructions, and an educational handout explaining the diagnosis and prescribed medications. Patient verbalized understanding of the discharge instructions and importance of follow-up visits. Patient was escorted out of the facility by [facility] and placed into a private vehicle to be transported to home.     Patient discharged on the following medications:     Medication List        START taking these medications      OLANZapine 5 MG tablet  Commonly known as: ZyPREXA  Take 1 tablet (5 mg total) by mouth 2 (two) times a day.            STOP taking these medications      ARIPiprazole 20 MG Tab  Commonly known as: ABILIFY               Where to Get Your Medications        You can get these medications from any pharmacy    Bring a paper prescription for each of these medications  OLANZapine 5 MG tablet

## 2024-06-18 NOTE — NURSING
PRN MED       Patient requested prn Trazodone for sleep. Trazodone 100mg given @2007 . Will reassess

## 2024-06-19 NOTE — DISCHARGE SUMMARY
"DISCHARGE SUMMARY  PSYCHIATRY      Admit Date: 6/11/2024  7:30 PM    Discharge Date:  6/18/2024    SITE:   [facility]    Discharge Attending Physician: Elfego Booth M.D.    Chief Complaint:  "Police picked me up"     History of Present Illness On Admit:   Jurgen Berg is a 28 y.o. male placed under a PEC at [facility] after his coworker called [employer] due to patient's rapid speech and erratic.     Patient states that he was at work and the police came and arrested him.  He was last here in September of last year.  He was on Abilify 20mg daily.  He admits that he has not been taking any medication and has not followed up with any mental health services.  States that he does not need these.     Rambles.  Grandiose.  Somewhat irritable.  Will start on Zyprexa here and will start a forced medication protocol if needed due to acuity of current symptoms.     UDS: (+)benzodiazepines, cannabis  Blood alcohol: <10      Admit Mental Status Exam:  General Appearance: appears stated age, well-developed, well-nourished  Arousal: alert  Behavior: somewhat uncooperative  Movements and Motor Activity: no abnormal involuntary movements noted  Orientation: oriented to person, place, time, and situation  Speech: normal rate, normal rhythm, normal volume, normal tone  Mood: "Ok"  Affect: constricted  Thought Process: linear  Associations: fair  Thought Content and Perceptions: grandiose, hyper-Anglican, paranoid, no suicidal ideation, no homicidal ideation  Recent and Remote Memory: recent memory intact, remote memory intact; per interview/observation with patient  Attention and Concentration: intact, attentive to conversation; per interview/observation with patient  Fund of Knowledge: intact, aware of current events, vocabulary appropriate; based on history, vocabulary, fund of knowledge, syntax, grammar, and content  Insight: impaired; based on understanding of severity of illness and " "HPI  Judgment: impaired; based on patient's behavior and HPI      Diagnoses:  PRINCIPAL PROBLEM:  Bipolar I, most recent episode manic, severe with psychotic behavior      PROBLEM LIST    Bipolar I, most recent episode manic, severe with psychotic behavior    Cannabis dependence, continuous        Hospital Course:   Patient was admitted to [facility] and started on Zyprexa.    6/13/24  Patient was elevated and hyper [place] today. He was redirectable and polite during this exam. He states that he found God through his father that passed away about three years ago. He states that his mood is great. He is tolerating the medication well without issue. He stated that he does not want to experience the psychosis again and knows now how important his medication is. Will continue with current POC and monitor for need to augment.       6/14/24  Today patient states that he is feeling thankful. Much less elevated. He was calm and polite during this exam. He states that his mood is great. He is tolerating the medication well without issue. States that he is sleeping and eating well. He endorsed feeling like his normal self again. Will continue with current POC and monitor for need to augment.       6/17/24  States that the weekend was "good and mellow."  Does report appetite improving.  Has been sleeping appropriately.  Lives with his mother and brother in [place].  Tolerating current medication regimen without issues.  Much less elevated than on admit.  No acute complaints.  Will plan for discharge soon.       6/18/24  States that today he is feeling "much better and blessed". States that his godfather passed away yesterday but he appears to be handling this well. States that he will be with his family today. Tolerating current medication regimen without issues.  No SI/HI or manic symptoms present today.  No acute complaints. Will continue with current plan and proceed with discharge       Current Medications:   Scheduled " "Meds:        DISCHARGE EXAMINATION    VITALS   Vitals:    06/16/24 0715 06/16/24 1730 06/17/24 0701 06/18/24 0701   BP:  116/76 117/73 128/79   BP Location:    Right arm   Patient Position:    Lying   Pulse:  67 73 65   Resp:   18 18   Temp: 98.6 °F (37 °C) 98.6 °F (37 °C) 98 °F (36.7 °C) 97.5 °F (36.4 °C)   TempSrc:    Oral   SpO2:  100% 96% 98%   Weight:       Height:             Discharge Mental Status Exam:  General Appearance: appears stated age, well-developed, well-nourished  Arousal: alert  Behavior: cooperative  Movements and Motor Activity: no abnormal involuntary movements noted  Orientation: oriented to person, place, time, and situation  Speech: normal rate, normal rhythm, normal volume, normal tone  Mood: "Ok"  Affect: constricted  Thought Process: linear  Associations: intact  Thought Content and Perceptions: no suicidal ideation, no homicidal ideation, no auditory hallucinations, no visual hallucinations, no paranoid ideation, no ideas of reference  Recent and Remote Memory: recent memory intact, remote memory intact; per interview/observation with patient  Attention and Concentration: intact, attentive to conversation; per interview/observation with patient  Fund of Knowledge: intact, aware of current events, vocabulary appropriate; based on history, vocabulary, fund of knowledge, syntax, grammar, and content  Insight: questionable; based on understanding of severity of illness and HPI  Judgment: questionable; based on patient's behavior and HPI       Discharge Condition:  Stable    Prognosis:  Fair    Justification for multiple antipsychotics:  N/a    Disposition:  discharged to home    Follow-up:   Follow-up Information       [facility]rist[facility] Figueroa [facility] - Follow up.    Contact information:  [address] [facility] [address] [phone]               [facility] Follow up.    Specialties: Behavioral Health, Psychiatry, Psychology  Why: Pt will utilize walk-in " services Mon-Thursday 8-2 and Friday 8-10.   Please bring your id, medicaid card, and discharge papers with you for your appointment.  Contact information:  [place] [address] [phone]                             Medication Regimen:  No current facility-administered medications for this encounter.    Current Outpatient Medications:     OLANZapine (ZYPREXA) 5 MG tablet, Take 1 tablet (5 mg total) by mouth 2 (two) times a day., Disp: 60 tablet, Rfl: 0      Patient Instructions:   Continue medication regimen as prescribed.    Disposition plan per  - see  notes for details.    Patient instructed to call 911 or present to emergency department if any of the following complications develop status post discharge: suicidality, homicidality, or grave disability.     Total time spent discharging patient: <30 minutes      Elfego Booth M.D.

## 2024-08-24 ENCOUNTER — HOSPITAL ENCOUNTER (EMERGENCY)
Facility: HOSPITAL | Age: 29
Discharge: PSYCHIATRIC OR SUBSTANCE USE FACILITY | End: 2024-08-24
Attending: EMERGENCY MEDICINE
Payer: MEDICAID

## 2024-08-24 ENCOUNTER — HOSPITAL ENCOUNTER (INPATIENT)
Facility: HOSPITAL | Age: 29
LOS: 6 days | Discharge: HOME | DRG: 885 | End: 2024-08-30
Attending: PSYCHIATRY & NEUROLOGY | Admitting: PSYCHIATRY & NEUROLOGY
Payer: MEDICAID

## 2024-08-24 VITALS
HEART RATE: 76 BPM | WEIGHT: 140 LBS | DIASTOLIC BLOOD PRESSURE: 72 MMHG | SYSTOLIC BLOOD PRESSURE: 112 MMHG | RESPIRATION RATE: 19 BRPM | TEMPERATURE: 97 F | BODY MASS INDEX: 21.22 KG/M2 | OXYGEN SATURATION: 95 % | HEIGHT: 68 IN

## 2024-08-24 DIAGNOSIS — E87.6 HYPOKALEMIA: ICD-10-CM

## 2024-08-24 DIAGNOSIS — F30.9 MANIA: Primary | ICD-10-CM

## 2024-08-24 DIAGNOSIS — F19.10 SUBSTANCE ABUSE: ICD-10-CM

## 2024-08-24 DIAGNOSIS — R00.0 TACHYCARDIA: ICD-10-CM

## 2024-08-24 DIAGNOSIS — F31.9 BIPOLAR 1 DISORDER: ICD-10-CM

## 2024-08-24 LAB
ALBUMIN SERPL-MCNC: 4.1 G/DL (ref 3.5–5)
ALBUMIN/GLOB SERPL: 1.4 RATIO (ref 1.1–2)
ALP SERPL-CCNC: 77 UNIT/L (ref 40–150)
ALT SERPL-CCNC: 23 UNIT/L (ref 0–55)
AMPHET UR QL SCN: NEGATIVE
ANION GAP SERPL CALC-SCNC: 13 MEQ/L
APAP SERPL-MCNC: <10 UG/ML (ref 10–30)
AST SERPL-CCNC: 18 UNIT/L (ref 5–34)
BARBITURATE SCN PRESENT UR: NEGATIVE
BASOPHILS # BLD AUTO: 0.01 X10(3)/MCL
BASOPHILS NFR BLD AUTO: 0.1 %
BENZODIAZ UR QL SCN: NEGATIVE
BILIRUB SERPL-MCNC: 0.6 MG/DL
BILIRUB UR QL STRIP.AUTO: NEGATIVE
BUN SERPL-MCNC: 15.5 MG/DL (ref 8.9–20.6)
CALCIUM SERPL-MCNC: 9.2 MG/DL (ref 8.4–10.2)
CANNABINOIDS UR QL SCN: POSITIVE
CHLORIDE SERPL-SCNC: 106 MMOL/L (ref 98–107)
CK SERPL-CCNC: 272 U/L (ref 30–200)
CLARITY UR: CLEAR
CO2 SERPL-SCNC: 20 MMOL/L (ref 22–29)
COCAINE UR QL SCN: NEGATIVE
COLOR UR AUTO: ABNORMAL
CREAT SERPL-MCNC: 1.28 MG/DL (ref 0.73–1.18)
CREAT/UREA NIT SERPL: 12
EOSINOPHIL # BLD AUTO: 0.02 X10(3)/MCL (ref 0–0.9)
EOSINOPHIL NFR BLD AUTO: 0.2 %
ERYTHROCYTE [DISTWIDTH] IN BLOOD BY AUTOMATED COUNT: 12.1 % (ref 11.5–17)
ETHANOL SERPL-MCNC: <10 MG/DL
FENTANYL UR QL SCN: NEGATIVE
GFR SERPLBLD CREATININE-BSD FMLA CKD-EPI: >60 ML/MIN/1.73/M2
GLOBULIN SER-MCNC: 3 GM/DL (ref 2.4–3.5)
GLUCOSE SERPL-MCNC: 239 MG/DL (ref 74–100)
GLUCOSE UR QL STRIP: 100
HCT VFR BLD AUTO: 44.4 % (ref 42–52)
HGB BLD-MCNC: 15.7 G/DL (ref 14–18)
HGB UR QL STRIP: NEGATIVE
IMM GRANULOCYTES # BLD AUTO: 0.05 X10(3)/MCL (ref 0–0.04)
IMM GRANULOCYTES NFR BLD AUTO: 0.6 %
KETONES UR QL STRIP: NEGATIVE
LEUKOCYTE ESTERASE UR QL STRIP: NEGATIVE
LYMPHOCYTES # BLD AUTO: 3.19 X10(3)/MCL (ref 0.6–4.6)
LYMPHOCYTES NFR BLD AUTO: 37.4 %
MCH RBC QN AUTO: 30.7 PG (ref 27–31)
MCHC RBC AUTO-ENTMCNC: 35.4 G/DL (ref 33–36)
MCV RBC AUTO: 86.7 FL (ref 80–94)
MDMA UR QL SCN: NEGATIVE
MONOCYTES # BLD AUTO: 0.52 X10(3)/MCL (ref 0.1–1.3)
MONOCYTES NFR BLD AUTO: 6.1 %
NEUTROPHILS # BLD AUTO: 4.73 X10(3)/MCL (ref 2.1–9.2)
NEUTROPHILS NFR BLD AUTO: 55.6 %
NITRITE UR QL STRIP: NEGATIVE
NRBC BLD AUTO-RTO: 0 %
OPIATES UR QL SCN: NEGATIVE
PCP UR QL: NEGATIVE
PH UR STRIP: 6.5 [PH]
PH UR: 6.5 [PH] (ref 3–11)
PLATELET # BLD AUTO: 247 X10(3)/MCL (ref 130–400)
PMV BLD AUTO: 10.5 FL (ref 7.4–10.4)
POTASSIUM SERPL-SCNC: 3.1 MMOL/L (ref 3.5–5.1)
PROT SERPL-MCNC: 7.1 GM/DL (ref 6.4–8.3)
PROT UR QL STRIP: NEGATIVE
RBC # BLD AUTO: 5.12 X10(6)/MCL (ref 4.7–6.1)
SODIUM SERPL-SCNC: 139 MMOL/L (ref 136–145)
SP GR UR STRIP.AUTO: 1.01 (ref 1–1.03)
SPECIFIC GRAVITY, URINE AUTO (.000) (OHS): 1.01 (ref 1–1.03)
TROPONIN I SERPL-MCNC: <0.01 NG/ML (ref 0–0.04)
TSH SERPL-ACNC: 0.83 UIU/ML (ref 0.35–4.94)
UROBILINOGEN UR STRIP-ACNC: 0.2
WBC # BLD AUTO: 8.52 X10(3)/MCL (ref 4.5–11.5)

## 2024-08-24 PROCEDURE — 63600175 PHARM REV CODE 636 W HCPCS: Performed by: EMERGENCY MEDICINE

## 2024-08-24 PROCEDURE — 84443 ASSAY THYROID STIM HORMONE: CPT | Performed by: EMERGENCY MEDICINE

## 2024-08-24 PROCEDURE — 80143 DRUG ASSAY ACETAMINOPHEN: CPT | Performed by: EMERGENCY MEDICINE

## 2024-08-24 PROCEDURE — 82550 ASSAY OF CK (CPK): CPT | Performed by: EMERGENCY MEDICINE

## 2024-08-24 PROCEDURE — 80053 COMPREHEN METABOLIC PANEL: CPT | Performed by: EMERGENCY MEDICINE

## 2024-08-24 PROCEDURE — 96375 TX/PRO/DX INJ NEW DRUG ADDON: CPT

## 2024-08-24 PROCEDURE — 25000003 PHARM REV CODE 250: Performed by: EMERGENCY MEDICINE

## 2024-08-24 PROCEDURE — 82077 ASSAY SPEC XCP UR&BREATH IA: CPT | Performed by: EMERGENCY MEDICINE

## 2024-08-24 PROCEDURE — 80307 DRUG TEST PRSMV CHEM ANLYZR: CPT | Performed by: EMERGENCY MEDICINE

## 2024-08-24 PROCEDURE — 11400000 HC PSYCH PRIVATE ROOM

## 2024-08-24 PROCEDURE — 93005 ELECTROCARDIOGRAM TRACING: CPT

## 2024-08-24 PROCEDURE — 85025 COMPLETE CBC W/AUTO DIFF WBC: CPT | Performed by: EMERGENCY MEDICINE

## 2024-08-24 PROCEDURE — 81003 URINALYSIS AUTO W/O SCOPE: CPT | Performed by: EMERGENCY MEDICINE

## 2024-08-24 PROCEDURE — 96361 HYDRATE IV INFUSION ADD-ON: CPT

## 2024-08-24 PROCEDURE — 99285 EMERGENCY DEPT VISIT HI MDM: CPT | Mod: 25

## 2024-08-24 PROCEDURE — 93010 ELECTROCARDIOGRAM REPORT: CPT | Mod: ,,, | Performed by: INTERNAL MEDICINE

## 2024-08-24 PROCEDURE — 96365 THER/PROPH/DIAG IV INF INIT: CPT

## 2024-08-24 PROCEDURE — 84484 ASSAY OF TROPONIN QUANT: CPT | Performed by: EMERGENCY MEDICINE

## 2024-08-24 RX ORDER — LORAZEPAM 2 MG/ML
2 INJECTION INTRAMUSCULAR
Status: DISCONTINUED | OUTPATIENT
Start: 2024-08-24 | End: 2024-08-24

## 2024-08-24 RX ORDER — LORAZEPAM 2 MG/ML
1 INJECTION INTRAMUSCULAR
Status: COMPLETED | OUTPATIENT
Start: 2024-08-24 | End: 2024-08-24

## 2024-08-24 RX ORDER — IBUPROFEN 200 MG
1 TABLET ORAL DAILY
Status: DISCONTINUED | OUTPATIENT
Start: 2024-08-25 | End: 2024-08-30 | Stop reason: HOSPADM

## 2024-08-24 RX ORDER — HYDROXYZINE HYDROCHLORIDE 50 MG/1
50 TABLET, FILM COATED ORAL EVERY 4 HOURS PRN
Status: DISCONTINUED | OUTPATIENT
Start: 2024-08-24 | End: 2024-08-30 | Stop reason: HOSPADM

## 2024-08-24 RX ORDER — DIPHENHYDRAMINE HYDROCHLORIDE 50 MG/ML
50 INJECTION INTRAMUSCULAR; INTRAVENOUS EVERY 4 HOURS PRN
Status: DISCONTINUED | OUTPATIENT
Start: 2024-08-24 | End: 2024-08-30 | Stop reason: HOSPADM

## 2024-08-24 RX ORDER — POTASSIUM CHLORIDE 7.45 MG/ML
10 INJECTION INTRAVENOUS
Status: COMPLETED | OUTPATIENT
Start: 2024-08-24 | End: 2024-08-24

## 2024-08-24 RX ORDER — DIPHENHYDRAMINE HCL 50 MG
50 CAPSULE ORAL EVERY 4 HOURS PRN
Status: DISCONTINUED | OUTPATIENT
Start: 2024-08-24 | End: 2024-08-30 | Stop reason: HOSPADM

## 2024-08-24 RX ORDER — MUPIROCIN 20 MG/G
OINTMENT TOPICAL 2 TIMES DAILY
Status: DISCONTINUED | OUTPATIENT
Start: 2024-08-24 | End: 2024-08-24

## 2024-08-24 RX ORDER — ALUMINUM HYDROXIDE, MAGNESIUM HYDROXIDE, AND SIMETHICONE 1200; 120; 1200 MG/30ML; MG/30ML; MG/30ML
30 SUSPENSION ORAL EVERY 6 HOURS PRN
Status: DISCONTINUED | OUTPATIENT
Start: 2024-08-24 | End: 2024-08-30 | Stop reason: HOSPADM

## 2024-08-24 RX ORDER — DIPHENHYDRAMINE HYDROCHLORIDE 50 MG/ML
25 INJECTION INTRAMUSCULAR; INTRAVENOUS
Status: COMPLETED | OUTPATIENT
Start: 2024-08-24 | End: 2024-08-24

## 2024-08-24 RX ORDER — SODIUM CHLORIDE 9 MG/ML
1000 INJECTION, SOLUTION INTRAVENOUS
Status: COMPLETED | OUTPATIENT
Start: 2024-08-24 | End: 2024-08-24

## 2024-08-24 RX ORDER — LORAZEPAM 1 MG/1
2 TABLET ORAL EVERY 4 HOURS PRN
Status: DISCONTINUED | OUTPATIENT
Start: 2024-08-24 | End: 2024-08-30 | Stop reason: HOSPADM

## 2024-08-24 RX ORDER — TRAZODONE HYDROCHLORIDE 100 MG/1
100 TABLET ORAL NIGHTLY PRN
Status: DISCONTINUED | OUTPATIENT
Start: 2024-08-24 | End: 2024-08-25

## 2024-08-24 RX ORDER — ACETAMINOPHEN 325 MG/1
650 TABLET ORAL EVERY 6 HOURS PRN
Status: DISCONTINUED | OUTPATIENT
Start: 2024-08-24 | End: 2024-08-30 | Stop reason: HOSPADM

## 2024-08-24 RX ORDER — HALOPERIDOL 5 MG/ML
10 INJECTION INTRAMUSCULAR EVERY 4 HOURS PRN
Status: DISCONTINUED | OUTPATIENT
Start: 2024-08-24 | End: 2024-08-30 | Stop reason: HOSPADM

## 2024-08-24 RX ORDER — HALOPERIDOL 5 MG/1
10 TABLET ORAL EVERY 4 HOURS PRN
Status: DISCONTINUED | OUTPATIENT
Start: 2024-08-24 | End: 2024-08-30 | Stop reason: HOSPADM

## 2024-08-24 RX ORDER — LORAZEPAM 2 MG/ML
2 INJECTION INTRAMUSCULAR EVERY 4 HOURS PRN
Status: DISCONTINUED | OUTPATIENT
Start: 2024-08-24 | End: 2024-08-30 | Stop reason: HOSPADM

## 2024-08-24 RX ADMIN — POTASSIUM CHLORIDE 10 MEQ: 7.46 INJECTION, SOLUTION INTRAVENOUS at 05:08

## 2024-08-24 RX ADMIN — LORAZEPAM 1 MG: 2 INJECTION INTRAMUSCULAR; INTRAVENOUS at 03:08

## 2024-08-24 RX ADMIN — SODIUM CHLORIDE 1000 ML: 9 INJECTION, SOLUTION INTRAVENOUS at 04:08

## 2024-08-24 RX ADMIN — DIPHENHYDRAMINE HYDROCHLORIDE 25 MG: 50 INJECTION INTRAMUSCULAR; INTRAVENOUS at 03:08

## 2024-08-24 RX ADMIN — SODIUM CHLORIDE 1000 ML: 9 INJECTION, SOLUTION INTRAVENOUS at 05:08

## 2024-08-24 NOTE — ED PROVIDER NOTES
Encounter Date: 8/24/2024       History     Chief Complaint   Patient presents with    Psychiatric Evaluation     Family called ems for psych; hx of bipolar; manic on scene; states that he smoked spice pta; denies HI, denies SI; received 5mg droperidol IM with ems      29-year-old male with a history of bipolar disorder became manic today after smoking marijuana, and the family called 911. His sister states that she believes he hasn't slept much in the last week, staying up all night possibly smoking marijuana because family hears him coughing in his room.  Paramedics report that on arrival patient was manic, speaking rapidly, very agitated but not physically aggressive.  Heart rate was in the 170s and was pouring sweat.  They gave droperidol 5 mg IM and he has calmed down and heart rate has decreased to 150.    The history is provided by the patient and a relative.     Review of patient's allergies indicates:  No Known Allergies  Past Medical History:   Diagnosis Date    Bipolar disorder      History reviewed. No pertinent surgical history.  Family History   Problem Relation Name Age of Onset    Heart disease Father      Hypertension Father      Diabetes Father       Social History     Tobacco Use    Smoking status: Former     Types: Vaping with nicotine     Start date: 2008   Substance Use Topics    Alcohol use: Not Currently    Drug use: Yes     Types: Marijuana, Benzodiazepines     Review of Systems   Psychiatric/Behavioral:  Positive for agitation. The patient is hyperactive.    All other systems reviewed and are negative.      Physical Exam     Initial Vitals [08/24/24 1511]   BP Pulse Resp Temp SpO2   (!) 175/85 (!) 154 18 99.9 °F (37.7 °C) 98 %      MAP       --         Physical Exam    Nursing note and vitals reviewed.  Constitutional: Vital signs are normal. He appears well-developed and well-nourished.   HENT:   Head: Normocephalic and atraumatic.   Mouth/Throat: Oropharynx is clear and moist.   Eyes:  Pupils are equal, round, and reactive to light.   Neck: Neck supple. No JVD present.   Cardiovascular:  Regular rhythm and normal heart sounds.           tachycardia   Pulmonary/Chest: Breath sounds normal. No respiratory distress.   Abdominal: Abdomen is soft. There is no abdominal tenderness.   Musculoskeletal:         General: No edema.      Cervical back: Neck supple. No edema or erythema.     Lymphadenopathy:     He has no cervical adenopathy.   Neurological: He is alert and oriented to person, place, and time. No cranial nerve deficit. GCS score is 15. GCS eye subscore is 4. GCS verbal subscore is 5. GCS motor subscore is 6.   Skin: Skin is warm. Capillary refill takes less than 2 seconds.   diaphoretic   Psychiatric:   Unable to assess in the emergency room as patient was given droperidol prior to arrival.  He is following instructions, recognizes family, able to assistant changing clothes.         ED Course   Procedures  Labs Reviewed   CK - Abnormal       Result Value    Creatine Kinase 272 (*)    COMPREHENSIVE METABOLIC PANEL - Abnormal    Sodium 139      Potassium 3.1 (*)     Chloride 106      CO2 20 (*)     Glucose 239 (*)     Blood Urea Nitrogen 15.5      Creatinine 1.28 (*)     Calcium 9.2      Protein Total 7.1      Albumin 4.1      Globulin 3.0      Albumin/Globulin Ratio 1.4      Bilirubin Total 0.6      ALP 77      ALT 23      AST 18      eGFR >60      Anion Gap 13.0      BUN/Creatinine Ratio 12     URINALYSIS, REFLEX TO URINE CULTURE - Abnormal    Color, UA Straw      Appearance, UA Clear      Specific Gravity, UA 1.010      pH, UA 6.5      Protein, UA Negative      Glucose,  (*)     Ketones, UA Negative      Blood, UA Negative      Bilirubin, UA Negative      Urobilinogen, UA 0.2      Nitrites, UA Negative      Leukocyte Esterase, UA Negative     DRUG SCREEN, URINE (BEAKER) - Abnormal    Amphetamines, Urine Negative      Barbiturates, Urine Negative      Benzodiazepine, Urine Negative       Cannabinoids, Urine Positive (*)     Cocaine, Urine Negative      Fentanyl, Urine Negative      MDMA, Urine Negative      Opiates, Urine Negative      Phencyclidine, Urine Negative      pH, Urine 6.5      Specific Gravity, Urine Auto 1.010      Narrative:     Cut off concentrations:    Amphetamines - 1000 ng/ml  Barbiturates - 200 ng/ml  Benzodiazepine - 200 ng/ml  Cannabinoids (THC) - 50 ng/ml  Cocaine - 300 ng/ml  Fentanyl - 1.0 ng/ml  MDMA - 500 ng/ml  Opiates - 300 ng/ml   Phencyclidine (PCP) - 25 ng/ml    Specimen submitted for drug analysis and tested for pH and specific gravity in order to evaluate sample integrity. Suspect tampering if specific gravity is <1.003 and/or pH is not within the range of 4.5 - 8.0  False negatives may result form substances such as bleach added to urine.  False positives may result for the presence of a substance with similar chemical structure to the drug or its metabolite.    This test provides only a PRELIMINARY analytical test result. A more specific alternate chemical method must be used in order to obtain a confirmed analytical result. Gas chromatography/mass spectrometry (GC/MS) is the preferred confirmatory method. Other chemical confirmation methods are available. Clinical consideration and professional judgement should be applied to any drug of abuse test result, particularly when preliminary positive results are used.    Positive results will be confirmed only at the physicians request. Unconfirmed screening results are to be used only for medical purposes (treatment).        ACETAMINOPHEN LEVEL - Abnormal    Acetaminophen Level <10.0 (*)    CBC WITH DIFFERENTIAL - Abnormal    WBC 8.52      RBC 5.12      Hgb 15.7      Hct 44.4      MCV 86.7      MCH 30.7      MCHC 35.4      RDW 12.1      Platelet 247      MPV 10.5 (*)     Neut % 55.6      Lymph % 37.4      Mono % 6.1      Eos % 0.2      Basophil % 0.1      Lymph # 3.19      Neut # 4.73      Mono # 0.52      Eos # 0.02       Baso # 0.01      IG# 0.05 (*)     IG% 0.6      NRBC% 0.0     TSH - Normal    TSH 0.834     ALCOHOL,MEDICAL (ETHANOL) - Normal    Ethanol Level <10.0     TROPONIN I - Normal    Troponin-I <0.010     CBC W/ AUTO DIFFERENTIAL    Narrative:     The following orders were created for panel order CBC auto differential.  Procedure                               Abnormality         Status                     ---------                               -----------         ------                     CBC with Differential[[id number]]       Abnormal            Final result                 Please view results for these tests on the individual orders.     EKG Readings: (Independently Interpreted)   Initial Reading: No STEMI. Rhythm: Sinus Tachycardia. Heart Rate: 111. ST Segments: Normal ST Segments. T Waves: Normal. Clinical Impression: Normal Sinus Rhythm and Sinus Tachycardia     ECG Results              EKG 12-lead (In process)        Collection Time Result Time QRS Duration OHS QTC Calculation    08/24/24 15:59:33 08/24/24 17:46:20 88 456                     In process by Interface, Lab In [facility] (08/24/24 17:46:29)                   Narrative:    Test Reason : R00.0,    Vent. Rate : 111 BPM     Atrial Rate : 111 BPM     P-R Int : 152 ms          QRS Dur : 088 ms      QT Int : 336 ms       P-R-T Axes : 067 064 055 degrees     QTc Int : 456 ms    Sinus tachycardia  Otherwise normal ECG  When compared with ECG of 11-JUN-2024 16:25,  Vent. rate has increased BY  47 BPM    Referred By: [email or web address]   SELF           Confirmed By:                                   Imaging Results    None          Medications   diphenhydrAMINE injection 25 mg (25 mg Intravenous Given 8/24/24 1527)   LORazepam injection 1 mg (1 mg Intravenous Given 8/24/24 1527)   0.9%  NaCl infusion (0 mLs Intravenous Stopped 8/24/24 1719)   potassium chloride 10 mEq in 100 mL IVPB (0 mEq Intravenous Stopped 8/24/24 1822)   0.9%  NaCl infusion (0 mLs Intravenous Stopped  8/24/24 1823)     Medical Decision Making  See HPI for narrative    Differential diagnosis includes but is not limited to psychiatric disorder, substance abuse, cardiac arrhythmia, rhabdomyolysis, dehydration, electrolyte abnormality    Amount and/or Complexity of Data Reviewed  Labs: ordered. Decision-making details documented in ED Course.    Risk  Prescription drug management.               ED Course as of 08/24/24 1853   Sat Aug 24, 2024   1510 Patient is still somewhat agitated, hyperactive, trying to climb out of the bed and leaves the room.  Will order Ativan 1 mg IV and Benadryl 25 mg IV. [SH]   1626 Patient has been sleeping, O2 sat 98% on room air, heart rate 105, blood pressure 109/71.  We were able to wake him up to try to get a urine sample but he was not able to urinate. [SH]   1708 CPK(!): 272 [SH]   1708 Potassium(!): 3.1  Mildly decreased potassium.  Will give a dose IV. [SH]   1709 Patient is sleeping, arousable, still not able to urinate.  IV fluids in progress. [SH]   1849 Medically cleared for psychiatric placement [SH]      ED Course User Index  [SH] Vy Bowling MD       Medically cleared for psychiatry placement: 8/24/2024  6:49 PM                   Clinical Impression:  Final diagnoses:  [F30.9] Anny (Primary)  [F19.10] Substance abuse  [R00.0] Tachycardia  [E87.6] Hypokalemia          ED Disposition Condition    Transfer to Psych Facility Stable          ED Prescriptions    None       Follow-up Information    None          Vy Bowling MD  08/24/24 1853

## 2024-08-25 LAB
OHS QRS DURATION: 88 MS
OHS QTC CALCULATION: 456 MS

## 2024-08-25 PROCEDURE — 25000003 PHARM REV CODE 250: Performed by: PSYCHIATRY & NEUROLOGY

## 2024-08-25 PROCEDURE — 11400000 HC PSYCH PRIVATE ROOM

## 2024-08-25 PROCEDURE — 25000003 PHARM REV CODE 250: Performed by: NURSE PRACTITIONER

## 2024-08-25 RX ORDER — OLANZAPINE 5 MG/1
5 TABLET ORAL 2 TIMES DAILY
Status: DISCONTINUED | OUTPATIENT
Start: 2024-08-25 | End: 2024-08-28

## 2024-08-25 RX ORDER — TRAZODONE HYDROCHLORIDE 50 MG/1
50 TABLET ORAL NIGHTLY
Status: DISCONTINUED | OUTPATIENT
Start: 2024-08-25 | End: 2024-08-30 | Stop reason: HOSPADM

## 2024-08-25 RX ADMIN — HYDROXYZINE HYDROCHLORIDE 50 MG: 50 TABLET, FILM COATED ORAL at 04:08

## 2024-08-25 RX ADMIN — TRAZODONE HYDROCHLORIDE 50 MG: 50 TABLET ORAL at 08:08

## 2024-08-25 RX ADMIN — HYDROXYZINE HYDROCHLORIDE 50 MG: 50 TABLET, FILM COATED ORAL at 08:08

## 2024-08-25 RX ADMIN — OLANZAPINE 5 MG: 5 TABLET, FILM COATED ORAL at 08:08

## 2024-08-25 NOTE — PROGRESS NOTES
08/25/24 1000   [facility] Group Therapy   Group Name Mental Awareness   Specific Interventions Remotivation   Participation Level Minimal   Participation Quality Needs Redirection   Insight/Motivation Limited   Affect/Mood Display Anxious   Cognition Alert   Psychomotor WNL

## 2024-08-25 NOTE — NURSING
Patient alert and oriented x 4 ,ambulatory with steady gait.Observed patient as manic ,talks to fast during conversation.Claimed positive for anxiety but tolerable per statement. Denies SI and HI, Denies  auditory and visual hallucination.accepting with medication and group therapy .

## 2024-08-25 NOTE — PLAN OF CARE
Problem: Adult Behavioral Health Plan of Care  Goal: Plan of Care Review  Outcome: Not Progressing  Goal: Patient-Specific Goal (Individualization)  Outcome: Not Progressing  Goal: Adheres to Safety Considerations for Self and Others  Outcome: Not Progressing  Goal: Absence of New-Onset Illness or Injury  Outcome: Not Progressing  Goal: Optimized Coping Skills in Response to Life Stressors  Outcome: Not Progressing  Goal: Develops/Participates in Therapeutic [place] to Support Successful Transition  Outcome: Not Progressing  Goal: Rounds/Family Conference  Outcome: Not Progressing     Problem: Violence Risk or Actual  Goal: Anger and Impulse Control  Outcome: Not Progressing     Problem: Excessive Substance Use  Goal: Optimized Energy Level (Excessive Substance Use)  Outcome: Not Progressing  Goal: Improved Behavioral Control (Excessive Substance Use)  Outcome: Not Progressing  Goal: Increased Participation and Engagement (Excessive Substance Use)  Outcome: Not Progressing  Goal: Improved Physiologic Symptoms (Excessive Substance Use)  Outcome: Not Progressing  Goal: Enhanced Social, Occupational or Functional Skills (Excessive Substance Use)  Outcome: Not Progressing     Problem: Psychotic Signs/Symptoms  Goal: Improved Behavioral Control (Psychotic Signs/Symptoms)  Outcome: Not Progressing  Goal: Optimal Cognitive Function (Psychotic Signs/Symptoms)  Outcome: Not Progressing  Goal: Increased Participation and Engagement (Psychotic Signs/Symptoms)  Outcome: Not Progressing  Goal: Improved Mood Symptoms (Psychotic Signs/Symptoms)  Outcome: Not Progressing  Goal: Improved Psychomotor Symptoms (Psychotic Signs/Symptoms)  Outcome: Not Progressing  Goal: Decreased Sensory Symptoms (Psychotic Signs/Symptoms)  Outcome: Not Progressing  Goal: Improved Sleep (Psychotic Signs/Symptoms)  Outcome: Not Progressing  Goal: Enhanced Social, Occupational or Functional Skills (Psychotic Signs/Symptoms)  Outcome: Not Progressing      Admitted to [facility] from [facility] with complaints of manic episode after smoking spice. Hx: bipolar disorder. He was brought to the ER by ambulance, EMTs report that he was rambling and screaming on the scene and he was give Droperidol 5 mg IM before arriving to the hospital. Pt was still agitated and manic in the ER , trying to run through the ER and was difficult to redirect and was given Benadryl 25 mg and Ativan 1 mg in ER. Presents calm and cooperative. Flat affect. He is drowsy and continues falling asleep during assessment. He is rambling while he was awake. He states that he smoked some flowers and he became very scared and thought that he was in hell. His speech is difficult to understand at times due to the drowsiness. He was last admitted here in June 2024. He states that he has been compliant with his Zyprexa. Denies any further medical or psychiatric history. He reports poor sleep and appetite for the last week. Denies physical or sexual abuse. He resides with his parents and states that he feels safe there. No agitation or aggression noted at this time. Consents signed and pt escorted to his room.

## 2024-08-25 NOTE — NURSING
At 0459 pt c/o anxiety. Administered PRN atarax at this time.     Pt resting in bed with eyes closed at 0600. No signs of distress noted at this time.

## 2024-08-25 NOTE — PLAN OF CARE
Problem: Adult Behavioral Health Plan of Care  Goal: Plan of Care Review  Outcome: Progressing  Flowsheets (Taken 8/25/2024 1314)  Consent Given to Review Plan with: review plan for admissionand discahrge  Patient Agreement with Plan of Care: agrees  Plan of Care Reviewed With: patient  Goal: Patient-Specific Goal (Individualization)  Outcome: Progressing  Flowsheets (Taken 8/25/2024 1314)  Patient Personal Strengths: ability to maintain sobriety  Patient Vulnerabilities: substance abuse/addiction  Anxieties, Fears or Concerns: anxiety  Goal: Adheres to Safety Considerations for Self and Others  Outcome: Progressing  Goal: Absence of New-Onset Illness or Injury  Outcome: Progressing  Goal: Optimized Coping Skills in Response to Life Stressors  Outcome: Progressing  Goal: Develops/Participates in Therapeutic [place] to Support Successful Transition  Outcome: Progressing  Goal: Rounds/Family Conference  Outcome: Progressing     Problem: Violence Risk or Actual  Goal: Anger and Impulse Control  Outcome: Progressing  Intervention: Minimize Safety Risk  Flowsheets (Taken 8/25/2024 1314)  Behavior Management: behavioral plan developed  Sensory Stimulation Regulation: quiet environment promoted  De-Escalation Techniques: medication offered     Problem: Excessive Substance Use  Goal: Optimized Energy Level (Excessive Substance Use)  Outcome: Progressing  Goal: Improved Behavioral Control (Excessive Substance Use)  Outcome: Progressing  Goal: Increased Participation and Engagement (Excessive Substance Use)  Outcome: Progressing  Goal: Improved Physiologic Symptoms (Excessive Substance Use)  Outcome: Progressing  Goal: Enhanced Social, Occupational or Functional Skills (Excessive Substance Use)  Outcome: Progressing     Problem: Psychotic Signs/Symptoms  Goal: Improved Behavioral Control (Psychotic Signs/Symptoms)  Outcome: Progressing  Flowsheets (Taken 8/25/2024 1314)  Mutually Determined Action Steps (Improved  Behavioral Control):   identifies symptoms triggers   verbalizes gratifying activity  Goal: Optimal Cognitive Function (Psychotic Signs/Symptoms)  Outcome: Progressing  Flowsheets (Taken 8/25/2024 1314)  Mutually Determined Action Steps (Optimal Cognitive Function): participates in attention training  Goal: Increased Participation and Engagement (Psychotic Signs/Symptoms)  Outcome: Progressing  Flowsheets (Taken 8/25/2024 1314)  Mutually Determined Action Steps (Increased Participation and Engagement):   identifies symptoms triggers   identifies future-oriented goal  Goal: Improved Mood Symptoms (Psychotic Signs/Symptoms)  Outcome: Progressing  Flowsheets (Taken 8/25/2024 1314)  Mutually Determined Action Steps (Improved Mood Symptoms): acknowledges progress  Goal: Improved Psychomotor Symptoms (Psychotic Signs/Symptoms)  Outcome: Progressing  Flowsheets (Taken 8/25/2024 1314)  Mutually Determined Action Steps (Improved Psychomotor Symptoms): adheres to medication regimen  Goal: Decreased Sensory Symptoms (Psychotic Signs/Symptoms)  Outcome: Progressing  Flowsheets (Taken 8/25/2024 1314)  Mutually Determined Action Steps (Decreased Sensory Symptoms): adheres to medication regimen  Goal: Improved Sleep (Psychotic Signs/Symptoms)  Outcome: Progressing  Flowsheets (Taken 8/25/2024 1314)  Mutually Determined Action Steps (Improved Sleep): sleeps 4-6 hours at night  Goal: Enhanced Social, Occupational or Functional Skills (Psychotic Signs/Symptoms)  Outcome: Progressing  Flowsheets (Taken 8/25/2024 1314)  Mutually Determined Action Steps (Enhanced Social, Occupational or Functional Skills): identifies personal strengths

## 2024-08-25 NOTE — H&P
[facility] - Behavioral Health Unit  History & Physical    Subjective:      Chief Complaint/Reason for Admission: angelo     Jurgen Berg is a 29 y.o. male. Bipolar with extreme angelo     Past Medical History:   Diagnosis Date    Bipolar disorder     History of psychiatric hospitalization     Hx of psychiatric care     Psychiatric problem      No past surgical history on file.  Family History   Problem Relation Name Age of Onset    Heart disease Father      Hypertension Father      Diabetes Father       Social History     Tobacco Use    Smoking status: Former     Types: Vaping with nicotine     Start date: 2008   Substance Use Topics    Alcohol use: Not Currently    Drug use: Yes     Types: Marijuana       PTA Medications   Medication Sig    OLANZapine (ZYPREXA) 5 MG tablet Take 1 tablet (5 mg total) by mouth 2 (two) times a day.     Review of patient's allergies indicates:  No Known Allergies     Review of Systems   Constitutional: Negative.    HENT: Negative.     Eyes: Negative.    Respiratory: Negative.     Cardiovascular: Negative.    Gastrointestinal: Negative.    Genitourinary: Negative.    Musculoskeletal: Negative.    Skin: Negative.    Neurological: Negative.    Endo/Heme/Allergies: Negative.    Psychiatric/Behavioral:  Positive for depression and hallucinations. Negative for substance abuse and suicidal ideas. The patient is nervous/anxious.        Objective:      Vital Signs (Most Recent)  Temp: 97.7 °F (36.5 °C) (08/24/24 2200)  Pulse: 74 (08/24/24 2200)  Resp: 16 (08/24/24 2200)  BP: 118/74 (08/24/24 2200)  SpO2: 100 % (08/24/24 2200)    Vital Signs Range (Last 24H):  Temp:  [97 °F (36.1 °C)-99.9 °F (37.7 °C)]   Pulse:  []   Resp:  [12-20]   BP: (109-175)/(71-85)   SpO2:  [95 %-100 %]     Physical Exam  HENT:      Head: Normocephalic.      Right Ear: Tympanic membrane normal.      Left Ear: Tympanic membrane normal.      Nose: Nose normal.      Mouth/Throat:      Mouth: Mucous  membranes are moist.   Eyes:      Extraocular Movements: Extraocular movements intact.      Pupils: Pupils are equal, round, and reactive to light.   Cardiovascular:      Rate and Rhythm: Normal rate and regular rhythm.   Pulmonary:      Effort: Pulmonary effort is normal.   Abdominal:      General: Abdomen is flat.   Musculoskeletal:         General: Normal range of motion.   Skin:     General: Skin is warm.   Neurological:      General: No focal deficit present.      Mental Status: He is alert and oriented to person, place, and time.      Comments: Vision normal   Hearing normal   EOM intact   Face muscles normal  Facial sensation normal   Shrugs shoulders  Tongue midline            Data Review:    Recent Results (from the past 48 hour(s))   CK    Collection Time: 08/24/24  3:29 PM   Result Value Ref Range    Creatine Kinase 272 (H) 30 - 200 U/L   Comprehensive metabolic panel    Collection Time: 08/24/24  3:29 PM   Result Value Ref Range    Sodium 139 136 - 145 mmol/L    Potassium 3.1 (L) 3.5 - 5.1 mmol/L    Chloride 106 98 - 107 mmol/L    CO2 20 (L) 22 - 29 mmol/L    Glucose 239 (H) 74 - 100 mg/dL    Blood Urea Nitrogen 15.5 8.9 - 20.6 mg/dL    Creatinine 1.28 (H) 0.73 - 1.18 mg/dL    Calcium 9.2 8.4 - 10.2 mg/dL    Protein Total 7.1 6.4 - 8.3 gm/dL    Albumin 4.1 3.5 - 5.0 g/dL    Globulin 3.0 2.4 - 3.5 gm/dL    Albumin/Globulin Ratio 1.4 1.1 - 2.0 ratio    Bilirubin Total 0.6 <=1.5 mg/dL    ALP 77 40 - 150 unit/L    ALT 23 0 - 55 unit/L    AST 18 5 - 34 unit/L    eGFR >60 mL/min/1.73/m2    Anion Gap 13.0 mEq/L    BUN/Creatinine Ratio 12    TSH    Collection Time: 08/24/24  3:29 PM   Result Value Ref Range    TSH 0.834 0.350 - 4.940 uIU/mL   Ethanol    Collection Time: 08/24/24  3:29 PM   Result Value Ref Range    Ethanol Level <10.0 <=10.0 mg/dL   Acetaminophen level    Collection Time: 08/24/24  3:29 PM   Result Value Ref Range    Acetaminophen Level <10.0 (L) 10.0 - 30.0 ug/ml   CBC with Differential     Collection Time: 08/24/24  3:29 PM   Result Value Ref Range    WBC 8.52 4.50 - 11.50 x10(3)/mcL    RBC 5.12 4.70 - 6.10 x10(6)/mcL    Hgb 15.7 14.0 - 18.0 g/dL    Hct 44.4 42.0 - 52.0 %    MCV 86.7 80.0 - 94.0 fL    MCH 30.7 27.0 - 31.0 pg    MCHC 35.4 33.0 - 36.0 g/dL    RDW 12.1 11.5 - 17.0 %    Platelet 247 130 - 400 x10(3)/mcL    MPV 10.5 (H) 7.4 - 10.4 fL    Neut % 55.6 %    Lymph % 37.4 %    Mono % 6.1 %    Eos % 0.2 %    Basophil % 0.1 %    Lymph # 3.19 0.6 - 4.6 x10(3)/mcL    Neut # 4.73 2.1 - 9.2 x10(3)/mcL    Mono # 0.52 0.1 - 1.3 x10(3)/mcL    Eos # 0.02 0 - 0.9 x10(3)/mcL    Baso # 0.01 <=0.2 x10(3)/mcL    IG# 0.05 (H) 0 - 0.04 x10(3)/mcL    IG% 0.6 %    NRBC% 0.0 %   Troponin I    Collection Time: 08/24/24  3:29 PM   Result Value Ref Range    Troponin-I <0.010 0.000 - 0.045 ng/mL   EKG 12-lead    Collection Time: 08/24/24  3:59 PM   Result Value Ref Range    QRS Duration 88 ms    [facility] QTC Calculation 456 ms   Urinalysis, Reflex to Urine Culture    Collection Time: 08/24/24  6:08 PM    Specimen: Urine   Result Value Ref Range    Color, UA Straw Yellow, Light-Yellow, Dark Yellow, Theresa, Straw    Appearance, UA Clear Clear    Specific Gravity, UA 1.010 1.005 - 1.030    pH, UA 6.5 5.0 - 8.5    Protein, UA Negative Negative    Glucose,  (A) Negative, Normal    Ketones, UA Negative Negative    Blood, UA Negative Negative    Bilirubin, UA Negative Negative    Urobilinogen, UA 0.2 0.2, 1.0, Normal    Nitrites, UA Negative Negative    Leukocyte Esterase, UA Negative Negative   Drug Screen, Urine    Collection Time: 08/24/24  6:08 PM   Result Value Ref Range    Amphetamines, Urine Negative Negative    Barbiturates, Urine Negative Negative    Benzodiazepine, Urine Negative Negative    Cannabinoids, Urine Positive (A) Negative    Cocaine, Urine Negative Negative    Fentanyl, Urine Negative Negative    MDMA, Urine Negative Negative    Opiates, Urine Negative Negative    Phencyclidine, Urine Negative Negative     pH, Urine 6.5 3.0 - 11.0    Specific Gravity, Urine Auto 1.010 1.001 - 1.035        No results found.       Assessment and Plan       Bipolar with angelo

## 2024-08-26 LAB
ALBUMIN SERPL-MCNC: 4 G/DL (ref 3.5–5)
ALBUMIN/GLOB SERPL: 1.4 RATIO (ref 1.1–2)
ALP SERPL-CCNC: 69 UNIT/L (ref 40–150)
ALT SERPL-CCNC: 25 UNIT/L (ref 0–55)
ANION GAP SERPL CALC-SCNC: 9 MEQ/L
AST SERPL-CCNC: 23 UNIT/L (ref 5–34)
BILIRUB SERPL-MCNC: 0.9 MG/DL
BUN SERPL-MCNC: 13 MG/DL (ref 8.9–20.6)
CALCIUM SERPL-MCNC: 9.2 MG/DL (ref 8.4–10.2)
CHLORIDE SERPL-SCNC: 107 MMOL/L (ref 98–107)
CHOLEST SERPL-MCNC: 141 MG/DL
CHOLEST/HDLC SERPL: 4 {RATIO} (ref 0–5)
CO2 SERPL-SCNC: 24 MMOL/L (ref 22–29)
CREAT SERPL-MCNC: 1.01 MG/DL (ref 0.73–1.18)
CREAT/UREA NIT SERPL: 13
EST. AVERAGE GLUCOSE BLD GHB EST-MCNC: 99.7 MG/DL
GFR SERPLBLD CREATININE-BSD FMLA CKD-EPI: >60 ML/MIN/1.73/M2
GLOBULIN SER-MCNC: 2.9 GM/DL (ref 2.4–3.5)
GLUCOSE SERPL-MCNC: 90 MG/DL (ref 74–100)
HBA1C MFR BLD: 5.1 %
HDLC SERPL-MCNC: 39 MG/DL (ref 35–60)
LDLC SERPL CALC-MCNC: 89 MG/DL (ref 50–140)
POTASSIUM SERPL-SCNC: 4.6 MMOL/L (ref 3.5–5.1)
PROT SERPL-MCNC: 6.9 GM/DL (ref 6.4–8.3)
SODIUM SERPL-SCNC: 140 MMOL/L (ref 136–145)
T PALLIDUM AB SER QL: NONREACTIVE
TRIGL SERPL-MCNC: 65 MG/DL (ref 34–140)
TSH SERPL-ACNC: 1.65 UIU/ML (ref 0.35–4.94)
VLDLC SERPL CALC-MCNC: 13 MG/DL

## 2024-08-26 PROCEDURE — 84443 ASSAY THYROID STIM HORMONE: CPT | Performed by: PSYCHIATRY & NEUROLOGY

## 2024-08-26 PROCEDURE — 83036 HEMOGLOBIN GLYCOSYLATED A1C: CPT | Performed by: PSYCHIATRY & NEUROLOGY

## 2024-08-26 PROCEDURE — 86780 TREPONEMA PALLIDUM: CPT | Performed by: PSYCHIATRY & NEUROLOGY

## 2024-08-26 PROCEDURE — 25000003 PHARM REV CODE 250: Performed by: NURSE PRACTITIONER

## 2024-08-26 PROCEDURE — 11400000 HC PSYCH PRIVATE ROOM

## 2024-08-26 PROCEDURE — 80061 LIPID PANEL: CPT | Performed by: PSYCHIATRY & NEUROLOGY

## 2024-08-26 PROCEDURE — 80053 COMPREHEN METABOLIC PANEL: CPT | Performed by: PSYCHIATRY & NEUROLOGY

## 2024-08-26 PROCEDURE — 36415 COLL VENOUS BLD VENIPUNCTURE: CPT | Performed by: PSYCHIATRY & NEUROLOGY

## 2024-08-26 RX ADMIN — OLANZAPINE 5 MG: 5 TABLET, FILM COATED ORAL at 08:08

## 2024-08-26 RX ADMIN — TRAZODONE HYDROCHLORIDE 50 MG: 50 TABLET ORAL at 08:08

## 2024-08-26 NOTE — PROGRESS NOTES
08/26/24 1000   [facility] Group Therapy   Group Name Therapeutic Recreation   Specific Interventions Skilled Activity Mild Exercises   Participation Level None   Participation Quality Conflict w/ Other;Services

## 2024-08-26 NOTE — PLAN OF CARE
Patient alert and oriented x 4 ,ambulatory with steady gait.Observed patient as manic ,talks to fast during conversation.Claimed positive for anxiety but tolerable per statement. Denies SI and HI, Denies  auditory and visual hallucination.accepting with medication and group therapy .        Patient found standing in day room while peers play cards, mood elevated but polite to staff. Accepting of scheduled medications. Endorses anxiety at present and requested and received PRN Atarax. Asking about Medicaid, accepted nurse answer of discussing issue with  tomorrow. Denies delusions of grandiosity at this time.     Problem: Adult Behavioral Health Plan of Care  Goal: Plan of Care Review  Outcome: Progressing  Goal: Patient-Specific Goal (Individualization)  Outcome: Progressing  Goal: Adheres to Safety Considerations for Self and Others  Outcome: Progressing  Goal: Absence of New-Onset Illness or Injury  Outcome: Progressing  Goal: Optimized Coping Skills in Response to Life Stressors  Outcome: Progressing  Goal: Develops/Participates in Therapeutic [place] to Support Successful Transition  Outcome: Progressing  Goal: Rounds/Family Conference  Outcome: Progressing     Problem: Violence Risk or Actual  Goal: Anger and Impulse Control  Outcome: Progressing     Problem: Excessive Substance Use  Goal: Optimized Energy Level (Excessive Substance Use)  Outcome: Progressing  Goal: Improved Behavioral Control (Excessive Substance Use)  Outcome: Progressing  Goal: Increased Participation and Engagement (Excessive Substance Use)  Outcome: Progressing  Goal: Improved Physiologic Symptoms (Excessive Substance Use)  Outcome: Progressing  Goal: Enhanced Social, Occupational or Functional Skills (Excessive Substance Use)  Outcome: Progressing     Problem: Psychotic Signs/Symptoms  Goal: Improved Behavioral Control (Psychotic Signs/Symptoms)  Outcome: Progressing  Goal: Optimal Cognitive Function (Psychotic  Signs/Symptoms)  Outcome: Progressing  Goal: Increased Participation and Engagement (Psychotic Signs/Symptoms)  Outcome: Progressing  Goal: Improved Mood Symptoms (Psychotic Signs/Symptoms)  Outcome: Progressing  Goal: Improved Psychomotor Symptoms (Psychotic Signs/Symptoms)  Outcome: Progressing  Goal: Decreased Sensory Symptoms (Psychotic Signs/Symptoms)  Outcome: Progressing  Goal: Improved Sleep (Psychotic Signs/Symptoms)  Outcome: Progressing  Goal: Enhanced Social, Occupational or Functional Skills (Psychotic Signs/Symptoms)  Outcome: Progressing

## 2024-08-26 NOTE — PSYCH EVALUATION
"Subjective:  "I'm doing all right. I just started believing I was God."    Patient is a 29 y.o. male seen for evaluation. A/M, feels like he is in the hospital because he created earth, sounds, and creation. Has been feeling like this "a couple weeks ago" and feels like this is his reality. Says he can go on and on forever about what his role is in the earth and creation. Says he is still a virgin and hasn't kissed anyone, "I'm single by the way." Lives with mother/brother/sister. Mother is a caretaker from 2 younger children that pt "treats like my children". Feels like he on "factory reset again and again". Feels lightyears away from everyone else. Says he has been inpt at [facility] "so many times, too many to count". Yesterday felt like he was "in hell and wanted to commit suicide, my body is all combined with all this pain and emotion, that's why they strapped me up". "The world is such a bad place, I know it is!" Decreased sleep and appetite. Works at [employer]) "I have to pay my bills". 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 Says he has only been "taking medicine at night, but my sister said I was supposed to be taking it in the night and in the morning". Says he ignored all the other medication he has been prescribed "all those bottles I stopped taking I ignored it because it was all for the depression and I'm not depressed". Has a dream to be an actor. Smokes marijuana "every night", and told staff "I hope you can protect the buds because it's the only thing I can trust". Biological father . Has been arrested "many times cus I couldn't control myself". UDS (+) THC, ETOH negative.   Home Meds: Zyprexa    Patient Active Problem List    Diagnosis Date Noted    Bipolar I, most recent episode manic, severe with psychotic behavior 2024    Cannabis dependence, continuous 2023    Bipolar 1 disorder     Psychosis     Fatigue 2019    Paranoia " 07/10/2019     Past Medical History:   Diagnosis Date    Bipolar disorder     History of psychiatric hospitalization     Hx of psychiatric care     Psychiatric problem       No past surgical history on file.   Medications Prior to Admission   Medication Sig Dispense Refill Last Dose    OLANZapine (ZYPREXA) 5 MG tablet Take 1 tablet (5 mg total) by mouth 2 (two) times a day. 60 tablet 0      Review of patient's allergies indicates:  No Known Allergies   Social History     Tobacco Use    Smoking status: Former     Types: Vaping with nicotine     Start date: 2008    Smokeless tobacco: Not on file   Substance Use Topics    Alcohol use: Not Currently      Family History   Problem Relation Name Age of Onset    Heart disease Father      Hypertension Father      Diabetes Father        Psychiatric Review Of Systems:  sleep: yes  appetite changes: yes  weight changes: yes  energy/anergy: yes  interest/pleasure/anhedonia: yes  somatic symptoms: yes  libido: no  anxiety/panic: yes  guilty/hopeless: no  S.I.B.s/risky behavior: yes  any drugs: yes  alcohol: no       Objective:  Vital signs:  Temp:  [97 °F (36.1 °C)-97.7 °F (36.5 °C)] 97.7 °F (36.5 °C)  Pulse:  [74-76] 74  Resp:  [16-19] 16  SpO2:  [95 %-100 %] 100 %  BP: (112-118)/(72-74) 118/74        Mental Status Evaluation:  Appearance:  Casual, purple hospital scrubs   Behavior:  cooperative, psychomotor agitation, redirectable, restless and fidgety , eye contact normal   Speech:  loud, pressured   Mood:  anxious   Affect:  euphoric, increased in intensity, mood-incongruent, anxious   Thought Process:  blocked, flight of ideas, loose associations, racing, illogical   Thought Content:  delusions: Yes, obsessions: Yes, suicidal thoughts: (active-Yes, passive-Yes), paranoid   Sensorium:  person, place   Cognition:  fund of knowledge 3 of 4 recent presidents and memory > able to remember recent events- Yes, able to remember remote events- No   Insight:  poor   Judgment:  poor      Assessment/Plan:  Axis I: Depressive Disorder NOS, Psychotic Disorder NOS, and Cannabis abuse, r/o substance induced psychosis , suicidal ideations  Axis II: Deferred  Axis III:   Past Medical History:   Diagnosis Date    Bipolar disorder     History of psychiatric hospitalization     Hx of psychiatric care     Psychiatric problem      Axis IV: occupational problems, other psychosocial or environmental problems, problems related to social environment, problems with access to health care services, and problems with primary support group  Axis V: 0 Inadequate information

## 2024-08-26 NOTE — PROGRESS NOTES
08/26/24 1500   [facility] Group Therapy   Group Name Therapeutic Recreation   Specific Interventions Skilled Activity Leisure Education and Awareness   Participation Level None   Participation Quality Refused  (despite encouragement)

## 2024-08-26 NOTE — PROGRESS NOTES
Juan is a 30y/o male admitted for Bipolar Disorder, most recent episode manic, severe, with psychosis (F31.2) and Cannabis use disorder (F12.20) with a uds +cannabis. CTRS met with Pt 1:1 at bedside, Juan was pleasant and cooperative appearing manic AEB elevated mood, expansive, hyper verbal with pressured speech, FOI and BLADIMIR, reporting ability to perform his ADL's. CTRS educated Pt to TR group times and dates with Jurgen agreeing to attend and participate in TR groups. Jurgen reported his treatment goal as Coping skills and anything else you want to teach me.     08/26/24 0831   General   Admit Date 08/24/24   Primary Diagnosis Bipolar Disorder, most recent episode manic, severe, with psychosis (F31.2)   Secondary Diagnosis Cannabis use disorder (F12.20)   Rastafarian spiritual   Number of Children 0   Children Living? 0   Occupation cook   Does the patient have dentures? No   If you were to take part in activities, which of the following would you prefer? Both   Do you feel like you have enough to keep you busy now? Yes   Do you believe that you have the opportunity for physical activity? Yes   Activity Capabilities Maximum   Subjective   Patient states I found God, I found my Dad   Assessment   Mobility ambulates independently   Transfers independently   Musculoskeletal   (none)   Visual Acuity normal vision   Visual Perception depth perception;color perception;recognizes letters;recognizes numbers   Hearing normal   Speech/Communication normal   Cognitive Concerns disoriented to;situation;concentration;attention span   Emotional Concerns   (appears manic)   Leisure Interest Survey   Leisure Interest Survey Yes   Social/Group Activities   Methodist/Restorationist Current Interest   Solitary Activities   Watching TV Current Interest   Watching Videos Current Interest   Music Listening Current Interest   Reading Current Interest   Physical Activities   Dancing Current Interest   Fitness/Exercise Programs Current  Interest   Weightlifting Current Interest   Walk/Run Current Interest   Creative Activities   Creative Writing Current Interest   Singing Current Interest   Cooking Current Interest   Passive Games   Classic Board Games Current Interest   Goals   Additional Documentation yes   Goal Formulation With patient   Time For Goal Achievement 7 days   Goal 1 Coping skills and anything else you want to teach me   Goal 1-Progress ongoing-   Plan   Planned Therapy Intervention Group Recreational Therapy   Expected Length of Stay 5-7days   PT Frequency Minimum of 3 visits per week

## 2024-08-26 NOTE — PROGRESS NOTES
"8/26/2024  Jurgen Berg   1995   [id number]        Psychiatry Progress Note     Chief Complaint: "Better"    SUBJECTIVE:   Jurgen Berg is a 29 y.o. male placed under a PEC at [facility] due to acute angelo.  He also admitted to using synthetic cannabinoids.    Today, he is calmer and states that he is feeling better.  States that it is possible that he used synthetic cannabinoids but did not know if he did.  Tolerating current medication regimen without issues.  Less elevated.  Has been catching up on sleep.  Will continue current treatment plan and will monitor for the need to augment.     UDS: (+) cannabis  Blood alcohol: <10    Current Medications:   Scheduled Meds:    nicotine  1 patch Transdermal Daily    OLANZapine  5 mg Oral BID    traZODone  50 mg Oral QHS      PRN Meds:   Current Facility-Administered Medications:     acetaminophen, 650 mg, Oral, Q6H PRN    aluminum-magnesium hydroxide-simethicone, 30 mL, Oral, Q6H PRN    haloperidoL, 10 mg, Oral, Q4H PRN **AND** diphenhydrAMINE, 50 mg, Oral, Q4H PRN **AND** LORazepam, 2 mg, Oral, Q4H PRN **AND** haloperidol lactate, 10 mg, Intramuscular, Q4H PRN **AND** diphenhydrAMINE, 50 mg, Intramuscular, Q4H PRN **AND** lorazepam, 2 mg, Intramuscular, Q4H PRN    hydrOXYzine HCL, 50 mg, Oral, Q4H PRN   Psychotherapeutics (From admission, onward)      Start     Stop Route Frequency Ordered    08/25/24 2100  OLANZapine tablet 5 mg         -- Oral 2 times daily 08/25/24 1909 08/25/24 2100  traZODone tablet 50 mg         -- Oral Nightly 08/25/24 1909 08/24/24 2152  haloperidoL tablet 10 mg  (Med - Acute  Behavioral Management)        Placed in "And" Linked Group    -- Oral Every 4 hours PRN 08/24/24 2152 08/24/24 2152  LORazepam tablet 2 mg  (Med - Acute  Behavioral Management)        Placed in "And" Linked Group    -- Oral Every 4 hours PRN 08/24/24 2152 08/24/24 2152  haloperidol lactate injection 10 mg  (Med - Acute  Behavioral Management)        Placed in " ""And" Linked Group    -- IM Every 4 hours PRN 08/24/24 2152 08/24/24 2152  LORazepam injection 2 mg  (Med - Acute  Behavioral Management)        Placed in "And" Linked Group    -- IM Every 4 hours PRN 08/24/24 2152            Allergies:   Review of patient's allergies indicates:  No Known Allergies     OBJECTIVE:   Vitals   Vitals:    08/25/24 1915   BP: 128/70   Pulse: 75   Resp: 18   Temp: 98.1 °F (36.7 °C)        Labs/Imaging/Studies:   Recent Results (from the past 36 hour(s))   Hemoglobin A1C    Collection Time: 08/26/24  6:32 AM   Result Value Ref Range    Hemoglobin A1c 5.1 <=7.0 %    Estimated Average Glucose 99.7 mg/dL   Comprehensive Metabolic Panel    Collection Time: 08/26/24  6:32 AM   Result Value Ref Range    Sodium 140 136 - 145 mmol/L    Potassium 4.6 3.5 - 5.1 mmol/L    Chloride 107 98 - 107 mmol/L    CO2 24 22 - 29 mmol/L    Glucose 90 74 - 100 mg/dL    Blood Urea Nitrogen 13.0 8.9 - 20.6 mg/dL    Creatinine 1.01 0.73 - 1.18 mg/dL    Calcium 9.2 8.4 - 10.2 mg/dL    Protein Total 6.9 6.4 - 8.3 gm/dL    Albumin 4.0 3.5 - 5.0 g/dL    Globulin 2.9 2.4 - 3.5 gm/dL    Albumin/Globulin Ratio 1.4 1.1 - 2.0 ratio    Bilirubin Total 0.9 <=1.5 mg/dL    ALP 69 40 - 150 unit/L    ALT 25 0 - 55 unit/L    AST 23 5 - 34 unit/L    eGFR >60 mL/min/1.73/m2    Anion Gap 9.0 mEq/L    BUN/Creatinine Ratio 13    TSH    Collection Time: 08/26/24  6:32 AM   Result Value Ref Range    TSH 1.646 0.350 - 4.940 uIU/mL   Lipid Panel    Collection Time: 08/26/24  6:32 AM   Result Value Ref Range    Cholesterol Total 141 <=200 mg/dL    HDL Cholesterol 39 35 - 60 mg/dL    Triglyceride 65 34 - 140 mg/dL    Cholesterol/HDL Ratio 4 0 - 5    Very Low Density Lipoprotein 13     LDL Cholesterol 89.00 50.00 - 140.00 mg/dL          Medical Review Of Systems:  Constitutional: negative  Respiratory: negative  Cardiovascular: negative  Gastrointestinal: negative  Genitourinary:negative  Musculoskeletal:negative  Neurological: negative " "      Psychiatric Mental Status Exam:  General Appearance: appears stated age, well-developed, well-nourished  Arousal: alert  Behavior: cooperative  Movements and Motor Activity: no abnormal involuntary movements noted  Orientation: oriented to person, place, time, and situation  Speech: normal rate, normal rhythm, normal volume, normal tone  Mood: "Ok"  Affect: constricted  Thought Process: linear  Associations: intact  Thought Content and Perceptions: no suicidal ideation, no homicidal ideation, no auditory hallucinations, no visual hallucinations, no paranoid ideation, no ideas of reference  Recent and Remote Memory: recent memory intact, remote memory intact; per interview/observation with patient  Attention and Concentration: intact, attentive to conversation; per interview/observation with patient  Fund of Knowledge: intact, aware of current events, vocabulary appropriate; based on history, vocabulary, fund of knowledge, syntax, grammar, and content  Insight: questionable; based on understanding of severity of illness and HPI  Judgment: questionable; based on patient's behavior and HPI     ASSESSMENT/PLAN:   Problems Addressed/Diagnoses:  Bipolar Disorder, most recent episode manic, severe, with psychosis (F31.2)  Cannabis use disorder (F12.20)    Past Medical History:   Diagnosis Date    Bipolar disorder     History of psychiatric hospitalization     Hx of psychiatric care     Psychiatric problem         Plan:  Bipolar disorder, chronic with acute exacerbation  -Continue Zyprexa 5mg BID  -Continue trazodone     Cannabis use, chronic with acute exacerbation  -Group/Individual psychotherapy       Expected Disposition Plan: Home tomorrow        Elfego Booth M.D.  "

## 2024-08-26 NOTE — NURSING
Pt c/o anxiety at 2024, administered PRN atarax at this time.     Pt noted to be resting quietly in bed with eyes closed at 2200.

## 2024-08-27 PROCEDURE — 11400000 HC PSYCH PRIVATE ROOM

## 2024-08-27 PROCEDURE — 25000003 PHARM REV CODE 250: Performed by: NURSE PRACTITIONER

## 2024-08-27 RX ADMIN — TRAZODONE HYDROCHLORIDE 50 MG: 50 TABLET ORAL at 08:08

## 2024-08-27 RX ADMIN — OLANZAPINE 5 MG: 5 TABLET, FILM COATED ORAL at 08:08

## 2024-08-27 RX ADMIN — OLANZAPINE 5 MG: 5 TABLET, FILM COATED ORAL at 09:08

## 2024-08-27 NOTE — PLAN OF CARE
Problem: Excessive Substance Use  Goal: Optimized Energy Level (Excessive Substance Use)  Outcome: Progressing  Flowsheets (Taken 8/27/2024 1005)  Mutually Determined Action Steps (Optimized Energy Level): other (see comments)  Intervention: Optimize Energy Level  Flowsheets (Taken 8/27/2024 1005)  Activity (Behavioral Health): up ad anisa  Patient Performed Hygiene: other (see comments)  Diversional Activity: other (see comments)  Goal: Improved Behavioral Control (Excessive Substance Use)  Outcome: Progressing  Flowsheets (Taken 8/27/2024 1005)  Mutually Determined Action Steps (Improved Behavioral Control): other (see comments)  Intervention: Promote Behavior and Impulse Control  Flowsheets (Taken 8/27/2024 1005)  Behavior Management: other (see comments)  Goal: Increased Participation and Engagement (Excessive Substance Use)  Outcome: Progressing  Flowsheets (Taken 8/27/2024 1005)  Mutually Determined Action Steps (Increased Participation and Engagement): other (see comments)  Intervention: Facilitate Participation and Engagement  Flowsheets (Taken 8/27/2024 1005)  Supportive Measures: self-care encouraged  Diversional Activity: other (see comments)  Goal: Improved Physiologic Symptoms (Excessive Substance Use)  Outcome: Progressing  Flowsheets (Taken 8/27/2024 1005)  Mutually Determined Action Steps (Improved Physiologic Symptoms): verbalizes physical symptoms       AAOx4. Unkempt appearance. Speech with normal rate and tone. Steady gait. Flat affect, anxious and depressed mood. Denies suicidal ideations. Denies homicidal ideation, He is having intermittent auditory hallucination, that he does not elaborate on .   Interacting well with staff and peers. Attended group today. Fair eye contact. States that she is eating and sleeping well. Compliant with medications. No agitation or aggression noted. Continue with plan of care and q 15 minute safety checks.

## 2024-08-27 NOTE — PLAN OF CARE
"  Problem: Adult Behavioral Health Plan of Care  Goal: Plan of Care Review  Outcome: Progressing  Goal: Patient-Specific Goal (Individualization)  Outcome: Progressing  Goal: Adheres to Safety Considerations for Self and Others  Outcome: Progressing  Goal: Absence of New-Onset Illness or Injury  Outcome: Progressing  Goal: Optimized Coping Skills in Response to Life Stressors  Outcome: Progressing  Goal: Develops/Participates in Therapeutic [place] to Support Successful Transition  Outcome: Progressing  Goal: Rounds/Family Conference  Outcome: Progressing     Problem: Violence Risk or Actual  Goal: Anger and Impulse Control  Outcome: Progressing     Problem: Excessive Substance Use  Goal: Optimized Energy Level (Excessive Substance Use)  Outcome: Progressing  Goal: Improved Behavioral Control (Excessive Substance Use)  Outcome: Progressing  Goal: Increased Participation and Engagement (Excessive Substance Use)  Outcome: Progressing  Goal: Improved Physiologic Symptoms (Excessive Substance Use)  Outcome: Progressing  Goal: Enhanced Social, Occupational or Functional Skills (Excessive Substance Use)  Outcome: Progressing     Problem: Psychotic Signs/Symptoms  Goal: Improved Behavioral Control (Psychotic Signs/Symptoms)  Outcome: Progressing  Goal: Optimal Cognitive Function (Psychotic Signs/Symptoms)  Outcome: Progressing  Goal: Increased Participation and Engagement (Psychotic Signs/Symptoms)  Outcome: Progressing  Goal: Improved Mood Symptoms (Psychotic Signs/Symptoms)  Outcome: Progressing  Goal: Improved Psychomotor Symptoms (Psychotic Signs/Symptoms)  Outcome: Progressing  Goal: Decreased Sensory Symptoms (Psychotic Signs/Symptoms)  Outcome: Progressing  Goal: Improved Sleep (Psychotic Signs/Symptoms)  Outcome: Progressing  Goal: Enhanced Social, Occupational or Functional Skills (Psychotic Signs/Symptoms)  Outcome: Progressing    He is AAO X 4. Patient expresses AH of his dad speaking to him. He states " He " "tells me to live life to the fullest and be hopeful." He currently denies SI and HI. Patient states he has anxiety but its natural anxiety. Stated all is fine now he will remain on his medications this time. Continue plan of care and provide a safe and therapeutic environment. Continue to monitor every fifteen minutes for safety.   "

## 2024-08-27 NOTE — PROGRESS NOTES
08/27/24 [address] [place] [address] Group Therapy   Group Name Therapeutic Recreation   Specific Interventions Guided Imagery/Relaxation   Participation Level Active;Supportive;Appropriate;Attentive;Sharing   Participation Quality Cooperative;Social   Insight/Motivation Limited;Applies New Skills   Affect/Mood Display Appropriate;Bright;Elevated   Cognition Alert   Psychomotor WNL

## 2024-08-27 NOTE — PROGRESS NOTES
"8/27/2024  Jurgen Berg   1995   [id number]        Psychiatry Progress Note     Chief Complaint: "Better"    SUBJECTIVE:   Jurgen Berg is a 29 y.o. male placed under a PEC at [facility] due to acute angelo.  He also admitted to using synthetic cannabinoids.    Today, he is calm and states that he is feeling blessed. States that he does not know why he stops taking his medication because every time he restarts them he feels better.  Tolerating current medication regimen without issues.  Less elevated.  Has been sleeping well. Denies any SI/HI or manic symptoms.  Will continue current treatment plan and will monitor for the need to augment.     UDS: (+) cannabis  Blood alcohol: <10    Current Medications:   Scheduled Meds:    nicotine  1 patch Transdermal Daily    OLANZapine  5 mg Oral BID    traZODone  50 mg Oral QHS      PRN Meds:   Current Facility-Administered Medications:     acetaminophen, 650 mg, Oral, Q6H PRN    aluminum-magnesium hydroxide-simethicone, 30 mL, Oral, Q6H PRN    haloperidoL, 10 mg, Oral, Q4H PRN **AND** diphenhydrAMINE, 50 mg, Oral, Q4H PRN **AND** LORazepam, 2 mg, Oral, Q4H PRN **AND** haloperidol lactate, 10 mg, Intramuscular, Q4H PRN **AND** diphenhydrAMINE, 50 mg, Intramuscular, Q4H PRN **AND** lorazepam, 2 mg, Intramuscular, Q4H PRN    hydrOXYzine HCL, 50 mg, Oral, Q4H PRN   Psychotherapeutics (From admission, onward)      Start     Stop Route Frequency Ordered    08/25/24 2100  OLANZapine tablet 5 mg         -- Oral 2 times daily 08/25/24 1909 08/25/24 2100  traZODone tablet 50 mg         -- Oral Nightly 08/25/24 1909 08/24/24 2152  haloperidoL tablet 10 mg  (Med - Acute  Behavioral Management)        Placed in "And" Linked Group    -- Oral Every 4 hours PRN 08/24/24 2152 08/24/24 2152  LORazepam tablet 2 mg  (Med - Acute  Behavioral Management)        Placed in "And" Linked Group    -- Oral Every 4 hours PRN 08/24/24 2152 08/24/24 2152  haloperidol lactate injection 10 mg  " "(Med - Acute  Behavioral Management)        Placed in "And" Linked Group    -- IM Every 4 hours PRN 08/24/24 2152 08/24/24 2152  LORazepam injection 2 mg  (Med - Acute  Behavioral Management)        Placed in "And" Linked Group    -- IM Every 4 hours PRN 08/24/24 2152            Allergies:   Review of patient's allergies indicates:  No Known Allergies     OBJECTIVE:   Vitals   Vitals:    08/26/24 1915   BP: (!) 136/94   Pulse: 67   Resp: 18   Temp: 98.2 °F (36.8 °C)        Labs/Imaging/Studies:   Recent Results (from the past 36 hour(s))   Hemoglobin A1C    Collection Time: 08/26/24  6:32 AM   Result Value Ref Range    Hemoglobin A1c 5.1 <=7.0 %    Estimated Average Glucose 99.7 mg/dL   Comprehensive Metabolic Panel    Collection Time: 08/26/24  6:32 AM   Result Value Ref Range    Sodium 140 136 - 145 mmol/L    Potassium 4.6 3.5 - 5.1 mmol/L    Chloride 107 98 - 107 mmol/L    CO2 24 22 - 29 mmol/L    Glucose 90 74 - 100 mg/dL    Blood Urea Nitrogen 13.0 8.9 - 20.6 mg/dL    Creatinine 1.01 0.73 - 1.18 mg/dL    Calcium 9.2 8.4 - 10.2 mg/dL    Protein Total 6.9 6.4 - 8.3 gm/dL    Albumin 4.0 3.5 - 5.0 g/dL    Globulin 2.9 2.4 - 3.5 gm/dL    Albumin/Globulin Ratio 1.4 1.1 - 2.0 ratio    Bilirubin Total 0.9 <=1.5 mg/dL    ALP 69 40 - 150 unit/L    ALT 25 0 - 55 unit/L    AST 23 5 - 34 unit/L    eGFR >60 mL/min/1.73/m2    Anion Gap 9.0 mEq/L    BUN/Creatinine Ratio 13    TSH    Collection Time: 08/26/24  6:32 AM   Result Value Ref Range    TSH 1.646 0.350 - 4.940 uIU/mL   Lipid Panel    Collection Time: 08/26/24  6:32 AM   Result Value Ref Range    Cholesterol Total 141 <=200 mg/dL    HDL Cholesterol 39 35 - 60 mg/dL    Triglyceride 65 34 - 140 mg/dL    Cholesterol/HDL Ratio 4 0 - 5    Very Low Density Lipoprotein 13     LDL Cholesterol 89.00 50.00 - 140.00 mg/dL   SYPHILIS ANTIBODY (WITH REFLEX RPR)    Collection Time: 08/26/24  6:32 AM   Result Value Ref Range    Syphilis Antibody Nonreactive Nonreactive, Equivocal " "         Medical Review Of Systems:  Constitutional: negative  Respiratory: negative  Cardiovascular: negative  Gastrointestinal: negative  Genitourinary:negative  Musculoskeletal:negative  Neurological: negative       Psychiatric Mental Status Exam:  General Appearance: appears stated age, well-developed, well-nourished  Arousal: alert  Behavior: cooperative  Movements and Motor Activity: no abnormal involuntary movements noted  Orientation: oriented to person, place, time, and situation  Speech: normal rate, normal rhythm, normal volume, normal tone  Mood: "Ok"  Affect: constricted  Thought Process: linear  Associations: intact  Thought Content and Perceptions: no suicidal ideation, no homicidal ideation, no auditory hallucinations, no visual hallucinations, no paranoid ideation, no ideas of reference  Recent and Remote Memory: recent memory intact, remote memory intact; per interview/observation with patient  Attention and Concentration: intact, attentive to conversation; per interview/observation with patient  Fund of Knowledge: intact, aware of current events, vocabulary appropriate; based on history, vocabulary, fund of knowledge, syntax, grammar, and content  Insight: questionable; based on understanding of severity of illness and HPI  Judgment: questionable; based on patient's behavior and HPI     ASSESSMENT/PLAN:   Problems Addressed/Diagnoses:  Bipolar Disorder, most recent episode manic, severe, with psychosis (F31.2)  Cannabis use disorder (F12.20)    Past Medical History:   Diagnosis Date    Bipolar disorder     History of psychiatric hospitalization     Hx of psychiatric care     Psychiatric problem         Plan:  Bipolar disorder, chronic with acute exacerbation  -Continue Zyprexa 5mg BID  -Continue trazodone     Cannabis use, chronic with acute exacerbation  -Group/Individual psychotherapy       Expected Disposition Plan: Home tomorrow        JACQUE Kapoor-BC  "

## 2024-08-27 NOTE — PROGRESS NOTES
08/27/24 1000   [facility] Group Therapy   Group Name Therapeutic Recreation   Specific Interventions Skilled Activity Mild Exercises   Participation Level Active;Supportive;Appropriate;Attentive;Sharing   Participation Quality Cooperative;Social   Insight/Motivation Limited   Affect/Mood Display Appropriate;Bright;Impulsive;Elevated  (appears manic)   Cognition Alert;Pre-Occupied   Psychomotor WNL

## 2024-08-28 PROCEDURE — 25000003 PHARM REV CODE 250: Performed by: PSYCHIATRY & NEUROLOGY

## 2024-08-28 PROCEDURE — 11400000 HC PSYCH PRIVATE ROOM

## 2024-08-28 PROCEDURE — 25000003 PHARM REV CODE 250: Performed by: NURSE PRACTITIONER

## 2024-08-28 RX ORDER — OLANZAPINE 5 MG/1
10 TABLET ORAL NIGHTLY
Status: DISCONTINUED | OUTPATIENT
Start: 2024-08-28 | End: 2024-08-30 | Stop reason: HOSPADM

## 2024-08-28 RX ORDER — OLANZAPINE 5 MG/1
5 TABLET ORAL DAILY
Status: DISCONTINUED | OUTPATIENT
Start: 2024-08-29 | End: 2024-08-30 | Stop reason: HOSPADM

## 2024-08-28 RX ADMIN — HYDROXYZINE HYDROCHLORIDE 50 MG: 50 TABLET, FILM COATED ORAL at 03:08

## 2024-08-28 RX ADMIN — HYDROXYZINE HYDROCHLORIDE 50 MG: 50 TABLET, FILM COATED ORAL at 02:08

## 2024-08-28 RX ADMIN — OLANZAPINE 10 MG: 5 TABLET, FILM COATED ORAL at 08:08

## 2024-08-28 RX ADMIN — TRAZODONE HYDROCHLORIDE 50 MG: 50 TABLET ORAL at 08:08

## 2024-08-28 RX ADMIN — OLANZAPINE 5 MG: 5 TABLET, FILM COATED ORAL at 08:08

## 2024-08-28 NOTE — PLAN OF CARE
Problem: Adult Behavioral Health Plan of Care  Goal: Plan of Care Review  Outcome: Progressing  Flowsheets (Taken 8/28/2024 1124)  Patient Agreement with Plan of Care: agrees  Plan of Care Reviewed With: patient  Goal: Patient-Specific Goal (Individualization)  Outcome: Progressing  Flowsheets (Taken 8/28/2024 1124)  Patient Personal Strengths: medication/treatment adherence  Patient Vulnerabilities: substance abuse/addiction  Goal: Adheres to Safety Considerations for Self and Others  Outcome: Progressing  Flowsheets (Taken 8/28/2024 1124)  Adheres to Safety Considerations for Self and Others: making progress toward outcome  Intervention: Develop and Maintain Individualized Safety Plan  Flowsheets (Taken 8/28/2024 1124)  Safety Measures:   monitored by video   safety rounds completed  Goal: Absence of New-Onset Illness or Injury  Outcome: Progressing  Intervention: Identify and Manage Fall Risk  Flowsheets (Taken 8/28/2024 1124)  Safety Measures:   monitored by video   safety rounds completed  Intervention: Prevent Infection  Flowsheets (Taken 8/28/2024 1124)  Infection Prevention: hand hygiene promoted  Goal: Optimized Coping Skills in Response to Life Stressors  Outcome: Progressing  Flowsheets (Taken 8/28/2024 1124)  Optimized Coping Skills in Response to Life Stressors: making progress toward outcome  Intervention: Promote Effective Coping Strategies  Flowsheets (Taken 8/28/2024 1124)  Supportive Measures:   self-care encouraged   self-reflection promoted  Goal: Develops/Participates in Therapeutic Dalton to Support Successful Transition  Outcome: Progressing  Flowsheets (Taken 8/28/2024 1124)  Develops/Participates in Therapeutic [place] to Support Successful Transition: making progress toward outcome  Intervention: Foster Therapeutic Dalton  Flowsheets (Taken 8/28/2024 1124)  Trust Relationship/Rapport:   care explained   thoughts/feelings acknowledged  Intervention: Mutually Develop Transition  Plan  Flowsheets (Taken 8/28/2024 1124)  Transition Support: follow-up care discussed  Patient/Family Anticipated Services at Transition:   outpatient care   mental health services  Patient/Family Anticipates Transition to: home with family  Goal: Rounds/Family Conference  Outcome: Progressing     Problem: Violence Risk or Actual  Goal: Anger and Impulse Control  Outcome: Progressing  Intervention: Minimize Safety Risk  Flowsheets (Taken 8/28/2024 1124)  Behavior Management: other (see comments)  Sensory Stimulation Regulation: quiet environment promoted  De-Escalation Techniques: quiet time facilitated  Enhanced Safety Measures: monitored by video  Intervention: Promote Self-Control  Flowsheets (Taken 8/28/2024 1124)  Supportive Measures:   self-care encouraged   self-reflection promoted  Environmental Support:   rest periods encouraged   calm environment promoted     Problem: Excessive Substance Use  Goal: Optimized Energy Level (Excessive Substance Use)  Outcome: Progressing  Flowsheets (Taken 8/28/2024 1124)  Mutually Determined Action Steps (Optimized Energy Level): other (see comments)  Intervention: Optimize Energy Level  Flowsheets (Taken 8/28/2024 1124)  Activity (Behavioral Health): activity encouraged  Patient Performed Hygiene: other (see comments)  Diversional Activity: other (see comments)  Goal: Improved Behavioral Control (Excessive Substance Use)  Outcome: Progressing  Flowsheets (Taken 8/28/2024 1124)  Mutually Determined Action Steps (Improved Behavioral Control): other (see comments)  Intervention: Promote Behavior and Impulse Control  Flowsheets (Taken 8/28/2024 1124)  Behavior Management: other (see comments)  Goal: Increased Participation and Engagement (Excessive Substance Use)  Outcome: Progressing  Flowsheets (Taken 8/28/2024 1124)  Mutually Determined Action Steps (Increased Participation and Engagement): other (see comments)  Intervention: Facilitate Participation and  Engagement  Flowsheets (Taken 8/28/2024 1124)  Supportive Measures:   self-care encouraged   self-reflection promoted  Diversional Activity: other (see comments)  Goal: Improved Physiologic Symptoms (Excessive Substance Use)  Outcome: Progressing  Flowsheets (Taken 8/28/2024 1124)  Mutually Determined Action Steps (Improved Physiologic Symptoms): verbalizes physical symptoms  Goal: Enhanced Social, Occupational or Functional Skills (Excessive Substance Use)  Outcome: Progressing  Flowsheets (Taken 8/28/2024 1124)  Mutually Determined Action Steps (Enhanced Social, Occupational or Functional Skills): identifies personal strengths  Intervention: Promote Social, Occupational and Functional Ability  Flowsheets (Taken 8/28/2024 1124)  Trust Relationship/Rapport:   care explained   thoughts/feelings acknowledged  Social Functional Ability Promotion: autonomy promoted     Problem: Psychotic Signs/Symptoms  Goal: Improved Behavioral Control (Psychotic Signs/Symptoms)  Outcome: Progressing  Flowsheets (Taken 8/28/2024 1124)  Mutually Determined Action Steps (Improved Behavioral Control): identifies symptoms triggers  Intervention: Manage Behavior  Flowsheets (Taken 8/28/2024 1124)  De-Escalation Techniques: quiet time facilitated  Goal: Optimal Cognitive Function (Psychotic Signs/Symptoms)  Outcome: Progressing  Flowsheets (Taken 8/28/2024 1124)  Mutually Determined Action Steps (Optimal Cognitive Function): remains focused during activity  Intervention: Support and Promote Cognitive Ability  Flowsheets (Taken 8/28/2024 1124)  Trust Relationship/Rapport:   care explained   thoughts/feelings acknowledged  Goal: Increased Participation and Engagement (Psychotic Signs/Symptoms)  Outcome: Progressing  Flowsheets (Taken 8/28/2024 1124)  Mutually Determined Action Steps (Increased Participation and Engagement): identifies symptoms triggers  Intervention: Facilitate Participation and Engagement  Flowsheets (Taken 8/28/2024  1124)  Supportive Measures:   self-care encouraged   self-reflection promoted  Diversional Activity: other (see comments)  Goal: Improved Mood Symptoms (Psychotic Signs/Symptoms)  Outcome: Progressing  Flowsheets (Taken 8/28/2024 1124)  Mutually Determined Action Steps (Improved Mood Symptoms): acknowledges progress  Intervention: Optimize Emotion and Mood  Flowsheets (Taken 8/28/2024 1124)  Supportive Measures:   self-care encouraged   self-reflection promoted  Diversional Activity: other (see comments)  Goal: Improved Psychomotor Symptoms (Psychotic Signs/Symptoms)  Outcome: Progressing  Flowsheets (Taken 8/28/2024 1124)  Mutually Determined Action Steps (Improved Psychomotor Symptoms): adheres to medication regimen  Intervention: Manage Psychomotor Movement  Flowsheets (Taken 8/28/2024 1124)  Activity (Behavioral Health): activity encouraged  Patient Performed Hygiene: other (see comments)  Diversional Activity: other (see comments)  Goal: Decreased Sensory Symptoms (Psychotic Signs/Symptoms)  Outcome: Progressing  Flowsheets (Taken 8/28/2024 1124)  Mutually Determined Action Steps (Decreased Sensory Symptoms): adheres to medication regimen  Intervention: Minimize and Manage Sensory Impairment  Flowsheets (Taken 8/28/2024 1124)  Sensory Stimulation Regulation: quiet environment promoted  Goal: Improved Sleep (Psychotic Signs/Symptoms)  Outcome: Progressing  Flowsheets (Taken 8/28/2024 1124)  Mutually Determined Action Steps (Improved Sleep): sleeps 4-6 hours at night  Intervention: Promote Healthy Sleep Hygiene  Flowsheets (Taken 8/28/2024 1124)  Sleep Hygiene Promotion: awakenings minimized  Goal: Enhanced Social, Occupational or Functional Skills (Psychotic Signs/Symptoms)  Outcome: Progressing  Flowsheets (Taken 8/28/2024 1124)  Mutually Determined Action Steps (Enhanced Social, Occupational or Functional Skills): identifies personal strengths  Intervention: Promote Social, Occupational and Functional  Ability  Flowsheets (Taken 8/28/2024 1124)  Trust Relationship/Rapport:   care explained   thoughts/feelings acknowledged  Social Functional Ability Promotion: autonomy promoted

## 2024-08-28 NOTE — PLAN OF CARE
Jurgen attended treatment team was cooperative appearing elevated, reporting improvement, is slowly towards his treatment, and attends his ADL's.    Jurgen attends 50% of TR groups, appears manic AEB bizarre and elevated, restless pacing halls, interacts well with peers and staff, and attends his ADL's.

## 2024-08-28 NOTE — PLAN OF CARE
Problem: Adult Behavioral Health Plan of Care  Goal: Plan of Care Review  Outcome: Progressing  Flowsheets (Taken 8/27/2024 2111)  Patient Agreement with Plan of Care: agrees  Plan of Care Reviewed With: patient  Goal: Patient-Specific Goal (Individualization)  Outcome: Progressing  Flowsheets (Taken 8/27/2024 2111)  Patient Personal Strengths: medication/treatment adherence  Goal: Adheres to Safety Considerations for Self and Others  Outcome: Progressing  Flowsheets (Taken 8/27/2024 2111)  Adheres to Safety Considerations for Self and Others: making progress toward outcome  Goal: Absence of New-Onset Illness or Injury  Outcome: Progressing  Goal: Optimized Coping Skills in Response to Life Stressors  Outcome: Progressing  Flowsheets (Taken 8/27/2024 2111)  Optimized Coping Skills in Response to Life Stressors: making progress toward outcome  Goal: Develops/Participates in Therapeutic [place] to Support Successful Transition  Outcome: Progressing  Flowsheets (Taken 8/27/2024 2111)  Develops/Participates in Therapeutic [place] to Support Successful Transition: making progress toward outcome  Goal: Rounds/Family Conference  Outcome: Progressing     Problem: Violence Risk or Actual  Goal: Anger and Impulse Control  Outcome: Progressing     Problem: Excessive Substance Use  Goal: Optimized Energy Level (Excessive Substance Use)  Outcome: Progressing  Goal: Improved Behavioral Control (Excessive Substance Use)  Outcome: Progressing  Goal: Increased Participation and Engagement (Excessive Substance Use)  Outcome: Progressing  Goal: Improved Physiologic Symptoms (Excessive Substance Use)  Outcome: Progressing  Goal: Enhanced Social, Occupational or Functional Skills (Excessive Substance Use)  Outcome: Progressing     Problem: Psychotic Signs/Symptoms  Goal: Improved Behavioral Control (Psychotic Signs/Symptoms)  Outcome: Progressing  Flowsheets (Taken 8/27/2024 2111)  Mutually Determined Action Steps (Improved  Behavioral Control): identifies symptoms triggers  Goal: Optimal Cognitive Function (Psychotic Signs/Symptoms)  Outcome: Progressing  Flowsheets (Taken 8/27/2024 2111)  Mutually Determined Action Steps (Optimal Cognitive Function): remains focused during activity  Goal: Increased Participation and Engagement (Psychotic Signs/Symptoms)  Outcome: Progressing  Flowsheets (Taken 8/27/2024 2111)  Mutually Determined Action Steps (Increased Participation and Engagement): identifies symptoms triggers  Goal: Improved Mood Symptoms (Psychotic Signs/Symptoms)  Outcome: Progressing  Flowsheets (Taken 8/27/2024 2111)  Mutually Determined Action Steps (Improved Mood Symptoms): acknowledges progress  Goal: Improved Psychomotor Symptoms (Psychotic Signs/Symptoms)  Outcome: Progressing  Flowsheets (Taken 8/27/2024 2111)  Mutually Determined Action Steps (Improved Psychomotor Symptoms): adheres to medication regimen  Goal: Decreased Sensory Symptoms (Psychotic Signs/Symptoms)  Outcome: Progressing  Flowsheets (Taken 8/27/2024 2111)  Mutually Determined Action Steps (Decreased Sensory Symptoms): adheres to medication regimen  Goal: Improved Sleep (Psychotic Signs/Symptoms)  Outcome: Progressing  Goal: Enhanced Social, Occupational or Functional Skills (Psychotic Signs/Symptoms)  Outcome: Progressing   AAO.  Remains anxious and flat. Denies SI/HI.  Auditory hallucinations noted.   Compliant with meds and cooperative with staff.

## 2024-08-28 NOTE — PROGRESS NOTES
"2024  Jurgen Berg   1995   [id number]        Psychiatry Progress Note     Chief Complaint: "Doing good"    SUBJECTIVE:   Jurgen Berg is a 29 y.o. male placed under a PEC at [facility] due to acute angelo.  He also admitted to using synthetic cannabinoids.    Staff reports that patient has remained elevated.  Recently told staff that he saw his  father when he was high and that he cannot wait to be able to give his father flowers.    This morning, he states that he is "blessed".  States that he is going to stop smoking "Madeleine Choi" because it is turning his lips purple.  States that he would like to be discharged tomorrow "with jose, and peace, and lust."  Tolerating current medication regimen without issues.  Will increase Zyprexa today and will monitor progress.    UDS: (+)cannabis  Blood alcohol: <10    Current Medications:   Scheduled Meds:    nicotine  1 patch Transdermal Daily    OLANZapine  5 mg Oral BID    traZODone  50 mg Oral QHS      PRN Meds:   Current Facility-Administered Medications:     acetaminophen, 650 mg, Oral, Q6H PRN    aluminum-magnesium hydroxide-simethicone, 30 mL, Oral, Q6H PRN    haloperidoL, 10 mg, Oral, Q4H PRN **AND** diphenhydrAMINE, 50 mg, Oral, Q4H PRN **AND** LORazepam, 2 mg, Oral, Q4H PRN **AND** haloperidol lactate, 10 mg, Intramuscular, Q4H PRN **AND** diphenhydrAMINE, 50 mg, Intramuscular, Q4H PRN **AND** lorazepam, 2 mg, Intramuscular, Q4H PRN    hydrOXYzine HCL, 50 mg, Oral, Q4H PRN   Psychotherapeutics (From admission, onward)      Start     Stop Route Frequency Ordered    24  OLANZapine tablet 5 mg         -- Oral 2 times daily 24  traZODone tablet 50 mg         -- Oral Nightly 24  haloperidoL tablet 10 mg  (Med - Acute  Behavioral Management)        Placed in "And" Linked Group    -- Oral Every 4 hours PRN 24  LORazepam tablet 2 mg  (Med - Acute  Behavioral " "Management)        Placed in "And" Linked Group    -- Oral Every 4 hours PRN 08/24/24 2152    08/24/24 2152  haloperidol lactate injection 10 mg  (Med - Acute  Behavioral Management)        Placed in "And" Linked Group    -- IM Every 4 hours PRN 08/24/24 2152 08/24/24 2152  LORazepam injection 2 mg  (Med - Acute  Behavioral Management)        Placed in "And" Linked Group    -- IM Every 4 hours PRN 08/24/24 2152            Allergies:   Review of patient's allergies indicates:  No Known Allergies     OBJECTIVE:   Vitals   Vitals:    08/28/24 0701   BP: 133/88   Pulse: 75   Resp: 16   Temp: 98 °F (36.7 °C)        Labs/Imaging/Studies:   No results found for this or any previous visit (from the past 36 hour(s)).         Medical Review Of Systems:  Constitutional: negative  Respiratory: negative  Cardiovascular: negative  Gastrointestinal: negative  Genitourinary:negative  Musculoskeletal:negative  Neurological: negative       Psychiatric Mental Status Exam:  General Appearance: appears stated age, well-developed, well-nourished  Arousal: alert  Behavior: cooperative  Movements and Motor Activity: no abnormal involuntary movements noted  Orientation: oriented to person, place, time, and situation  Speech: normal rate, normal rhythm, normal volume, normal tone  Mood: "Ok"  Affect: constricted  Thought Process: linear  Associations: intact  Thought Content and Perceptions: no suicidal ideation, no homicidal ideation, no auditory hallucinations, no visual hallucinations, no paranoid ideation, no ideas of reference  Recent and Remote Memory: recent memory intact, remote memory intact; per interview/observation with patient  Attention and Concentration: intact, attentive to conversation; per interview/observation with patient  Fund of Knowledge: intact, aware of current events, vocabulary appropriate; based on history, vocabulary, fund of knowledge, syntax, grammar, and content  Insight: questionable; based on " understanding of severity of illness and HPI  Judgment: questionable; based on patient's behavior and HPI     ASSESSMENT/PLAN:   Problems Addressed/Diagnoses:  Bipolar Disorder, most recent episode manic, severe, with psychosis (F31.2)  Cannabis use disorder (F12.20)    Past Medical History:   Diagnosis Date    Bipolar disorder     History of psychiatric hospitalization     Hx of psychiatric care     Psychiatric problem         Plan:  Bipolar disorder, chronic with acute exacerbation  -Increase Zyprexa to 5mg daily and 10mg HS  -Continue trazodone     Cannabis use, chronic with acute exacerbation  -Group/Individual psychotherapy       Expected Disposition Plan: Home (1-2 days)        Elfego Booth M.D.

## 2024-08-28 NOTE — PROGRESS NOTES
08/28/24 1025   [facility] Group Therapy   Group Name Therapeutic Recreation   Specific Interventions Skilled Activity Mild Exercises   Participation Level Active;Supportive;Appropriate;Attentive;Sharing   Participation Quality Cooperative   Insight/Motivation Limited;Applies New Skills   Affect/Mood Display Appropriate;Bizarre;Elevated;Impulsive;Restless   Cognition Alert   Psychomotor WNL

## 2024-08-28 NOTE — PROGRESS NOTES
08/28/24 1400   [facility] Group Therapy   Group Name Therapeutic Recreation   Specific Interventions Skilled Activity Leisure Education and Awareness   Participation Level Active;Supportive;Appropriate;Attentive;Sharing   Participation Quality Cooperative;Social   Insight/Motivation Limited;Applies New Skills   Affect/Mood Display Appropriate;Impulsive;Elevated;Bright   Cognition Alert   Psychomotor WNL

## 2024-08-28 NOTE — NURSING
At 15:29 the patient was given PRN atarax for anxiety and at 16:00 the patient verbalized some relief

## 2024-08-28 NOTE — CARE UPDATE
Sw attempted contact with pt sister, Torrie [phone], no answer and vm unable to be left due to full vm.

## 2024-08-28 NOTE — CARE UPDATE
Treatment Team    Pt seen for treatment team today with interdisciplinary team. Pt was elevated and rambling with Tx team. Pt stated he needs to stay away from Madeleine Choi and he is sorry for lying to us. Pt stated he is a test Beta to us. Pt started talking about dc and stated he deserves to be out of here with jose, peace and lust from us. Pt verbalized understanding of care plan and agreed to being discharged to home located at [address] [place] [address] with outpatient follow-up.

## 2024-08-29 PROCEDURE — 25000003 PHARM REV CODE 250: Performed by: PSYCHIATRY & NEUROLOGY

## 2024-08-29 PROCEDURE — 25000003 PHARM REV CODE 250: Performed by: NURSE PRACTITIONER

## 2024-08-29 PROCEDURE — 11400000 HC PSYCH PRIVATE ROOM

## 2024-08-29 RX ORDER — OLANZAPINE 10 MG/1
10 TABLET ORAL NIGHTLY
Qty: 30 TABLET | Refills: 0 | Status: SHIPPED | OUTPATIENT
Start: 2024-08-29 | End: 2024-09-28

## 2024-08-29 RX ORDER — OLANZAPINE 5 MG/1
5 TABLET ORAL DAILY
Qty: 30 TABLET | Refills: 0 | Status: SHIPPED | OUTPATIENT
Start: 2024-08-30 | End: 2024-09-29

## 2024-08-29 RX ORDER — IBUPROFEN 200 MG
1 TABLET ORAL DAILY
Qty: 28 PATCH | Refills: 0 | Status: SHIPPED | OUTPATIENT
Start: 2024-08-30 | End: 2024-09-27

## 2024-08-29 RX ORDER — TRAZODONE HYDROCHLORIDE 50 MG/1
50 TABLET ORAL NIGHTLY
Qty: 30 TABLET | Refills: 0 | Status: SHIPPED | OUTPATIENT
Start: 2024-08-29 | End: 2024-09-28

## 2024-08-29 RX ADMIN — HYDROXYZINE HYDROCHLORIDE 50 MG: 50 TABLET, FILM COATED ORAL at 02:08

## 2024-08-29 RX ADMIN — OLANZAPINE 10 MG: 5 TABLET, FILM COATED ORAL at 08:08

## 2024-08-29 RX ADMIN — HYDROXYZINE HYDROCHLORIDE 50 MG: 50 TABLET, FILM COATED ORAL at 10:08

## 2024-08-29 RX ADMIN — TRAZODONE HYDROCHLORIDE 50 MG: 50 TABLET ORAL at 08:08

## 2024-08-29 RX ADMIN — OLANZAPINE 5 MG: 5 TABLET, FILM COATED ORAL at 08:08

## 2024-08-29 NOTE — PLAN OF CARE
Problem: Adult Behavioral Health Plan of Care  Goal: Plan of Care Review  Outcome: Progressing  Flowsheets (Taken 8/29/2024 1633)  Patient Agreement with Plan of Care: agrees  Plan of Care Reviewed With: patient  Goal: Patient-Specific Goal (Individualization)  Outcome: Progressing  Flowsheets (Taken 8/29/2024 1633)  Patient Personal Strengths:   no history of violence   tolerant   expressive of needs  Patient Vulnerabilities: substance abuse/addiction  Goal: Adheres to Safety Considerations for Self and Others  Outcome: Progressing  Flowsheets (Taken 8/29/2024 1633)  Adheres to Safety Considerations for Self and Others: making progress toward outcome  Intervention: Develop and Maintain Individualized Safety Plan  Flowsheets (Taken 8/29/2024 1633)  Safety Measures:   monitored by video   safety rounds completed  Goal: Absence of New-Onset Illness or Injury  Outcome: Progressing  Intervention: Identify and Manage Fall Risk  Flowsheets (Taken 8/29/2024 1633)  Safety Measures:   monitored by video   safety rounds completed  Intervention: Prevent VTE (Venous Thromboembolism)  Flowsheets (Taken 8/29/2024 1633)  VTE Prevention/Management:   ambulation promoted   fluids promoted  Intervention: Prevent Infection  Flowsheets (Taken 8/29/2024 1633)  Infection Prevention:   hand hygiene promoted   rest/sleep promoted  Goal: Optimized Coping Skills in Response to Life Stressors  Outcome: Progressing  Flowsheets (Taken 8/29/2024 1633)  Optimized Coping Skills in Response to Life Stressors: making progress toward outcome  Intervention: Promote Effective Coping Strategies  Flowsheets (Taken 8/29/2024 1633)  Supportive Measures:   active listening utilized   verbalization of feelings encouraged  Goal: Develops/Participates in Therapeutic [place] to Support Successful Transition  Outcome: Progressing  Flowsheets (Taken 8/29/2024 1633)  Develops/Participates in Therapeutic [place] to Support Successful Transition: making progress  toward outcome  Intervention: Foster Therapeutic [place]  Flowsheets (Taken 8/29/2024 1633)  Trust Relationship/Rapport:   care explained   thoughts/feelings acknowledged  Intervention: Mutually Develop Transition Plan  Flowsheets (Taken 8/29/2024 1633)  Current Discharge Risk:   psychiatric illness   substance use/abuse  Readmission Within the Last 30 Days: no previous admission in last 30 days  Outpatient/Agency/Support Group Needs:   outpatient medication management   outpatient counseling  Transition Support: follow-up care discussed  Anticipated Discharge Disposition: home with family  Patient/Family Anticipated Services at Transition:   outpatient care   mental health services  Patient/Family Anticipates Transition to: home with family  Concerns to be Addressed:   medication   mental health   substance/tobacco abuse/use  Goal: Rounds/Family Conference  Outcome: Progressing  Flowsheets (Taken 8/29/2024 1633)  Participants:   milieu/psych techs   nursing     Problem: Violence Risk or Actual  Goal: Anger and Impulse Control  Outcome: Progressing  Intervention: Minimize Safety Risk  Flowsheets (Taken 8/29/2024 1633)  Behavior Management: behavioral plan developed  Sensory Stimulation Regulation: quiet environment promoted  De-Escalation Techniques: medication administered  Enhanced Safety Measures: room near unit station  Intervention: Promote Self-Control  Flowsheets (Taken 8/29/2024 1633)  Supportive Measures:   active listening utilized   verbalization of feelings encouraged  Environmental Support:   environmental consistency promoted   rest periods encouraged     Problem: Excessive Substance Use  Goal: Optimized Energy Level (Excessive Substance Use)  Outcome: Progressing  Intervention: Optimize Energy Level  Flowsheets (Taken 8/29/2024 1633)  Activity (Behavioral Health): activity encouraged  Diversional Activity: television  Goal: Improved Behavioral Control (Excessive Substance Use)  Outcome:  Progressing  Intervention: Promote Behavior and Impulse Control  Flowsheets (Taken 8/29/2024 1633)  Behavior Management: behavioral plan developed  Goal: Increased Participation and Engagement (Excessive Substance Use)  Outcome: Progressing  Intervention: Facilitate Participation and Engagement  Flowsheets (Taken 8/29/2024 1633)  Supportive Measures:   active listening utilized   verbalization of feelings encouraged  Diversional Activity: television  Goal: Improved Physiologic Symptoms (Excessive Substance Use)  Outcome: Progressing  Flowsheets (Taken 8/29/2024 1633)  Mutually Determined Action Steps (Improved Physiologic Symptoms): verbalizes physical symptoms  Intervention: Optimize Physiologic Function  Flowsheets (Taken 8/29/2024 1633)  Oral Nutrition Promotion: social interaction promoted  Goal: Enhanced Social, Occupational or Functional Skills (Excessive Substance Use)  Outcome: Progressing  Flowsheets (Taken 8/29/2024 1633)  Mutually Determined Action Steps (Enhanced Social, Occupational or Functional Skills): identifies personal strengths  Intervention: Promote Social, Occupational and Functional Ability  Flowsheets (Taken 8/29/2024 1633)  Trust Relationship/Rapport:   care explained   thoughts/feelings acknowledged     Problem: Psychotic Signs/Symptoms  Goal: Improved Behavioral Control (Psychotic Signs/Symptoms)  Outcome: Progressing  Flowsheets (Taken 8/29/2024 1633)  Mutually Determined Action Steps (Improved Behavioral Control): identifies symptoms triggers  Intervention: Manage Behavior  Flowsheets (Taken 8/29/2024 1633)  De-Escalation Techniques: medication administered  Goal: Optimal Cognitive Function (Psychotic Signs/Symptoms)  Outcome: Progressing  Flowsheets (Taken 8/29/2024 1633)  Mutually Determined Action Steps (Optimal Cognitive Function): remains focused during activity  Intervention: Support and Promote Cognitive Ability  Flowsheets (Taken 8/29/2024 1633)  Trust Relationship/Rapport:    care explained   thoughts/feelings acknowledged  Goal: Increased Participation and Engagement (Psychotic Signs/Symptoms)  Outcome: Progressing  Flowsheets (Taken 8/29/2024 1633)  Mutually Determined Action Steps (Increased Participation and Engagement): identifies symptoms triggers  Intervention: Facilitate Participation and Engagement  Flowsheets (Taken 8/29/2024 1633)  Supportive Measures:   active listening utilized   verbalization of feelings encouraged  Diversional Activity: television  Goal: Improved Mood Symptoms (Psychotic Signs/Symptoms)  Outcome: Progressing  Flowsheets (Taken 8/29/2024 1633)  Mutually Determined Action Steps (Improved Mood Symptoms): acknowledges progress  Intervention: Optimize Emotion and Mood  Flowsheets (Taken 8/29/2024 1633)  Supportive Measures:   active listening utilized   verbalization of feelings encouraged  Diversional Activity: television  Goal: Improved Psychomotor Symptoms (Psychotic Signs/Symptoms)  Outcome: Progressing  Flowsheets (Taken 8/29/2024 1633)  Mutually Determined Action Steps (Improved Psychomotor Symptoms): adheres to medication regimen  Intervention: Manage Psychomotor Movement  Flowsheets (Taken 8/29/2024 1633)  Activity (Behavioral Health): activity encouraged  Diversional Activity: television  Goal: Decreased Sensory Symptoms (Psychotic Signs/Symptoms)  Outcome: Progressing  Flowsheets (Taken 8/29/2024 1633)  Mutually Determined Action Steps (Decreased Sensory Symptoms): adheres to medication regimen  Intervention: Minimize and Manage Sensory Impairment  Flowsheets (Taken 8/29/2024 1633)  Sensory Stimulation Regulation: quiet environment promoted  Goal: Improved Sleep (Psychotic Signs/Symptoms)  Outcome: Progressing  Flowsheets (Taken 8/29/2024 1633)  Mutually Determined Action Steps (Improved Sleep): sleeps 4-6 hours at night  Intervention: Promote Healthy Sleep Hygiene  Flowsheets (Taken 8/29/2024 1633)  Sleep Hygiene Promotion:   awakenings minimized    regular sleep pattern promoted  Goal: Enhanced Social, Occupational or Functional Skills (Psychotic Signs/Symptoms)  Outcome: Progressing  Flowsheets (Taken 8/29/2024 1633)  Mutually Determined Action Steps (Enhanced Social, Occupational or Functional Skills): identifies personal strengths  Intervention: Promote Social, Occupational and Functional Ability  Flowsheets (Taken 8/29/2024 1633)  Trust Relationship/Rapport:   care explained   thoughts/feelings acknowledged

## 2024-08-29 NOTE — NURSING
"PRN Administration Note:    Behavior:    Patient (Jurgen Berg is a 29 y.o. male, : 1995, MRN: [id number])     Allergies: Patient has no known allergies.    Jurgen's  height is 5' 8" (1.727 m) and weight is 63.5 kg (140 lb). His temperature is 97.9 °F (36.6 °C). His blood pressure is 138/82 and his pulse is 75. His respiration is 18 and oxygen saturation is 98%.     Reason for PRN Administration: Complaints of anxiety, 10/10.________.    Intervention:    Administered  Atarax 50 mg.,_______ per physician order to Jurgen       Response:    Jurgen tolerated administration well.      Plan:     Continue to monitor per MD/PA/APRN orders; and reevaluate medication effectiveness within 30 minutes.    "

## 2024-08-29 NOTE — PLAN OF CARE
Problem: Adult Behavioral Health Plan of Care  Goal: Plan of Care Review  Outcome: Progressing  Flowsheets (Taken 8/29/2024 0352)  Patient Agreement with Plan of Care: agrees  Plan of Care Reviewed With: patient  Goal: Patient-Specific Goal (Individualization)  Outcome: Progressing  Flowsheets (Taken 8/29/2024 0352)  Patient Personal Strengths: no history of violence  Patient Vulnerabilities: substance abuse/addiction  Goal: Adheres to Safety Considerations for Self and Others  Outcome: Progressing  Flowsheets (Taken 8/29/2024 0352)  Adheres to Safety Considerations for Self and Others: making progress toward outcome  Intervention: Develop and Maintain Individualized Safety Plan  Flowsheets (Taken 8/29/2024 0352)  Safety Measures:   monitored by video   safety rounds completed  Goal: Absence of New-Onset Illness or Injury  Outcome: Progressing  Intervention: Identify and Manage Fall Risk  Flowsheets (Taken 8/29/2024 0352)  Safety Measures:   monitored by video   safety rounds completed  Intervention: Prevent VTE (Venous Thromboembolism)  Flowsheets (Taken 8/29/2024 0352)  VTE Prevention/Management:   ambulation promoted   fluids promoted  Intervention: Prevent Infection  Flowsheets (Taken 8/29/2024 0352)  Infection Prevention:   hand hygiene promoted   rest/sleep promoted  Goal: Optimized Coping Skills in Response to Life Stressors  Outcome: Progressing  Flowsheets (Taken 8/29/2024 0352)  Optimized Coping Skills in Response to Life Stressors: making progress toward outcome  Intervention: Promote Effective Coping Strategies  Flowsheets (Taken 8/29/2024 0352)  Supportive Measures:   active listening utilized   verbalization of feelings encouraged  Goal: Develops/Participates in Therapeutic [place] to Support Successful Transition  Outcome: Progressing  Flowsheets (Taken 8/29/2024 0352)  Develops/Participates in Therapeutic [place] to Support Successful Transition: making progress toward outcome  Intervention:  Foster Therapeutic [place]  Flowsheets (Taken 8/29/2024 0352)  Trust Relationship/Rapport:   care explained   thoughts/feelings acknowledged  Intervention: Mutually Develop Transition Plan  Flowsheets (Taken 8/29/2024 0352)  Current Discharge Risk:   psychiatric illness   substance use/abuse  Readmission Within the Last 30 Days: no previous admission in last 30 days  Outpatient/Agency/Support Group Needs:   outpatient medication management   outpatient counseling  Anticipated Discharge Disposition: home with family  Patient/Family Anticipated Services at Transition:   outpatient care   mental health services  Patient/Family Anticipates Transition to: home with family  Concerns to be Addressed:   medication   mental health   substance/tobacco abuse/use  Goal: Rounds/Family Conference  Outcome: Progressing  Flowsheets (Taken 8/29/2024 0352)  Participants:   milieu/psych techs   nursing     Problem: Violence Risk or Actual  Goal: Anger and Impulse Control  Outcome: Progressing  Intervention: Minimize Safety Risk  Flowsheets (Taken 8/29/2024 0352)  Behavior Management: behavioral plan reviewed  Sensory Stimulation Regulation: quiet environment promoted  De-Escalation Techniques:   medication administered   quiet time facilitated  Enhanced Safety Measures: monitored by video  Intervention: Promote Self-Control  Flowsheets (Taken 8/29/2024 0352)  Supportive Measures:   active listening utilized   verbalization of feelings encouraged  Environmental Support:   environmental consistency promoted   rest periods encouraged     Problem: Excessive Substance Use  Goal: Optimized Energy Level (Excessive Substance Use)  Outcome: Progressing  Intervention: Optimize Energy Level  Flowsheets (Taken 8/29/2024 0352)  Activity (Behavioral Health): up ad anisa  Goal: Improved Behavioral Control (Excessive Substance Use)  Outcome: Progressing  Intervention: Promote Behavior and Impulse Control  Flowsheets (Taken 8/29/2024 0352)  Behavior  Management: behavioral plan reviewed  Goal: Increased Participation and Engagement (Excessive Substance Use)  Outcome: Progressing  Intervention: Facilitate Participation and Engagement  Flowsheets (Taken 8/29/2024 0352)  Supportive Measures:   active listening utilized   verbalization of feelings encouraged  Goal: Improved Physiologic Symptoms (Excessive Substance Use)  Outcome: Progressing  Intervention: Optimize Physiologic Function  Flowsheets (Taken 8/29/2024 0352)  Oral Nutrition Promotion: social interaction promoted  Nutrition Interventions: food preferences provided  Goal: Enhanced Social, Occupational or Functional Skills (Excessive Substance Use)  Outcome: Progressing  Intervention: Promote Social, Occupational and Functional Ability  Flowsheets (Taken 8/29/2024 0352)  Trust Relationship/Rapport:   care explained   thoughts/feelings acknowledged  Social Functional Ability Promotion:   autonomy promoted   self-expression encouraged     Problem: Psychotic Signs/Symptoms  Goal: Improved Behavioral Control (Psychotic Signs/Symptoms)  Outcome: Progressing  Intervention: Manage Behavior  Flowsheets (Taken 8/29/2024 0352)  De-Escalation Techniques:   medication administered   quiet time facilitated  Goal: Optimal Cognitive Function (Psychotic Signs/Symptoms)  Outcome: Progressing  Intervention: Support and Promote Cognitive Ability  Flowsheets (Taken 8/29/2024 0352)  Trust Relationship/Rapport:   care explained   thoughts/feelings acknowledged  Goal: Increased Participation and Engagement (Psychotic Signs/Symptoms)  Outcome: Progressing  Intervention: Facilitate Participation and Engagement  Flowsheets (Taken 8/29/2024 0352)  Supportive Measures:   active listening utilized   verbalization of feelings encouraged  Goal: Improved Mood Symptoms (Psychotic Signs/Symptoms)  Outcome: Progressing  Flowsheets (Taken 8/29/2024 0352)  Mutually Determined Action Steps (Improved Mood Symptoms): acknowledges  progress  Intervention: Optimize Emotion and Mood  Flowsheets (Taken 8/29/2024 0352)  Supportive Measures:   active listening utilized   verbalization of feelings encouraged  Goal: Improved Psychomotor Symptoms (Psychotic Signs/Symptoms)  Outcome: Progressing  Flowsheets (Taken 8/29/2024 0352)  Mutually Determined Action Steps (Improved Psychomotor Symptoms): adheres to medication regimen  Intervention: Manage Psychomotor Movement  Flowsheets (Taken 8/29/2024 0352)  Activity (Behavioral Health): up ad [place]  Goal: Decreased Sensory Symptoms (Psychotic Signs/Symptoms)  Outcome: Progressing  Flowsheets (Taken 8/29/2024 0352)  Mutually Determined Action Steps (Decreased Sensory Symptoms): adheres to medication regimen  Intervention: Minimize and Manage Sensory Impairment  Flowsheets (Taken 8/29/2024 0352)  Sensory Stimulation Regulation: quiet environment promoted  Goal: Improved Sleep (Psychotic Signs/Symptoms)  Outcome: Progressing  Flowsheets (Taken 8/29/2024 0352)  Mutually Determined Action Steps (Improved Sleep): sleeps 4-6 hours at night  Intervention: Promote Healthy Sleep Hygiene  Flowsheets (Taken 8/29/2024 0352)  Sleep Hygiene Promotion:   awakenings minimized   regular sleep pattern promoted  Goal: Enhanced Social, Occupational or Functional Skills (Psychotic Signs/Symptoms)  Outcome: Progressing  Intervention: Promote Social, Occupational and Functional Ability  Flowsheets (Taken 8/29/2024 0352)  Trust Relationship/Rapport:   care explained   thoughts/feelings acknowledged  Social Functional Ability Promotion:   autonomy promoted   self-expression encouraged   AAOx4. Unkempt appearance. Darting eye contact. Anxious and euphoric. States that he is so happy and he feels so loved from the past to the present and even in the future.  Denies auditory hallucinations but states that he plays these little games with people in his head but that they don't bother him much. Compliant with medications. Atarax 50 mg  given at 0240 when he wakes up with irrational fear of never going home. Reports that he is eating and sleeping well. No agitation or aggression noted. States that he attended group today. Continue with plan of care and q 15 minute safety checks.

## 2024-08-29 NOTE — NURSING
"PRN Medication Follow-up Note:    Behavior:    Patient (Jurgen Berg is a 29 y.o. male, : 1995, MRN: [id number])     Allergies: Patient has no known allergies.    [place]  height is 5' 8" (1.727 m) and weight is 63.5 kg (140 lb). His temperature is 97.9 °F (36.6 °C). His blood pressure is 138/82 and his pulse is 75. His respiration is 18 and oxygen saturation is 98%.     Administered _ Atarax 50 mg.,______ per physician order to Jurgen       Intervention:    Intervention to Jurgen's response: Administer medications as ordered. _________.       Response:    Jurgen's response: Decreasing anxiety, 0/10.________      Plan:     Continue to monitor per MD/PA/APRN orders; and reevaluate medication effectiveness within 30 minutes.    "

## 2024-08-29 NOTE — PROGRESS NOTES
"8/29/2024  Jurgen Berg   1995   [id number]        Psychiatry Progress Note     Chief Complaint: "Doing good"    SUBJECTIVE:   Jurgen Berg is a 29 y.o. male placed under a PEC at [facility] due to acute angelo.  He also admitted to using synthetic cannabinoids.    Today patient states that he is feeling good. Staff reports that patient has remained elevated but cooperative and compliant. Patient remains grandiose but I believe he is currently at his baseline based on previous experience with this patient. Tolerating current medication regimen without issues.  Will continue with current POC and plan for discharge tomorrow.     UDS: (+)cannabis  Blood alcohol: <10    Current Medications:   Scheduled Meds:    nicotine  1 patch Transdermal Daily    OLANZapine  10 mg Oral QHS    OLANZapine  5 mg Oral Daily    traZODone  50 mg Oral QHS      PRN Meds:   Current Facility-Administered Medications:     acetaminophen, 650 mg, Oral, Q6H PRN    aluminum-magnesium hydroxide-simethicone, 30 mL, Oral, Q6H PRN    haloperidoL, 10 mg, Oral, Q4H PRN **AND** diphenhydrAMINE, 50 mg, Oral, Q4H PRN **AND** LORazepam, 2 mg, Oral, Q4H PRN **AND** haloperidol lactate, 10 mg, Intramuscular, Q4H PRN **AND** diphenhydrAMINE, 50 mg, Intramuscular, Q4H PRN **AND** lorazepam, 2 mg, Intramuscular, Q4H PRN    hydrOXYzine HCL, 50 mg, Oral, Q4H PRN   Psychotherapeutics (From admission, onward)      Start     Stop Route Frequency Ordered    08/29/24 0900  OLANZapine tablet 5 mg         -- Oral Daily 08/28/24 0942    08/28/24 2100  OLANZapine tablet 10 mg         -- Oral Nightly 08/28/24 0942    08/25/24 2100  traZODone tablet 50 mg         -- Oral Nightly 08/25/24 1909 08/24/24 2152  haloperidoL tablet 10 mg  (Med - Acute  Behavioral Management)        Placed in "And" Linked Group    -- Oral Every 4 hours PRN 08/24/24 2152 08/24/24 2152  LORazepam tablet 2 mg  (Med - Acute  Behavioral Management)        Placed in "And" Linked Group    -- Oral " "Every 4 hours PRN 08/24/24 2152    08/24/24 2152  haloperidol lactate injection 10 mg  (Med - Acute  Behavioral Management)        Placed in "And" Linked Group    -- IM Every 4 hours PRN 08/24/24 2152    08/24/24 2152  LORazepam injection 2 mg  (Med - Acute  Behavioral Management)        Placed in "And" Linked Group    -- IM Every 4 hours PRN 08/24/24 2152            Allergies:   Review of patient's allergies indicates:  No Known Allergies     OBJECTIVE:   Vitals   Vitals:    08/29/24 0700   BP: 138/82   Pulse: 75   Resp: 18   Temp: 97.9 °F (36.6 °C)        Labs/Imaging/Studies:   No results found for this or any previous visit (from the past 36 hour(s)).         Medical Review Of Systems:  Constitutional: negative  Respiratory: negative  Cardiovascular: negative  Gastrointestinal: negative  Genitourinary:negative  Musculoskeletal:negative  Neurological: negative       Psychiatric Mental Status Exam:  General Appearance: appears stated age, well-developed, well-nourished  Arousal: alert  Behavior: cooperative  Movements and Motor Activity: no abnormal involuntary movements noted  Orientation: oriented to person, place, time, and situation  Speech: normal rate, normal rhythm, normal volume, normal tone  Mood: "Ok"  Affect: constricted  Thought Process: linear  Associations: intact  Thought Content and Perceptions: no suicidal ideation, no homicidal ideation, no auditory hallucinations, no visual hallucinations, no paranoid ideation, no ideas of reference  Recent and Remote Memory: recent memory intact, remote memory intact; per interview/observation with patient  Attention and Concentration: intact, attentive to conversation; per interview/observation with patient  Fund of Knowledge: intact, aware of current events, vocabulary appropriate; based on history, vocabulary, fund of knowledge, syntax, grammar, and content  Insight: questionable; based on understanding of severity of illness and HPI  Judgment: questionable; " based on patient's behavior and HPI     ASSESSMENT/PLAN:   Problems Addressed/Diagnoses:  Bipolar Disorder, most recent episode manic, severe, with psychosis (F31.2)  Cannabis use disorder (F12.20)    Past Medical History:   Diagnosis Date    Bipolar disorder     History of psychiatric hospitalization     Hx of psychiatric care     Psychiatric problem         Plan:  Bipolar disorder, chronic with acute exacerbation  -Continue Zyprexa 5mg daily and 10mg HS  -Continue trazodone     Cannabis use, chronic with acute exacerbation  -Group/Individual psychotherapy       Expected Disposition Plan: Home (1-2 days)        JACQUE Kapoor-BC

## 2024-08-30 VITALS
HEART RATE: 79 BPM | SYSTOLIC BLOOD PRESSURE: 122 MMHG | OXYGEN SATURATION: 95 % | WEIGHT: 140 LBS | RESPIRATION RATE: 18 BRPM | BODY MASS INDEX: 21.22 KG/M2 | TEMPERATURE: 98 F | DIASTOLIC BLOOD PRESSURE: 90 MMHG | HEIGHT: 68 IN

## 2024-08-30 PROCEDURE — 25000003 PHARM REV CODE 250: Performed by: PSYCHIATRY & NEUROLOGY

## 2024-08-30 RX ADMIN — HYDROXYZINE HYDROCHLORIDE 50 MG: 50 TABLET, FILM COATED ORAL at 03:08

## 2024-08-30 RX ADMIN — OLANZAPINE 5 MG: 5 TABLET, FILM COATED ORAL at 09:08

## 2024-08-30 NOTE — PROGRESS NOTES
"8/30/2024  Jurgen Berg   1995   [id number]        Psychiatry Progress Note     Chief Complaint: "Doing good"    SUBJECTIVE:   Jurgen Berg is a 29 y.o. male placed under a PEC at [facility] due to acute angelo.  He also admitted to using synthetic cannabinoids.    Today patient states that he is feeling "A-1". No overt behavioral issues reported by staff. Patient affect was improved today. Patient seemed more calm and less elevated this morning. Tolerating current medication regimen without issues.  Will continue with current POC and proceed with discharge today.     UDS: (+)cannabis  Blood alcohol: <10    Current Medications:   Scheduled Meds:    nicotine  1 patch Transdermal Daily    OLANZapine  10 mg Oral QHS    OLANZapine  5 mg Oral Daily    traZODone  50 mg Oral QHS      PRN Meds:   Current Facility-Administered Medications:     acetaminophen, 650 mg, Oral, Q6H PRN    aluminum-magnesium hydroxide-simethicone, 30 mL, Oral, Q6H PRN    haloperidoL, 10 mg, Oral, Q4H PRN **AND** diphenhydrAMINE, 50 mg, Oral, Q4H PRN **AND** LORazepam, 2 mg, Oral, Q4H PRN **AND** haloperidol lactate, 10 mg, Intramuscular, Q4H PRN **AND** diphenhydrAMINE, 50 mg, Intramuscular, Q4H PRN **AND** lorazepam, 2 mg, Intramuscular, Q4H PRN    hydrOXYzine HCL, 50 mg, Oral, Q4H PRN   Psychotherapeutics (From admission, onward)      Start     Stop Route Frequency Ordered    08/29/24 0900  OLANZapine tablet 5 mg         -- Oral Daily 08/28/24 0942    08/28/24 2100  OLANZapine tablet 10 mg         -- Oral Nightly 08/28/24 0942    08/25/24 2100  traZODone tablet 50 mg         -- Oral Nightly 08/25/24 1909 08/24/24 2152  haloperidoL tablet 10 mg  (Med - Acute  Behavioral Management)        Placed in "And" Linked Group    -- Oral Every 4 hours PRN 08/24/24 2152    08/24/24 2152  LORazepam tablet 2 mg  (Med - Acute  Behavioral Management)        Placed in "And" Linked Group    -- Oral Every 4 hours PRN 08/24/24 2152 08/24/24 2152  haloperidol " "lactate injection 10 mg  (Med - Acute  Behavioral Management)        Placed in "And" Linked Group    -- IM Every 4 hours PRN 08/24/24 2152 08/24/24 2152  LORazepam injection 2 mg  (Med - Acute  Behavioral Management)        Placed in "And" Linked Group    -- IM Every 4 hours PRN 08/24/24 2152            Allergies:   Review of patient's allergies indicates:  No Known Allergies     OBJECTIVE:   Vitals   Vitals:    08/29/24 0700   BP: 138/82   Pulse: 75   Resp: 18   Temp: 97.9 °F (36.6 °C)        Labs/Imaging/Studies:   No results found for this or any previous visit (from the past 36 hour(s)).         Medical Review Of Systems:  Constitutional: negative  Respiratory: negative  Cardiovascular: negative  Gastrointestinal: negative  Genitourinary:negative  Musculoskeletal:negative  Neurological: negative       Psychiatric Mental Status Exam:  General Appearance: appears stated age, well-developed, well-nourished  Arousal: alert  Behavior: cooperative  Movements and Motor Activity: no abnormal involuntary movements noted  Orientation: oriented to person, place, time, and situation  Speech: normal rate, normal rhythm, normal volume, normal tone  Mood: "Ok"  Affect: constricted  Thought Process: linear  Associations: intact  Thought Content and Perceptions: no suicidal ideation, no homicidal ideation, no auditory hallucinations, no visual hallucinations, no paranoid ideation, no ideas of reference  Recent and Remote Memory: recent memory intact, remote memory intact; per interview/observation with patient  Attention and Concentration: intact, attentive to conversation; per interview/observation with patient  Fund of Knowledge: intact, aware of current events, vocabulary appropriate; based on history, vocabulary, fund of knowledge, syntax, grammar, and content  Insight: questionable; based on understanding of severity of illness and HPI  Judgment: questionable; based on patient's behavior and HPI     ASSESSMENT/PLAN: "   Problems Addressed/Diagnoses:  Bipolar Disorder, most recent episode manic, severe, with psychosis (F31.2)  Cannabis use disorder (F12.20)    Past Medical History:   Diagnosis Date    Bipolar disorder     History of psychiatric hospitalization     Hx of psychiatric care     Psychiatric problem         Plan:  Bipolar disorder, chronic with acute exacerbation  -Continue Zyprexa 5mg daily and 10mg HS  -Continue trazodone     Cannabis use, chronic with acute exacerbation  -Group/Individual psychotherapy       Expected Disposition Plan: Home (1-2 days)        Marco Antonio Claudio, CRISTINAP-BC

## 2024-08-30 NOTE — DISCHARGE SUMMARY
"DISCHARGE SUMMARY  PSYCHIATRY      Admit Date: 2024  9:51 PM    Discharge Date:  2024    SITE:   [facility]    Discharge Attending Physician: Elfego Booth M.D.    Chief Complaint:  "I'm doing all right. I just started believing I was God."     History of Present Illness On Admit:   Patient is a 29 y.o. male seen for evaluation. A/M, feels like he is in the hospital because he created earth, sounds, and creation. Has been feeling like this "a couple weeks ago" and feels like this is his reality. Says he can go on and on forever about what his role is in the earth and creation. Says he is still a virgin and hasn't kissed anyone, "I'm single by the way." Lives with mother/brother/sister. Mother is a caretaker from 2 younger children that pt "treats like my children". Feels like he on "factory reset again and again". Feels lightyears away from everyone else. Says he has been inpt at [facility] "so many times, too many to count". Yesterday felt like he was "in hell and wanted to commit suicide, my body is all combined with all this pain and emotion, that's why they strapped me up". "The world is such a bad place, I know it is!" Decreased sleep and appetite. Works at [employer] "I have to pay my bills". 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 Says he has only been "taking medicine at night, but my sister said I was supposed to be taking it in the night and in the morning". Says he ignored all the other medication he has been prescribed "all those bottles I stopped taking I ignored it because it was all for the depression and I'm not depressed". Has a dream to be an actor. Smokes marijuana "every night", and told staff "I hope you can protect the buds because it's the only thing I can trust". Biological father . Has been arrested "many times cus I couldn't control myself". UDS (+) THC, ETOH negative.   Home " Meds: Zyprexa      Admit Mental Status Exam:  Appearance:  Casual, purple hospital scrubs   Behavior:  cooperative, psychomotor agitation, redirectable, restless and fidgety , eye contact normal   Speech:  loud, pressured   Mood:  anxious   Affect:  euphoric, increased in intensity, mood-incongruent, anxious   Thought Process:  blocked, flight of ideas, loose associations, racing, illogical   Thought Content:  delusions: Yes, obsessions: Yes, suicidal thoughts: (active-Yes, passive-Yes), paranoid   Sensorium:  person, place   Cognition:  fund of knowledge 3 of 4 recent presidents and memory > able to remember recent events- Yes, able to remember remote events- No   Insight:  poor   Judgment:  poor         Diagnoses:  PRINCIPAL PROBLEM:  Bipolar I, most recent episode manic, severe with psychotic behavior      PROBLEM LIST    Cannabis dependence, continuous    Bipolar I, most recent episode manic, severe with psychotic behavior        Hospital Course:   Patient was admitted to [facility].    24  Today, he is calmer and states that he is feeling better.  States that it is possible that he used synthetic cannabinoids but did not know if he did.  Tolerating current medication regimen without issues.  Less elevated.  Has been catching up on sleep.  Will continue current treatment plan and will monitor for the need to augment.      UDS: (+) cannabis  Blood alcohol: <10      24  Today, he is calm and states that he is feeling blessed. States that he does not know why he stops taking his medication because every time he restarts them he feels better.  Tolerating current medication regimen without issues.  Less elevated.  Has been sleeping well. Denies any SI/HI or manic symptoms.  Will continue current treatment plan and will monitor for the need to augment.     24  Staff reports that patient has remained elevated.  Recently told staff that he saw his  father when he was high and that he cannot wait to be  "able to give his father flowers.     This morning, he states that he is "blessed".  States that he is going to stop smoking "Madeleine Choi" because it is turning his lips purple.  States that he would like to be discharged tomorrow "with jose, and peace, and lust."  Tolerating current medication regimen without issues.  Will increase Zyprexa today and will monitor progress.      8/29/24  Today patient states that he is feeling good. Staff reports that patient has remained elevated but cooperative and compliant. Patient remains grandiose but I believe he is currently at his baseline based on previous experience with this patient. Tolerating current medication regimen without issues.  Will continue with current POC and plan for discharge tomorrow.       8/30/24  Today patient states that he is feeling "A-1". No overt behavioral issues reported by staff. Patient affect was improved today. Patient seemed more calm and less elevated this morning. Tolerating current medication regimen without issues.  Will continue with current POC and proceed with discharge today.       Current Medications:   Scheduled Meds:    nicotine  1 patch Transdermal Daily    OLANZapine  10 mg Oral QHS    OLANZapine  5 mg Oral Daily    traZODone  50 mg Oral QHS          DISCHARGE EXAMINATION    VITALS   Vitals:    08/27/24 1600 08/27/24 1915 08/28/24 0701 08/29/24 0700   BP: 126/79 119/73 133/88 138/82   BP Location:  Left arm     Pulse: 75 78 75 75   Resp: 18 18 16 18   Temp: 98.6 °F (37 °C) 97.9 °F (36.6 °C) 98 °F (36.7 °C) 97.9 °F (36.6 °C)   TempSrc:  Oral     SpO2: 98% 98% 96% 98%   Weight:       Height:             Discharge Mental Status Exam:  General Appearance: appears stated age, well-developed, well-nourished  Arousal: alert  Behavior: cooperative  Movements and Motor Activity: no abnormal involuntary movements noted  Orientation: oriented to person, place, time, and situation  Speech: normal rate, normal rhythm, normal volume, normal " "tone  Mood: "Ok"  Affect: constricted  Thought Process: linear  Associations: intact  Thought Content and Perceptions: no suicidal ideation, no homicidal ideation, no auditory hallucinations, no visual hallucinations, no paranoid ideation, no ideas of reference  Recent and Remote Memory: recent memory intact, remote memory intact; per interview/observation with patient  Attention and Concentration: intact, attentive to conversation; per interview/observation with patient  Fund of Knowledge: intact, aware of current events, vocabulary appropriate; based on history, vocabulary, fund of knowledge, syntax, grammar, and content  Insight: questionable; based on understanding of severity of illness and HPI  Judgment: questionable; based on patient's behavior and HPI       Discharge Condition:  Stable    Prognosis:  Fair    Justification for multiple antipsychotics:  N/a    Disposition:  discharged to home    Follow-up:   Follow-up Information       Center, [facility] Follow up.    Specialties: Behavioral Health, Psychiatry, Psychology  Why: Pt will utilize walk-in services Mon-Thursday 8-2 and Friday 8-10.   Please bring your id, medicaid card, and discharge papers with you for your appointment.  Contact information:  [place] [address] [phone]               [facility] .    Contact information:  [address] [place], [place] [address] [place] [address] [phone]                             Medication Regimen:    Current Facility-Administered Medications:     acetaminophen tablet 650 mg, 650 mg, Oral, Q6H PRN, Elfego Booth MD    aluminum-magnesium hydroxide-simethicone 200-200-20 mg/5 mL suspension 30 mL, 30 mL, Oral, Q6H PRN, Elfego Booth MD    haloperidoL tablet 10 mg, 10 mg, Oral, Q4H PRN **AND** diphenhydrAMINE capsule 50 mg, 50 mg, Oral, Q4H PRN **AND** LORazepam tablet 2 mg, 2 mg, Oral, Q4H PRN **AND** haloperidol lactate injection 10 mg, 10 mg, " Intramuscular, Q4H PRN **AND** diphenhydrAMINE injection 50 mg, 50 mg, Intramuscular, Q4H PRN **AND** LORazepam injection 2 mg, 2 mg, Intramuscular, Q4H PRN, Elfego Booth MD    hydrOXYzine tablet 50 mg, 50 mg, Oral, Q4H PRN, Elfego Booth MD, 50 mg at 08/30/24 0350    nicotine 21 mg/24 hr 1 patch, 1 patch, Transdermal, Daily, Elfego Booth MD    OLANZapine tablet 10 mg, 10 mg, Oral, QHS, Elfego Booth MD, 10 mg at 08/29/24 2017    OLANZapine tablet 5 mg, 5 mg, Oral, Daily, Elfego Booth MD, 5 mg at 08/29/24 0843    traZODone tablet 50 mg, 50 mg, Oral, QHS, Ani Solitario NP, 50 mg at 08/29/24 2017      Patient Instructions:   Continue medication regimen as prescribed.    Disposition plan per  - see  notes for details.    Patient instructed to call 911 or present to emergency department if any of the following complications develop status post discharge: suicidality, homicidality, or grave disability.     Total time spent discharging patient: <30 minutes      Elfego Booth M.D.

## 2024-08-30 NOTE — PLAN OF CARE
Problem: Adult Behavioral Health Plan of Care  Goal: Plan of Care Review  Outcome: Met  Goal: Patient-Specific Goal (Individualization)  Outcome: Met  Goal: Adheres to Safety Considerations for Self and Others  Outcome: Met  Goal: Absence of New-Onset Illness or Injury  Outcome: Met  Goal: Optimized Coping Skills in Response to Life Stressors  Outcome: Met  Goal: Develops/Participates in Therapeutic [place] to Support Successful Transition  Outcome: Met  Goal: Rounds/Family Conference  Outcome: Met     Problem: Violence Risk or Actual  Goal: Anger and Impulse Control  Outcome: Met     Problem: Excessive Substance Use  Goal: Optimized Energy Level (Excessive Substance Use)  Outcome: Met  Goal: Improved Behavioral Control (Excessive Substance Use)  Outcome: Met  Goal: Increased Participation and Engagement (Excessive Substance Use)  Outcome: Met  Goal: Improved Physiologic Symptoms (Excessive Substance Use)  Outcome: Met  Goal: Enhanced Social, Occupational or Functional Skills (Excessive Substance Use)  Outcome: Met     Problem: Psychotic Signs/Symptoms  Goal: Improved Behavioral Control (Psychotic Signs/Symptoms)  Outcome: Met  Goal: Optimal Cognitive Function (Psychotic Signs/Symptoms)  Outcome: Met  Goal: Increased Participation and Engagement (Psychotic Signs/Symptoms)  Outcome: Met  Goal: Improved Mood Symptoms (Psychotic Signs/Symptoms)  Outcome: Met  Goal: Improved Psychomotor Symptoms (Psychotic Signs/Symptoms)  Outcome: Met  Goal: Decreased Sensory Symptoms (Psychotic Signs/Symptoms)  Outcome: Met  Goal: Improved Sleep (Psychotic Signs/Symptoms)  Outcome: Met  Goal: Enhanced Social, Occupational or Functional Skills (Psychotic Signs/Symptoms)  Outcome: Met   AAO.  Remains anxious.  Denies SI/HI or hallucinations.  Compliant with meds and cooperative with staff.  Unkept.

## 2024-08-30 NOTE — NURSING
Discharge Note:    Jurgen Berg is a 29 y.o. male, : 1995, MRN: [id number], admitted on 2024 for Elfego Booth MD with a diagnosis of Bipolar 1 disorder [F31.9].    Patient discharged on 2024 per physician orders in stable condition. Patient denied suicidal ideation, homicidal ideation, or hallucinations. Patient was discharged with valuables, personal belongings, prescriptions, discharge instructions, and an educational handout explaining the diagnosis and prescribed medications. Patient verbalized understanding of the discharge instructions and importance of follow-up visits. Patient was escorted out of the facility by [facility] and placed into a private vehicle to be transported to home.     Patient discharged on the following medications:     Medication List        START taking these medications      nicotine 21 mg/24 hr  Commonly known as: NICODERM CQ  Place 1 patch onto the skin once daily.     traZODone 50 MG tablet  Commonly known as: DESYREL  Take 1 tablet (50 mg total) by mouth every evening.            CHANGE how you take these medications      * OLANZapine 10 MG tablet  Commonly known as: ZyPREXA  Take 1 tablet (10 mg total) by mouth every evening.  What changed:   medication strength  how much to take  when to take this     * OLANZapine 5 MG tablet  Commonly known as: ZyPREXA  Take 1 tablet (5 mg total) by mouth once daily.  What changed: You were already taking a medication with the same name, and this prescription was added. Make sure you understand how and when to take each.           * This list has 2 medication(s) that are the same as other medications prescribed for you. Read the directions carefully, and ask your doctor or other care provider to review them with you.                   Where to Get Your Medications        You can get these medications from any pharmacy    Bring a paper prescription for each of these medications  nicotine 21 mg/24 hr  OLANZapine 10  MG tablet  OLANZapine 5 MG tablet  traZODone 50 MG tablet

## 2024-08-30 NOTE — NURSING
Pt woke up and began pacing the halls. Offered his PRN atarax and administered this at 0350.     At 0500 pt noted to be resting quietly in bed with eyes closed.

## 2024-08-30 NOTE — PROGRESS NOTES
08/30/24 1000   [facility] Group Therapy   Group Name Therapeutic Recreation   Specific Interventions Skilled Activity Mild Exercises   Participation Level None   Participation Quality Refused  (despite encouragement)

## 2024-10-17 ENCOUNTER — PATIENT MESSAGE (OUTPATIENT)
Dept: RESEARCH | Facility: HOSPITAL | Age: 29
End: 2024-10-17

## 2024-10-22 ENCOUNTER — PATIENT MESSAGE (OUTPATIENT)
Dept: RESEARCH | Facility: HOSPITAL | Age: 29
End: 2024-10-22

## 2024-11-05 ENCOUNTER — PATIENT MESSAGE (OUTPATIENT)
Dept: RESEARCH | Facility: HOSPITAL | Age: 29
End: 2024-11-05

## 2025-06-30 ENCOUNTER — HOSPITAL ENCOUNTER (INPATIENT)
Facility: HOSPITAL | Age: 30
LOS: 7 days | Discharge: HOME | DRG: 885 | End: 2025-07-08
Attending: INTERNAL MEDICINE
Payer: MEDICAID

## 2025-06-30 DIAGNOSIS — M62.82 NON-TRAUMATIC RHABDOMYOLYSIS: Primary | ICD-10-CM

## 2025-06-30 DIAGNOSIS — R07.9 CHEST PAIN: ICD-10-CM

## 2025-06-30 LAB
ACCEPTIBLE SP GR UR QL: <=1.005 (ref 1–1.03)
ALBUMIN SERPL-MCNC: 4.5 G/DL (ref 3.5–5)
ALBUMIN/GLOB SERPL: 1.2 RATIO (ref 1.1–2)
ALP SERPL-CCNC: 56 UNIT/L (ref 40–150)
ALT SERPL-CCNC: 43 UNIT/L (ref 0–55)
AMPHET UR QL SCN: NEGATIVE
ANION GAP SERPL CALC-SCNC: 22 MEQ/L
APAP SERPL-MCNC: <10 UG/ML (ref 10–30)
AST SERPL-CCNC: 154 UNIT/L (ref 11–45)
BACTERIA #/AREA URNS AUTO: NORMAL /HPF
BARBITURATE SCN PRESENT UR: NEGATIVE
BASOPHILS # BLD AUTO: 0.04 X10(3)/MCL
BASOPHILS NFR BLD AUTO: 0.2 %
BENZODIAZ UR QL SCN: NEGATIVE
BILIRUB SERPL-MCNC: 2.1 MG/DL
BILIRUB UR QL STRIP.AUTO: NEGATIVE
BUN SERPL-MCNC: 9.6 MG/DL (ref 8.9–20.6)
CALCIUM SERPL-MCNC: 9.6 MG/DL (ref 8.4–10.2)
CANNABINOIDS UR QL SCN: POSITIVE
CHLORIDE SERPL-SCNC: 103 MMOL/L (ref 98–107)
CK SERPL-CCNC: ABNORMAL U/L (ref 30–200)
CLARITY UR: CLEAR
CO2 SERPL-SCNC: 15 MMOL/L (ref 22–29)
COCAINE UR QL SCN: NEGATIVE
COLOR UR AUTO: ABNORMAL
CREAT SERPL-MCNC: 1.54 MG/DL (ref 0.72–1.25)
CREAT/UREA NIT SERPL: 6
EOSINOPHIL # BLD AUTO: 0 X10(3)/MCL (ref 0–0.9)
EOSINOPHIL NFR BLD AUTO: 0 %
ERYTHROCYTE [DISTWIDTH] IN BLOOD BY AUTOMATED COUNT: 12.6 % (ref 11.5–17)
ETHANOL SERPL-MCNC: <10 MG/DL
FENTANYL UR QL SCN: NEGATIVE
GFR SERPLBLD CREATININE-BSD FMLA CKD-EPI: >60 ML/MIN/1.73/M2
GLOBULIN SER-MCNC: 3.7 GM/DL (ref 2.4–3.5)
GLUCOSE SERPL-MCNC: 130 MG/DL (ref 74–100)
GLUCOSE UR QL STRIP: NEGATIVE
HCO3 UR-SCNC: 22.1 MMOL/L (ref 24–28)
HCT VFR BLD AUTO: 47.4 % (ref 42–52)
HGB BLD-MCNC: 16.4 G/DL (ref 14–18)
HGB UR QL STRIP: ABNORMAL
IMM GRANULOCYTES # BLD AUTO: 0.07 X10(3)/MCL (ref 0–0.04)
IMM GRANULOCYTES NFR BLD AUTO: 0.3 %
KETONES UR QL STRIP: NEGATIVE
LACTATE SERPL-SCNC: 1 MMOL/L (ref 0.5–2.2)
LEUKOCYTE ESTERASE UR QL STRIP: NEGATIVE
LYMPHOCYTES # BLD AUTO: 1.3 X10(3)/MCL (ref 0.6–4.6)
LYMPHOCYTES NFR BLD AUTO: 6.4 %
MCH RBC QN AUTO: 30.3 PG (ref 27–31)
MCHC RBC AUTO-ENTMCNC: 34.6 G/DL (ref 33–36)
MCV RBC AUTO: 87.6 FL (ref 80–94)
MDMA UR QL SCN: NEGATIVE
MONOCYTES # BLD AUTO: 1.5 X10(3)/MCL (ref 0.1–1.3)
MONOCYTES NFR BLD AUTO: 7.4 %
NEUTROPHILS # BLD AUTO: 17.37 X10(3)/MCL (ref 2.1–9.2)
NEUTROPHILS NFR BLD AUTO: 85.7 %
NITRITE UR QL STRIP: NEGATIVE
NRBC BLD AUTO-RTO: 0 %
OPIATES UR QL SCN: NEGATIVE
PCO2 BLDA: 33 MMHG (ref 35–45)
PCP UR QL: NEGATIVE
PH SMN: 7.43 [PH] (ref 7.35–7.45)
PH UR STRIP: 6 [PH]
PH UR: 6 [PH] (ref 3–11)
PLATELET # BLD AUTO: 242 X10(3)/MCL (ref 130–400)
PMV BLD AUTO: 10 FL (ref 7.4–10.4)
PO2 BLDA: 130 MMHG (ref 80–100)
POC BE: -2 MMOL/L (ref -2–2)
POC SATURATED O2: 99 % (ref 95–100)
POC TCO2: 23 MMOL/L (ref 23–27)
POTASSIUM SERPL-SCNC: 3.7 MMOL/L (ref 3.5–5.1)
PROT SERPL-MCNC: 8.2 GM/DL (ref 6.4–8.3)
PROT UR QL STRIP: NEGATIVE
RBC # BLD AUTO: 5.41 X10(6)/MCL (ref 4.7–6.1)
RBC #/AREA URNS AUTO: NORMAL /HPF
SALICYLATES SERPL-MCNC: <5 MG/DL (ref 15–30)
SAMPLE: ABNORMAL
SODIUM SERPL-SCNC: 140 MMOL/L (ref 136–145)
SP GR UR STRIP.AUTO: <=1.005 (ref 1–1.03)
SQUAMOUS #/AREA URNS AUTO: NORMAL /HPF
UROBILINOGEN UR STRIP-ACNC: 0.2
WBC # BLD AUTO: 20.28 X10(3)/MCL (ref 4.5–11.5)
WBC #/AREA URNS AUTO: NORMAL /HPF

## 2025-06-30 PROCEDURE — 82077 ASSAY SPEC XCP UR&BREATH IA: CPT | Performed by: INTERNAL MEDICINE

## 2025-06-30 PROCEDURE — 63600175 PHARM REV CODE 636 W HCPCS: Performed by: INTERNAL MEDICINE

## 2025-06-30 PROCEDURE — 83605 ASSAY OF LACTIC ACID: CPT | Performed by: EMERGENCY MEDICINE

## 2025-06-30 PROCEDURE — 82550 ASSAY OF CK (CPK): CPT | Performed by: INTERNAL MEDICINE

## 2025-06-30 PROCEDURE — 80053 COMPREHEN METABOLIC PANEL: CPT | Performed by: INTERNAL MEDICINE

## 2025-06-30 PROCEDURE — 63600175 PHARM REV CODE 636 W HCPCS: Performed by: EMERGENCY MEDICINE

## 2025-06-30 PROCEDURE — 25000003 PHARM REV CODE 250: Performed by: EMERGENCY MEDICINE

## 2025-06-30 PROCEDURE — 82803 BLOOD GASES ANY COMBINATION: CPT

## 2025-06-30 PROCEDURE — 80307 DRUG TEST PRSMV CHEM ANLYZR: CPT | Performed by: INTERNAL MEDICINE

## 2025-06-30 PROCEDURE — 85025 COMPLETE CBC W/AUTO DIFF WBC: CPT | Performed by: INTERNAL MEDICINE

## 2025-06-30 PROCEDURE — 81003 URINALYSIS AUTO W/O SCOPE: CPT | Performed by: INTERNAL MEDICINE

## 2025-06-30 PROCEDURE — 99900035 HC TECH TIME PER 15 MIN (STAT)

## 2025-06-30 PROCEDURE — G0378 HOSPITAL OBSERVATION PER HR: HCPCS

## 2025-06-30 PROCEDURE — 96374 THER/PROPH/DIAG INJ IV PUSH: CPT

## 2025-06-30 PROCEDURE — 99900031 HC PATIENT EDUCATION (STAT)

## 2025-06-30 PROCEDURE — 87040 BLOOD CULTURE FOR BACTERIA: CPT | Mod: 91 | Performed by: EMERGENCY MEDICINE

## 2025-06-30 PROCEDURE — 80143 DRUG ASSAY ACETAMINOPHEN: CPT | Performed by: INTERNAL MEDICINE

## 2025-06-30 PROCEDURE — 36600 WITHDRAWAL OF ARTERIAL BLOOD: CPT

## 2025-06-30 PROCEDURE — 96372 THER/PROPH/DIAG INJ SC/IM: CPT | Performed by: INTERNAL MEDICINE

## 2025-06-30 PROCEDURE — 99285 EMERGENCY DEPT VISIT HI MDM: CPT | Mod: 25

## 2025-06-30 PROCEDURE — 80179 DRUG ASSAY SALICYLATE: CPT | Performed by: INTERNAL MEDICINE

## 2025-06-30 RX ORDER — SODIUM CHLORIDE 0.9 % (FLUSH) 0.9 %
10 SYRINGE (ML) INJECTION
Status: DISCONTINUED | OUTPATIENT
Start: 2025-06-30 | End: 2025-07-08 | Stop reason: HOSPADM

## 2025-06-30 RX ORDER — CEFTRIAXONE 1 G/1
1 INJECTION, POWDER, FOR SOLUTION INTRAMUSCULAR; INTRAVENOUS
Status: COMPLETED | OUTPATIENT
Start: 2025-06-30 | End: 2025-06-30

## 2025-06-30 RX ORDER — ZIPRASIDONE MESYLATE 20 MG/ML
20 INJECTION, POWDER, LYOPHILIZED, FOR SOLUTION INTRAMUSCULAR
Status: COMPLETED | OUTPATIENT
Start: 2025-06-30 | End: 2025-06-30

## 2025-06-30 RX ORDER — TALC
6 POWDER (GRAM) TOPICAL NIGHTLY PRN
Status: DISCONTINUED | OUTPATIENT
Start: 2025-06-30 | End: 2025-07-01

## 2025-06-30 RX ADMIN — CEFTRIAXONE SODIUM 1 G: 1 INJECTION, POWDER, FOR SOLUTION INTRAMUSCULAR; INTRAVENOUS at 08:06

## 2025-06-30 RX ADMIN — SODIUM CHLORIDE 1000 ML: 9 INJECTION, SOLUTION INTRAVENOUS at 08:06

## 2025-06-30 RX ADMIN — ZIPRASIDONE MESYLATE 20 MG: 20 INJECTION, POWDER, LYOPHILIZED, FOR SOLUTION INTRAMUSCULAR at 05:06

## 2025-06-30 NOTE — ED PROVIDER NOTES
06/30/2025         5:36 PM    Source of History:  History obtained from patient and EMS.     Chief complaint:  From Nurse Triage:  Psychiatric Evaluation (EMS called by family for bizarre behavior; hx of psych; noncompliant with meds; currently denies SI, denies HI; confused, restless)    HISTORY OF PRESENT ILLNES:  Jurgen Berg is a 29 y.o. male  has a past medical history of Bipolar disorder, History of psychiatric hospitalization, psychiatric care, and Psychiatric problem. presenting with Psychiatric Evaluation (EMS called by family for bizarre behavior; hx of psych; noncompliant with meds; currently denies SI, denies HI; confused, restless)      REVIEW OF SYSTEMS:   Constitutional symptoms:     Skin symptoms:      Eye symptoms:     ENMT symptoms:      Respiratory symptoms:      Cardiovascular symptoms:     Gastrointestinal symptoms:      Genitourinary symptoms:     Musculoskeletal symptoms:      Neurologic symptoms:      Psychiatric symptoms:               Additional review of systems information: Patient Denies Any Other Complaints.    All Other Systems Reviewed With Patient And Negative.    ALLEGIES:  Review of patient's allergies indicates:  No Known Allergies    MEDICINE LIST:  Current Outpatient Medications   Medication Instructions    OLANZapine (ZYPREXA) 5 mg, Oral, Daily    traZODone (DESYREL) 50 mg, Oral, Nightly        PMH:  As per HPI and below:    Reviewed and updated in chart.    PAST MEDICAL HISTORY:  Past Medical History:   Diagnosis Date    Bipolar disorder     History of psychiatric hospitalization     Hx of psychiatric care     Psychiatric problem         PAST SURGICAL HISTORY:  History reviewed. No pertinent surgical history.    SOCIAL HISTORY:  Social History[1]    FAMILY HISTORY:  Family History   Problem Relation Name Age of Onset    Heart disease Father      Hypertension Father      Diabetes Father          PROBLEM LIST:  Problem List[2]     PHYSICAL EXAM:      ED Triage Vitals [06/30/25  1725]   BP    Pulse    Resp    Temp 97.7 °F (36.5 °C)   SpO2         Vital Signs: Reviewed As In Chart.  General:  Alert, No Cardiorespiratory Distress Noted.  Head: Normocephalic   Eye:  Pupils equal and reactive to light and accomodation. Extraocular Movements Are Intact.   ENT: Mucus membranes are moist.   Neck: Supple  Cardiovascular:  Regular Rate And Rhythm     Respiratory:  Clear to auscultation bilaterally    Gastrointestinal:  Soft, Non Distended, Non Tenderness, Normal Bowel Sounds.    Neurological:  Alert And Oriented To Person, Place, Time, And Situation, Normal Motor Observed, Normal Speech Observed.  Back: Normal range of motion  Musculoskeletal:  No Gross Deformity Noted.   Genital: deferred    Psychiatric:  Uncooperative, combative denies suicide ideation or homicidal ideation positive delusions Differential diagnosis includes but is not limited to depression, schizophrenia, bipolar disorder, schizoaffective disorder, psychosis and malingering   Skin: warm, dry  Lymphatic: No lymphadenopathy      ED WORKUP FOR MEDICAL DECISION MAKING:    ED ORDERS:  Orders Placed This Encounter   Procedures    CBC auto differential    Comprehensive metabolic panel    Urinalysis, Reflex to Urine Culture Urine, Clean Catch    Drug Screen, Urine    Ethanol    Acetaminophen level    Salicylate level    CBC with Differential    CPK    Diet Adult Regular Safety Tray    Vital signs while awake    Undress patient and allow them to wear facility provided apparel.    Direct Psych Observation    Restraints violent or self-destructive adult (age 18 and older)    PEC/Psych Hold - Physicians Emergency Certificate / 72 Hour Psych Hold       ED MEDICINES:  Medications   ziprasidone injection 20 mg (20 mg Intramuscular Given 6/30/25 1743)                ED LABS ORDERED AND REVIEWED:  Admission on 06/30/2025   Component Date Value Ref Range Status    Sodium 06/30/2025 140  136 - 145 mmol/L Final    Potassium 06/30/2025 3.7  3.5 - 5.1  mmol/L Final    Chloride 06/30/2025 103  98 - 107 mmol/L Final    CO2 06/30/2025 15 (L)  22 - 29 mmol/L Final    Glucose 06/30/2025 130 (H)  74 - 100 mg/dL Final    Blood Urea Nitrogen 06/30/2025 9.6  8.9 - 20.6 mg/dL Final    Creatinine 06/30/2025 1.54 (H)  0.72 - 1.25 mg/dL Final    Calcium 06/30/2025 9.6  8.4 - 10.2 mg/dL Final    Protein Total 06/30/2025 8.2  6.4 - 8.3 gm/dL Final    Albumin 06/30/2025 4.5  3.5 - 5.0 g/dL Final    Globulin 06/30/2025 3.7 (H)  2.4 - 3.5 gm/dL Final    Albumin/Globulin Ratio 06/30/2025 1.2  1.1 - 2.0 ratio Final    Bilirubin Total 06/30/2025 2.1 (H)  <=1.5 mg/dL Final    ALP 06/30/2025 56  40 - 150 unit/L Final    ALT 06/30/2025 43  0 - 55 unit/L Final    AST 06/30/2025 154 (H)  11 - 45 unit/L Final    eGFR 06/30/2025 >60  mL/min/1.73/m2 Final    Anion Gap 06/30/2025 22.0  mEq/L Final    BUN/Creatinine Ratio 06/30/2025 6   Final    Ethanol Level 06/30/2025 <10.0  <=10.0 mg/dL Final    Acetaminophen Level 06/30/2025 <10.0 (L)  10.0 - 30.0 ug/ml Final    Salicylate Level 06/30/2025 <5.0 (L)  15.0 - 30.0 mg/dL Final    WBC 06/30/2025 20.28 (H)  4.50 - 11.50 x10(3)/mcL Final    RBC 06/30/2025 5.41  4.70 - 6.10 x10(6)/mcL Final    Hgb 06/30/2025 16.4  14.0 - 18.0 g/dL Final    Hct 06/30/2025 47.4  42.0 - 52.0 % Final    MCV 06/30/2025 87.6  80.0 - 94.0 fL Final    MCH 06/30/2025 30.3  27.0 - 31.0 pg Final    MCHC 06/30/2025 34.6  33.0 - 36.0 g/dL Final    RDW 06/30/2025 12.6  11.5 - 17.0 % Final    Platelet 06/30/2025 242  130 - 400 x10(3)/mcL Final    MPV 06/30/2025 10.0  7.4 - 10.4 fL Final    Neut % 06/30/2025 85.7  % Final    Lymph % 06/30/2025 6.4  % Final    Mono % 06/30/2025 7.4  % Final    Eos % 06/30/2025 0.0  % Final    Basophil % 06/30/2025 0.2  % Final    Imm Grans % 06/30/2025 0.3  % Final    Neut # 06/30/2025 17.37 (H)  2.1 - 9.2 x10(3)/mcL Final    Lymph # 06/30/2025 1.30  0.6 - 4.6 x10(3)/mcL Final    Mono # 06/30/2025 1.50 (H)  0.1 - 1.3 x10(3)/mcL Final    Eos #  06/30/2025 0.00  0 - 0.9 x10(3)/mcL Final    Baso # 06/30/2025 0.04  <=0.2 x10(3)/mcL Final    Imm Gran # 06/30/2025 0.07 (H)  0.00 - 0.04 x10(3)/mcL Final    NRBC% 06/30/2025 0.0  % Final       RADIOLOGY STUDIES ORDERED AND REVIEWED:  Imaging Results    None         MEDICAL DECISION MAKING:    Jurgen Berg is 29 y.o. male who  has a past medical history of Bipolar disorder, History of psychiatric hospitalization, psychiatric care, and Psychiatric problem. arrives in ER with c/o Psychiatric Evaluation (EMS called by family for bizarre behavior; hx of psych; noncompliant with meds; currently denies SI, denies HI; confused, restless)      Reviewed Nurses Note. Reviewed Vital Signs.     Reviewed Pertinent old records, History and updated as necessary.    Vitals:    06/30/25 1725   BP: 136/87   Pulse: (!) 124   Resp: (!) 26   Temp: 97.7 °F (36.5 °C)        Medical Decision Making  Differential diagnosis includes but is not limited to depression, schizophrenia, bipolar disorder, schizoaffective disorder, psychosis and malingering    Amount and/or Complexity of Data Reviewed  Labs: ordered.    Risk  Prescription drug management.                     Care transferred to Dr. Gagnon at 7:00 p.m.      PROCEDURES PERFORMED IN ED:  Procedures    DIAGNOSTIC IMPRESSION:      No diagnosis found.            Medication List        ASK your doctor about these medications      OLANZapine 5 MG tablet  Commonly known as: ZyPREXA  Take 1 tablet (5 mg total) by mouth once daily.     traZODone 50 MG tablet  Commonly known as: DESYREL  Take 1 tablet (50 mg total) by mouth every evening.                             Bladimir Douglas MD  06/30/25 1822         [1]   Social History  Tobacco Use    Smoking status: Former     Types: Vaping with nicotine     Start date: 2008   Vaping Use    Vaping status: Every Day    Substances: Nicotine    Devices: Pre-filled pod   Substance Use Topics    Alcohol use: Not Currently    Drug use: Yes     Types:  Marijuana   [2]   Patient Active Problem List  Diagnosis    Paranoia    Fatigue    Psychosis    Bipolar 1 disorder    Cannabis dependence, continuous    Bipolar I, most recent episode manic, severe with psychotic behavior        Bladimir Douglas MD  06/30/25 5435

## 2025-07-01 LAB
ALBUMIN SERPL-MCNC: 3.4 G/DL (ref 3.5–5)
ALBUMIN/GLOB SERPL: 1.4 RATIO (ref 1.1–2)
ALP SERPL-CCNC: 46 UNIT/L (ref 40–150)
ALT SERPL-CCNC: 38 UNIT/L (ref 0–55)
ANION GAP SERPL CALC-SCNC: 4 MEQ/L
AST SERPL-CCNC: 140 UNIT/L (ref 11–45)
BASOPHILS # BLD AUTO: 0.04 X10(3)/MCL
BASOPHILS NFR BLD AUTO: 0.3 %
BILIRUB SERPL-MCNC: 1.3 MG/DL
BUN SERPL-MCNC: 7.7 MG/DL (ref 8.9–20.6)
CALCIUM SERPL-MCNC: 7.8 MG/DL (ref 8.4–10.2)
CHLORIDE SERPL-SCNC: 112 MMOL/L (ref 98–107)
CK SERPL-CCNC: ABNORMAL U/L (ref 30–200)
CO2 SERPL-SCNC: 24 MMOL/L (ref 22–29)
CREAT SERPL-MCNC: 0.85 MG/DL (ref 0.72–1.25)
CREAT/UREA NIT SERPL: 9
EOSINOPHIL # BLD AUTO: 0.03 X10(3)/MCL (ref 0–0.9)
EOSINOPHIL NFR BLD AUTO: 0.2 %
ERYTHROCYTE [DISTWIDTH] IN BLOOD BY AUTOMATED COUNT: 12.8 % (ref 11.5–17)
GFR SERPLBLD CREATININE-BSD FMLA CKD-EPI: >60 ML/MIN/1.73/M2
GLOBULIN SER-MCNC: 2.5 GM/DL (ref 2.4–3.5)
GLUCOSE SERPL-MCNC: 132 MG/DL (ref 74–100)
HAV IGM SERPL QL IA: NONREACTIVE
HBV CORE IGM SERPL QL IA: NONREACTIVE
HBV SURFACE AG SERPL QL IA: NONREACTIVE
HCT VFR BLD AUTO: 39.7 % (ref 42–52)
HCV AB SERPL QL IA: NONREACTIVE
HGB BLD-MCNC: 13.5 G/DL (ref 14–18)
IMM GRANULOCYTES # BLD AUTO: 0.05 X10(3)/MCL (ref 0–0.04)
IMM GRANULOCYTES NFR BLD AUTO: 0.4 %
LYMPHOCYTES # BLD AUTO: 2.09 X10(3)/MCL (ref 0.6–4.6)
LYMPHOCYTES NFR BLD AUTO: 15.7 %
MAGNESIUM SERPL-MCNC: 2.2 MG/DL (ref 1.6–2.6)
MCH RBC QN AUTO: 30.3 PG (ref 27–31)
MCHC RBC AUTO-ENTMCNC: 34 G/DL (ref 33–36)
MCV RBC AUTO: 89 FL (ref 80–94)
MONOCYTES # BLD AUTO: 0.9 X10(3)/MCL (ref 0.1–1.3)
MONOCYTES NFR BLD AUTO: 6.8 %
NEUTROPHILS # BLD AUTO: 10.16 X10(3)/MCL (ref 2.1–9.2)
NEUTROPHILS NFR BLD AUTO: 76.6 %
NRBC BLD AUTO-RTO: 0 %
PLATELET # BLD AUTO: 174 X10(3)/MCL (ref 130–400)
PMV BLD AUTO: 10.1 FL (ref 7.4–10.4)
POTASSIUM SERPL-SCNC: 3.7 MMOL/L (ref 3.5–5.1)
PROT SERPL-MCNC: 5.9 GM/DL (ref 6.4–8.3)
RBC # BLD AUTO: 4.46 X10(6)/MCL (ref 4.7–6.1)
SODIUM SERPL-SCNC: 140 MMOL/L (ref 136–145)
TSH SERPL-ACNC: 1.01 UIU/ML (ref 0.35–4.94)
WBC # BLD AUTO: 13.27 X10(3)/MCL (ref 4.5–11.5)

## 2025-07-01 PROCEDURE — 36415 COLL VENOUS BLD VENIPUNCTURE: CPT

## 2025-07-01 PROCEDURE — 63600175 PHARM REV CODE 636 W HCPCS

## 2025-07-01 PROCEDURE — 84443 ASSAY THYROID STIM HORMONE: CPT

## 2025-07-01 PROCEDURE — 82550 ASSAY OF CK (CPK): CPT | Performed by: EMERGENCY MEDICINE

## 2025-07-01 PROCEDURE — 25000003 PHARM REV CODE 250

## 2025-07-01 PROCEDURE — 85025 COMPLETE CBC W/AUTO DIFF WBC: CPT

## 2025-07-01 PROCEDURE — 80053 COMPREHEN METABOLIC PANEL: CPT

## 2025-07-01 PROCEDURE — 80074 ACUTE HEPATITIS PANEL: CPT

## 2025-07-01 PROCEDURE — 11000001 HC ACUTE MED/SURG PRIVATE ROOM

## 2025-07-01 PROCEDURE — 83735 ASSAY OF MAGNESIUM: CPT

## 2025-07-01 RX ORDER — SODIUM CHLORIDE 0.9 % (FLUSH) 0.9 %
10 SYRINGE (ML) INJECTION
Status: DISCONTINUED | OUTPATIENT
Start: 2025-07-01 | End: 2025-07-08 | Stop reason: HOSPADM

## 2025-07-01 RX ORDER — SODIUM CHLORIDE 9 MG/ML
INJECTION, SOLUTION INTRAVENOUS CONTINUOUS
Status: DISCONTINUED | OUTPATIENT
Start: 2025-07-01 | End: 2025-07-01

## 2025-07-01 RX ORDER — SODIUM CHLORIDE 9 MG/ML
INJECTION, SOLUTION INTRAVENOUS CONTINUOUS
Status: ACTIVE | OUTPATIENT
Start: 2025-07-01 | End: 2025-07-01

## 2025-07-01 RX ORDER — RISPERIDONE 1 MG/1
1 TABLET ORAL NIGHTLY
Status: DISCONTINUED | OUTPATIENT
Start: 2025-07-01 | End: 2025-07-01

## 2025-07-01 RX ORDER — GLUCAGON 1 MG
1 KIT INJECTION
Status: DISCONTINUED | OUTPATIENT
Start: 2025-07-01 | End: 2025-07-08 | Stop reason: HOSPADM

## 2025-07-01 RX ORDER — IBUPROFEN 200 MG
16 TABLET ORAL
Status: DISCONTINUED | OUTPATIENT
Start: 2025-07-01 | End: 2025-07-08 | Stop reason: HOSPADM

## 2025-07-01 RX ORDER — OLANZAPINE 5 MG/1
10 TABLET, FILM COATED ORAL DAILY
Status: DISCONTINUED | OUTPATIENT
Start: 2025-07-01 | End: 2025-07-03

## 2025-07-01 RX ORDER — BISACODYL 10 MG/1
10 SUPPOSITORY RECTAL DAILY PRN
Status: DISCONTINUED | OUTPATIENT
Start: 2025-07-01 | End: 2025-07-08 | Stop reason: HOSPADM

## 2025-07-01 RX ORDER — ALUMINUM HYDROXIDE, MAGNESIUM HYDROXIDE, AND SIMETHICONE 1200; 120; 1200 MG/30ML; MG/30ML; MG/30ML
30 SUSPENSION ORAL 4 TIMES DAILY PRN
Status: DISCONTINUED | OUTPATIENT
Start: 2025-07-01 | End: 2025-07-08 | Stop reason: HOSPADM

## 2025-07-01 RX ORDER — LORAZEPAM 2 MG/ML
INJECTION INTRAMUSCULAR
Status: DISPENSED
Start: 2025-07-01 | End: 2025-07-02

## 2025-07-01 RX ORDER — ACETAMINOPHEN 325 MG/1
650 TABLET ORAL EVERY 4 HOURS PRN
Status: DISCONTINUED | OUTPATIENT
Start: 2025-07-01 | End: 2025-07-08 | Stop reason: HOSPADM

## 2025-07-01 RX ORDER — ZIPRASIDONE MESYLATE 20 MG/ML
10 INJECTION, POWDER, LYOPHILIZED, FOR SOLUTION INTRAMUSCULAR EVERY 12 HOURS PRN
Status: DISCONTINUED | OUTPATIENT
Start: 2025-07-01 | End: 2025-07-08 | Stop reason: HOSPADM

## 2025-07-01 RX ORDER — TALC
6 POWDER (GRAM) TOPICAL NIGHTLY PRN
Status: DISCONTINUED | OUTPATIENT
Start: 2025-07-01 | End: 2025-07-08 | Stop reason: HOSPADM

## 2025-07-01 RX ORDER — IBUPROFEN 200 MG
24 TABLET ORAL
Status: DISCONTINUED | OUTPATIENT
Start: 2025-07-01 | End: 2025-07-08 | Stop reason: HOSPADM

## 2025-07-01 RX ORDER — POLYETHYLENE GLYCOL 3350 17 G/17G
17 POWDER, FOR SOLUTION ORAL 2 TIMES DAILY PRN
Status: DISCONTINUED | OUTPATIENT
Start: 2025-07-01 | End: 2025-07-08 | Stop reason: HOSPADM

## 2025-07-01 RX ORDER — SODIUM CHLORIDE 9 MG/ML
INJECTION, SOLUTION INTRAVENOUS CONTINUOUS
Status: DISCONTINUED | OUTPATIENT
Start: 2025-07-01 | End: 2025-07-02

## 2025-07-01 RX ORDER — TRAZODONE HYDROCHLORIDE 50 MG/1
50 TABLET ORAL NIGHTLY
Status: DISCONTINUED | OUTPATIENT
Start: 2025-07-01 | End: 2025-07-08 | Stop reason: HOSPADM

## 2025-07-01 RX ADMIN — SODIUM CHLORIDE: 9 INJECTION, SOLUTION INTRAVENOUS at 05:07

## 2025-07-01 RX ADMIN — OLANZAPINE 10 MG: 5 TABLET, FILM COATED ORAL at 11:07

## 2025-07-01 RX ADMIN — SODIUM CHLORIDE: 9 INJECTION, SOLUTION INTRAVENOUS at 08:07

## 2025-07-01 RX ADMIN — TRAZODONE HYDROCHLORIDE 50 MG: 50 TABLET ORAL at 08:07

## 2025-07-01 RX ADMIN — LORAZEPAM 1 MG: 2 INJECTION INTRAMUSCULAR; INTRAVENOUS at 02:07

## 2025-07-01 RX ADMIN — SODIUM CHLORIDE: 9 INJECTION, SOLUTION INTRAVENOUS at 11:07

## 2025-07-01 RX ADMIN — SODIUM CHLORIDE: 9 INJECTION, SOLUTION INTRAVENOUS at 02:07

## 2025-07-01 RX ADMIN — LORAZEPAM 1 MG: 2 INJECTION INTRAMUSCULAR; INTRAVENOUS at 03:07

## 2025-07-01 NOTE — H&P
"[facility] Hospital Medicine History & Physical Examination       Patient Name: Jurgen Berg  MRN: [id number]  Patient Class: IP- Inpatient   Admission Date: 6/30/2025   Admitting Physician: TIA Service   Length of Stay: 0  Attending Physician: Julio C Burroughs MD  Primary Care Provider: No primary care provider on file.  Face-to-Face encounter date: 07/01/2025  Code Status: Full code  Chief Complaint: Psychiatric Evaluation (EMS called by family for bizarre behavior; hx of psych; noncompliant with meds; currently denies SI, denies HI; confused, restless)      Screening for Social Drivers for health:  Patient screened for food insecurity, housing instability, transportation needs, utility difficulties, and interpersonal safety (select all that apply as identified as concern)  []Housing or Food  []Transportation Needs  []Utility Difficulties  []Interpersonal safety  [x]None      Patient information was obtained from patient, patient's family, past medical records and ER records.  ED records were reviewed in detail and documented below    HISTORY OF PRESENT ILLNESS:   29 year old man with a PMHx of Bipolar disorder was brought to the hospital by family due to "bizarre behavior". Vital signs grossly normal on admission. Of note patient was agitated and had briefly required restraints. WBC 20.28 and improving. Cr 1.54 on admission. AG 22 with bicarb 15. Lactate normal. CPK 11,284. . Utox positive for marijuana. UA without signs of infection.     I saw the patient in room 515 this morning. He reports that he feels like God is speaking to him and is in him. He denies auditory, tactile or visual hallucinations. He reports feeling full of energy and gambling. Lives at home with his parents. He works in a restaurant. Denies suicidal or homicidal ideations. He denies substance use apart from marijuana. Also does vaping.       PAST MEDICAL HISTORY:     Past Medical History:   Diagnosis " Date    Bipolar disorder     History of psychiatric hospitalization     Hx of psychiatric care     Psychiatric problem        PAST SURGICAL HISTORY:   History reviewed. No pertinent surgical history.    ALLERGIES:   Patient has no known allergies.    FAMILY HISTORY:   Reviewed and negative    SOCIAL HISTORY:     Social History     Tobacco Use    Smoking status: Former     Types: Vaping with nicotine     Start date: 2008    Smokeless tobacco: Not on file   Substance Use Topics    Alcohol use: Not Currently        HOME MEDICATIONS:     Prior to Admission medications    Medication Sig Start Date End Date Taking? Authorizing Provider   OLANZapine (ZYPREXA) 5 MG tablet Take 1 tablet (5 mg total) by mouth once daily. 8/30/24 9/29/24  Marco Antonio Claudio PMHNP   traZODone (DESYREL) 50 MG tablet Take 1 tablet (50 mg total) by mouth every evening. 8/29/24 9/28/24  Marco Antonio Claudio PMHNP       REVIEW OF SYSTEMS:   Except as documented, all other systems reviewed and negative     PHYSICAL EXAM:     VITAL SIGNS: 24 HRS MIN & MAX LAST   Temp  Min: 97.5 °F (36.4 °C)  Max: 98.2 °F (36.8 °C) 97.8 °F (36.6 °C)   BP  Min: 105/67  Max: 136/87 115/73   Pulse  Min: 60  Max: 124  69   Resp  Min: 16  Max: 26 18   SpO2  Min: 96 %  Max: 98 % 98 %     General appearance: Well-developed, well-nourished male in no apparent distress.  HENT: Atraumatic head. Moist mucous membranes of oral cavity.  Eyes: Normal extraocular movements.   Neck: Supple.   Lungs: Clear to auscultation bilaterally. No wheezing present.   Heart: Regular rate and rhythm. S1 and S2 present with no murmurs/gallop/rub. No pedal edema. No JVD present.   Abdomen: Soft, non-distended, non-tender. No rebound tenderness/guarding. Bowel sounds are normal.   Extremities: No cyanosis, clubbing, or edema.  Skin: No Rash.   Neuro: Motor and sensory exams grossly intact. Good tone. Muscle strength 5/5 in all 4 extremities     LABS AND IMAGING:     Recent Labs   Lab 06/30/25  3344  07/01/25 0439   WBC 20.28* 13.27*   RBC 5.41 4.46*   HGB 16.4 13.5*   HCT 47.4 39.7*   MCV 87.6 89.0   MCH 30.3 30.3   MCHC 34.6 34.0   RDW 12.6 12.8    174   MPV 10.0 10.1       Recent Labs   Lab 06/30/25 1746 06/30/25 2004 07/01/25 0439     --  140   K 3.7  --  3.7     --  112*   CO2 15*  --  24   BUN 9.6  --  7.7*   CREATININE 1.54*  --  0.85   *  --  132*   CALCIUM 9.6  --  7.8*   PH  --  7.434  --    MG  --   --  2.20   ALBUMIN 4.5  --  3.4*   PROT 8.2  --  5.9*   ALKPHOS 56  --  46   ALT 43  --  38   *  --  140*   BILITOT 2.1*  --  1.3       Microbiology Results (last 7 days)       Procedure Component Value Units Date/Time    Blood culture #2 **CANNOT BE ORDERED STAT** [[id number]] Collected: 06/30/25 1951    Order Status: Resulted Specimen: Blood from Wrist, Left Updated: 06/30/25 1954    Blood culture #1 **CANNOT BE ORDERED STAT** [[id number]] Collected: 06/30/25 1946    Order Status: Resulted Specimen: Blood from Antecubital, Right Updated: 06/30/25 1953             No image results found.      ASSESSMENT & PLAN:   # Bipolar disorder with psychotic features  # Leukocytosis   # ZELDA - resolved  # AGMA - resolved  # Rhabdomyolysis   # Elevated AST    - IV fluids  - follow up Bcx  - psych consult  - hepatitis panel  - RUQ US  - CBC and CMP in AM  - monitor CK  - obtain TSH  - PEC as per psych  - discharge to psych inpatient once medically cleared likely in the next 24-48 hours    ADDENDUM - around 3:15pm patient became agitated and started running around screaming. Was able to be verbally de-escalated and also given IV 0.5 mg ativan. No need for physical restraints. Will have PRN for agitation as needed      VTE Prophylaxis:  lovenox    Patient condition:  Stable    Prolonged care - I spent 120 mins on this admission    Julio C Burroughs MD  Department of Hospital Medicine   [facility]   07/01/2025      __________________________________________________________________________  INPATIENT LIST OF MEDICATIONS     Scheduled Meds:   OLANZapine  10 mg Oral Daily    traZODone  50 mg Oral QHS     Continuous Infusions:   0.9% NaCl   Intravenous Continuous         PRN Meds:.  Current Facility-Administered Medications:     acetaminophen, 650 mg, Oral, Q4H PRN    aluminum-magnesium hydroxide-simethicone, 30 mL, Oral, QID PRN    bisacodyL, 10 mg, Rectal, Daily PRN    dextrose 50%, 12.5 g, Intravenous, PRN    dextrose 50%, 25 g, Intravenous, PRN    glucagon (human recombinant), 1 mg, Intramuscular, PRN    glucose, 16 g, Oral, PRN    glucose, 24 g, Oral, PRN    melatonin, 6 mg, Oral, Nightly PRN    polyethylene glycol, 17 g, Oral, BID PRN    sodium chloride 0.9%, 10 mL, Intravenous, PRN    sodium chloride 0.9%, 10 mL, Intravenous, PRN      07/01/2025    ________________________________________________________________________________      Julio C Burroughs MD   07/01/2025

## 2025-07-01 NOTE — PLAN OF CARE
Problem: Fall Injury Risk  Goal: Absence of Fall and Fall-Related Injury  Outcome: Progressing     Problem: Adult Inpatient Plan of Care  Goal: Plan of Care Review  Outcome: Progressing  Goal: Patient-Specific Goal (Individualized)  Outcome: Progressing  Goal: Absence of Hospital-Acquired Illness or Injury  Outcome: Progressing  Goal: Optimal Comfort and Wellbeing  Outcome: Progressing  Goal: Readiness for Transition of Care  Outcome: Progressing     Problem: Fluid Volume Deficit  Goal: Fluid Balance  Outcome: Progressing     Problem: Disruptive Behavior  Goal: Improved Impulse and Aggression Control (Disruptive Behavior)  Outcome: Progressing  Goal: Improved Mood Symptoms (Disruptive Behavior)  Outcome: Progressing  Goal: Improved Sleep (Disruptive Behavior)  Outcome: Progressing  Goal: Enhanced Social, Occupational or Functional Skills (Disruptive Behavior)  Outcome: Progressing     Problem: Anxiety Signs/Symptoms  Goal: Optimized Energy Level (Anxiety Signs/Symptoms)  Outcome: Progressing  Goal: Optimized Cognitive Function (Anxiety Signs/Symptoms)  Outcome: Progressing  Goal: Improved Mood Symptoms (Anxiety Signs/Symptoms)  Outcome: Progressing  Goal: Improved Sleep (Anxiety Signs/Symptoms)  Outcome: Progressing  Goal: Enhanced Social, Occupational or Functional Skills (Anxiety Signs/Symptoms)  Outcome: Progressing  Goal: Improved Somatic Symptoms (Anxiety Signs/Symptoms)  Outcome: Progressing

## 2025-07-01 NOTE — NURSING
At appromately 1520, patient became agitated about IV fluids. He begin yelling and punching on the sofa and bed. Patient was able to be calmed down by security and Dr. Burroughs. Dr. Burroughs gave orders for Ativan 1 mg IVP once. As this nurse was pushing medication through IV, Dr. Burroughs gave verbal order to only give 0.5 mg. At this time, 0.5 mg was pushed as ordered and the remainder 0.5 mg was wasted with BETH Barton Manager.

## 2025-07-01 NOTE — NURSING
Patient received from [facility] via ambulance, on arrival patient appears to be very agitated, fidgety, hallucinating, yelling, not settling, shaking, NP hospitalist and prescribed one dose of Ativan and given, patient settled afterwards, IV fluids started, unable to complete assessment as patient not answering questions or just mumbling.

## 2025-07-01 NOTE — CONSULTS
"7/1/2025  Jurgen Berg   1995   [id number]            Psychiatry Initial Consult Note   Date of Admission: 6/30/2025  5:25 PM    Current Legal Status: Physician's Emergency Certificate    Chief Complaint: Psychiatric consult for "bizarre behavior with hallucinations"    SUBJECTIVE:   History of Present Illness:   Jurgen Berg is a 29 y.o. male with a history of bipolar disorder who presented to [facility] ED yesterday (06/30/25) after family called EMS for bizarre behavior and non-adherence with medications. On arrival he was documented to be agitated, hallucinating, yelling, and shaking. WBC 20.28 and improving. Cr 1.54 on admission. AG 22 with bicarb 15. Lactate normal. CPK 11,284. . Utox positive for marijuana. UA without signs of infection. He reports that he was last inpatient in August and has not taken his medications for several months. States he feels "manic" and currently displays a euthymic affect. Displays grandiose and Latter day delusions. Displays some confusion and reports recently impaired sleep but unable to report how long he has been suffering with poor sleep. He denies SI, HI, or auditory/visual hallucinations.         Past Psychiatric History:   Previous Psychiatric Hospitalizations: Multiple   Previous Medication Trials: Abilify, Olanzapine, Trazodone  Previous Suicide Attempts: Denies   Outpatient psychiatrist: None     Current Medications:   Home Psychiatric Meds: None     Past Medical/Surgical History:   Past Medical History:   Diagnosis Date    Bipolar disorder     History of psychiatric hospitalization     Hx of psychiatric care     Psychiatric problem      History reviewed. No pertinent surgical history.      Family Psychiatric History:   Denies     Allergies:   Review of patient's allergies indicates:  No Known Allergies    Substance Abuse History:   Tobacco: Reports nicotine vape  Alcohol: Denies  Illicit Substances: Endorses THC vape and synthetic use  Treatment: " Denies        Scheduled Meds:    risperiDONE  1 mg Oral QHS      PRN Meds:   Current Facility-Administered Medications:     acetaminophen, 650 mg, Oral, Q4H PRN    aluminum-magnesium hydroxide-simethicone, 30 mL, Oral, QID PRN    bisacodyL, 10 mg, Rectal, Daily PRN    dextrose 50%, 12.5 g, Intravenous, PRN    dextrose 50%, 25 g, Intravenous, PRN    glucagon (human recombinant), 1 mg, Intramuscular, PRN    glucose, 16 g, Oral, PRN    glucose, 24 g, Oral, PRN    melatonin, 6 mg, Oral, Nightly PRN    polyethylene glycol, 17 g, Oral, BID PRN    sodium chloride 0.9%, 10 mL, Intravenous, PRN    sodium chloride 0.9%, 10 mL, Intravenous, PRN   Psychotherapeutics (From admission, onward)      Start     Stop Route Frequency Ordered    07/01/25 2100  risperiDONE tablet 1 mg         -- Oral Nightly 07/01/25 0845              Social History:  Housing Status: Lives with mother  Relationship Status/Sexual Orientation: Single   Children: 0  Education: Associates degree  Employment Status/Info: Works as [employer]    history: Denies  History of physical/sexual abuse: Denies   Access to gun: Denies      Legal History:   Past Charges/Incarcerations: Denies   Pending charges: Denies      OBJECTIVE:       Vitals   Vitals:    07/01/25 1035   BP: 115/73   Pulse: 69   Resp: 18   Temp: 97.8 °F (36.6 °C)        Labs/Imaging/Studies:   Recent Results (from the past 48 hours)   Comprehensive metabolic panel    Collection Time: 06/30/25  5:46 PM   Result Value Ref Range    Sodium 140 136 - 145 mmol/L    Potassium 3.7 3.5 - 5.1 mmol/L    Chloride 103 98 - 107 mmol/L    CO2 15 (L) 22 - 29 mmol/L    Glucose 130 (H) 74 - 100 mg/dL    Blood Urea Nitrogen 9.6 8.9 - 20.6 mg/dL    Creatinine 1.54 (H) 0.72 - 1.25 mg/dL    Calcium 9.6 8.4 - 10.2 mg/dL    Protein Total 8.2 6.4 - 8.3 gm/dL    Albumin 4.5 3.5 - 5.0 g/dL    Globulin 3.7 (H) 2.4 - 3.5 gm/dL    Albumin/Globulin Ratio 1.2 1.1 - 2.0 ratio    Bilirubin Total 2.1 (H) <=1.5 mg/dL    ALP 56  40 - 150 unit/L    ALT 43 0 - 55 unit/L     (H) 11 - 45 unit/L    eGFR >60 mL/min/1.73/m2    Anion Gap 22.0 mEq/L    BUN/Creatinine Ratio 6    Ethanol    Collection Time: 06/30/25  5:46 PM   Result Value Ref Range    Ethanol Level <10.0 <=10.0 mg/dL   Acetaminophen level    Collection Time: 06/30/25  5:46 PM   Result Value Ref Range    Acetaminophen Level <10.0 (L) 10.0 - 30.0 ug/ml   Salicylate level    Collection Time: 06/30/25  5:46 PM   Result Value Ref Range    Salicylate Level <5.0 (L) 15.0 - 30.0 mg/dL   CBC with Differential    Collection Time: 06/30/25  5:46 PM   Result Value Ref Range    WBC 20.28 (H) 4.50 - 11.50 x10(3)/mcL    RBC 5.41 4.70 - 6.10 x10(6)/mcL    Hgb 16.4 14.0 - 18.0 g/dL    Hct 47.4 42.0 - 52.0 %    MCV 87.6 80.0 - 94.0 fL    MCH 30.3 27.0 - 31.0 pg    MCHC 34.6 33.0 - 36.0 g/dL    RDW 12.6 11.5 - 17.0 %    Platelet 242 130 - 400 x10(3)/mcL    MPV 10.0 7.4 - 10.4 fL    Neut % 85.7 %    Lymph % 6.4 %    Mono % 7.4 %    Eos % 0.0 %    Basophil % 0.2 %    Imm Grans % 0.3 %    Neut # 17.37 (H) 2.1 - 9.2 x10(3)/mcL    Lymph # 1.30 0.6 - 4.6 x10(3)/mcL    Mono # 1.50 (H) 0.1 - 1.3 x10(3)/mcL    Eos # 0.00 0 - 0.9 x10(3)/mcL    Baso # 0.04 <=0.2 x10(3)/mcL    Imm Gran # 0.07 (H) 0.00 - 0.04 x10(3)/mcL    NRBC% 0.0 %   CPK    Collection Time: 06/30/25  5:46 PM   Result Value Ref Range    Creatine Kinase 11,284 (H) 30 - 200 U/L   Urinalysis, Reflex to Urine Culture Urine, Clean Catch    Collection Time: 06/30/25  6:56 PM    Specimen: Urine   Result Value Ref Range    Color, UA Straw Yellow, Light-Yellow, Dark Yellow, Theresa, Straw    Appearance, UA Clear Clear    Specific Gravity, UA <=1.005 1.005 - 1.030    pH, UA 6.0 5.0 - 8.5    Protein, UA Negative Negative    Glucose, UA Negative Negative, Normal    Ketones, UA Negative Negative    Blood, UA Moderate (A) Negative    Bilirubin, UA Negative Negative    Urobilinogen, UA 0.2 0.2, 1.0, Normal    Nitrites, UA Negative Negative    Leukocyte  Esterase, UA Negative Negative   Drug Screen, Urine    Collection Time: 06/30/25  6:56 PM   Result Value Ref Range    Amphetamines, Urine Negative Negative    Barbiturates, Urine Negative Negative    Benzodiazepine, Urine Negative Negative    Cannabinoids, Urine Positive (A) Negative    Cocaine, Urine Negative Negative    Fentanyl, Urine Negative Negative    MDMA, Urine Negative Negative    Opiates, Urine Negative Negative    Phencyclidine, Urine Negative Negative    pH, Urine 6.0 3.0 - 11.0    Specific Gravity, Urine Auto <=1.005 1.001 - 1.035   Urinalysis, Microscopic    Collection Time: 06/30/25  6:56 PM   Result Value Ref Range    Bacteria, UA None Seen None Seen, Rare, Occasional /HPF    RBC, UA 3-5 None Seen, 0-2, 3-5, 0-5 /HPF    WBC, UA None Seen None Seen, 0-2, 3-5, 0-5 /HPF    Squamous Epithelial Cells, UA None Seen None Seen, Rare, Occasional, Occ /HPF   Lactic acid, plasma    Collection Time: 06/30/25  7:46 PM   Result Value Ref Range    Lactic Acid Level 1.0 0.5 - 2.2 mmol/L   ISTAT PROCEDURE    Collection Time: 06/30/25  8:04 PM   Result Value Ref Range    POC PH 7.434 7.35 - 7.45    POC PCO2 33.0 (L) 35 - 45 mmHg    POC PO2 130 (H) 80 - 100 mmHg    POC HCO3 22.1 (L) 24 - 28 mmol/L    POC BE -2 -2 to 2 mmol/L    POC SATURATED O2 99 95 - 100 %    POC TCO2 23 23 - 27 mmol/L    Sample ARTERIAL    CPK    Collection Time: 07/01/25 12:33 AM   Result Value Ref Range    Creatine Kinase 11,864 (H) 30 - 200 U/L   Comprehensive Metabolic Panel (CMP)    Collection Time: 07/01/25  4:39 AM   Result Value Ref Range    Sodium 140 136 - 145 mmol/L    Potassium 3.7 3.5 - 5.1 mmol/L    Chloride 112 (H) 98 - 107 mmol/L    CO2 24 22 - 29 mmol/L    Glucose 132 (H) 74 - 100 mg/dL    Blood Urea Nitrogen 7.7 (L) 8.9 - 20.6 mg/dL    Creatinine 0.85 0.72 - 1.25 mg/dL    Calcium 7.8 (L) 8.4 - 10.2 mg/dL    Protein Total 5.9 (L) 6.4 - 8.3 gm/dL    Albumin 3.4 (L) 3.5 - 5.0 g/dL    Globulin 2.5 2.4 - 3.5 gm/dL    Albumin/Globulin  "Ratio 1.4 1.1 - 2.0 ratio    Bilirubin Total 1.3 <=1.5 mg/dL    ALP 46 40 - 150 unit/L    ALT 38 0 - 55 unit/L     (H) 11 - 45 unit/L    eGFR >60 mL/min/1.73/m2    Anion Gap 4.0 mEq/L    BUN/Creatinine Ratio 9    Magnesium    Collection Time: 07/01/25  4:39 AM   Result Value Ref Range    Magnesium Level 2.20 1.60 - 2.60 mg/dL   CBC with Differential    Collection Time: 07/01/25  4:39 AM   Result Value Ref Range    WBC 13.27 (H) 4.50 - 11.50 x10(3)/mcL    RBC 4.46 (L) 4.70 - 6.10 x10(6)/mcL    Hgb 13.5 (L) 14.0 - 18.0 g/dL    Hct 39.7 (L) 42.0 - 52.0 %    MCV 89.0 80.0 - 94.0 fL    MCH 30.3 27.0 - 31.0 pg    MCHC 34.0 33.0 - 36.0 g/dL    RDW 12.8 11.5 - 17.0 %    Platelet 174 130 - 400 x10(3)/mcL    MPV 10.1 7.4 - 10.4 fL    Neut % 76.6 %    Lymph % 15.7 %    Mono % 6.8 %    Eos % 0.2 %    Basophil % 0.3 %    Imm Grans % 0.4 %    Neut # 10.16 (H) 2.1 - 9.2 x10(3)/mcL    Lymph # 2.09 0.6 - 4.6 x10(3)/mcL    Mono # 0.90 0.1 - 1.3 x10(3)/mcL    Eos # 0.03 0 - 0.9 x10(3)/mcL    Baso # 0.04 <=0.2 x10(3)/mcL    Imm Gran # 0.05 (H) 0.00 - 0.04 x10(3)/mcL    NRBC% 0.0 %      No results found for: "PHENYTOIN", "PHENOBARB", "VALPROATE", "CBMZ"        Psychiatric Mental Status Exam:  General Appearance: appears stated age, dressed in hospital garb, lying in bed, in no acute distress  Arousal: alert  Behavior: cooperative, polite, appropriate eye-contact, under good behavioral control  Movements and Motor Activity: no tics, no tremors, no akathisia, no dystonia, no evidence of tardive dyskinesia  Orientation: oriented to person, place, and time  Speech: normal rate, rhythm, volume, tone and pitch  Mood: Euthymic  Affect: euthymic, reactive  Thought Process: goal-directed  Associations: no loosening of associations  Thought Content and Perceptions: no suicidal ideation, no homicidal ideation, + delusions, no hallucinations  Recent and Remote Memory: mild impairments noted; per interview/observation with patient  Attention " and Concentration: grossly intact; per interview/observation with patient  Fund of Knowledge: grossly intact; based on history, vocabulary, fund of knowledge, syntax, grammar, and content  Insight: limited; based on understanding of severity of illness and HPI  Judgment: limited; based on patient's behavior and HPI        ASSESSMENT/PLAN:   Diagnoses:  Bipolar I Disorder, Current episode manic, severe with psychotic features    Past Medical History:   Diagnosis Date    Bipolar disorder     History of psychiatric hospitalization     Hx of psychiatric care     Psychiatric problem           Problem lists and Management Plans:  Medication Management  Olanzapine 10mg PO QD  Trazodone 50mg PO QHS  Legal  Continue PEC  Disposition  Recommend inpatient psychiatric hospitalization  Psychiatry will continue to follow          Bello Booker

## 2025-07-01 NOTE — PLAN OF CARE
07/01/25 1228   Discharge Assessment   Assessment Type Discharge Planning Assessment   Confirmed/corrected address, phone number and insurance Yes   Confirmed Demographics Correct on Facesheet   Source of Information patient   Communicated KARLY with patient/caregiver Yes   People in Home sibling(s);parent(s)   Name(s) of People in Home Tj Berg (brother) Isaac Delgado (mother)   Do you expect to return to your current living situation? Yes   Do you have help at home or someone to help you manage your care at home? Yes   Who are your caregiver(s) and their phone number(s)? Family   Prior to hospitilization cognitive status: Unable to Assess   Current cognitive status: Alert/Oriented   Walking or Climbing Stairs Difficulty no   Dressing/Bathing Difficulty no   Home Accessibility stairs within home   Home Layout Bedroom on 2nd floor   Equipment Currently Used at Home none   Patient currently being followed by outpatient case management? No   Do you currently have service(s) that help you manage your care at home? No   Do you have prescription coverage? Yes   Who is going to help you get home at discharge? TBD   How do you get to doctors appointments? family or friend will provide;car, drives self   Are you on dialysis? No   Do you take coumadin? No   Discharge Plan A Psychiatric hospital   Discharge Plan B Psychiatric hospital   DME Needed Upon Discharge  none   Discharge Plan discussed with: Patient   Transition of Care Barriers None     Patient is PEC'd. Plan to place at [facility] tomorrow once medically ready. First choice [facility]. If unable to accept will have [facility] to the next accepting facility locally.

## 2025-07-01 NOTE — ED PROVIDER NOTES
Encounter Date: 6/30/2025       History     Chief Complaint   Patient presents with    Psychiatric Evaluation     EMS called by family for bizarre behavior; hx of psych; noncompliant with meds; currently denies SI, denies HI; confused, restless     HPI  Review of patient's allergies indicates:  No Known Allergies  Past Medical History:   Diagnosis Date    Bipolar disorder     History of psychiatric hospitalization     Hx of psychiatric care     Psychiatric problem      History reviewed. No pertinent surgical history.  Family History   Problem Relation Name Age of Onset    Heart disease Father      Hypertension Father      Diabetes Father       Social History[1]  Review of Systems    Physical Exam     Initial Vitals [06/30/25 1725]   BP Pulse Resp Temp SpO2   136/87 (!) 124 (!) 26 97.7 °F (36.5 °C) 98 %      MAP       --         Physical Exam    ED Course   Procedures  Labs Reviewed   COMPREHENSIVE METABOLIC PANEL - Abnormal       Result Value    Sodium 140      Potassium 3.7      Chloride 103      CO2 15 (*)     Glucose 130 (*)     Blood Urea Nitrogen 9.6      Creatinine 1.54 (*)     Calcium 9.6      Protein Total 8.2      Albumin 4.5      Globulin 3.7 (*)     Albumin/Globulin Ratio 1.2      Bilirubin Total 2.1 (*)     ALP 56      ALT 43       (*)     eGFR >60      Anion Gap 22.0      BUN/Creatinine Ratio 6     URINALYSIS, REFLEX TO URINE CULTURE - Abnormal    Color, UA Straw      Appearance, UA Clear      Specific Gravity, UA <=1.005      pH, UA 6.0      Protein, UA Negative      Glucose, UA Negative      Ketones, UA Negative      Blood, UA Moderate (*)     Bilirubin, UA Negative      Urobilinogen, UA 0.2      Nitrites, UA Negative      Leukocyte Esterase, UA Negative     DRUG SCREEN, URINE (BEAKER) - Abnormal    Amphetamines, Urine Negative      Barbiturates, Urine Negative      Benzodiazepine, Urine Negative      Cannabinoids, Urine Positive (*)     Cocaine, Urine Negative      Fentanyl, Urine Negative       MDMA, Urine Negative      Opiates, Urine Negative      Phencyclidine, Urine Negative      pH, Urine 6.0      Specific Gravity, Urine Auto <=1.005      Narrative:     Cut off concentrations:    Amphetamines - 1000 ng/ml  Barbiturates - 200 ng/ml  Benzodiazepine - 200 ng/ml  Cannabinoids (THC) - 50 ng/ml  Cocaine - 300 ng/ml  Fentanyl - 1.0 ng/ml  MDMA - 500 ng/ml  Opiates - 300 ng/ml   Phencyclidine (PCP) - 25 ng/ml    Specimen submitted for drug analysis and tested for pH and specific gravity in order to evaluate sample integrity. Suspect tampering if specific gravity is <1.003 and/or pH is not within the range of 4.5 - 8.0  False negatives may result form substances such as bleach added to urine.  False positives may result for the presence of a substance with similar chemical structure to the drug or its metabolite.    This test provides only a PRELIMINARY analytical test result. A more specific alternate chemical method must be used in order to obtain a confirmed analytical result. Gas chromatography/mass spectrometry (GC/MS) is the preferred confirmatory method. Other chemical confirmation methods are available. Clinical consideration and professional judgement should be applied to any drug of abuse test result, particularly when preliminary positive results are used.    Positive results will be confirmed only at the physicians request. Unconfirmed screening results are to be used only for medical purposes (treatment).        ACETAMINOPHEN LEVEL - Abnormal    Acetaminophen Level <10.0 (*)    SALICYLATE LEVEL - Abnormal    Salicylate Level <5.0 (*)    CBC WITH DIFFERENTIAL - Abnormal    WBC 20.28 (*)     RBC 5.41      Hgb 16.4      Hct 47.4      MCV 87.6      MCH 30.3      MCHC 34.6      RDW 12.6      Platelet 242      MPV 10.0      Neut % 85.7      Lymph % 6.4      Mono % 7.4      Eos % 0.0      Basophil % 0.2      Imm Grans % 0.3      Neut # 17.37 (*)     Lymph # 1.30      Mono # 1.50 (*)     Eos # 0.00       Baso # 0.04      Imm Gran # 0.07 (*)     NRBC% 0.0     CK - Abnormal    Creatine Kinase 11,284 (*)    ISTAT PROCEDURE - Abnormal    POC PH 7.434      POC PCO2 33.0 (*)     POC PO2 130 (*)     POC HCO3 22.1 (*)     POC BE -2      POC SATURATED O2 99      POC TCO2 23      Sample ARTERIAL     ALCOHOL,MEDICAL (ETHANOL) - Normal    Ethanol Level <10.0     URINALYSIS, MICROSCOPIC - Normal    Bacteria, UA None Seen      RBC, UA 3-5      WBC, UA None Seen      Squamous Epithelial Cells, UA None Seen     LACTIC ACID, PLASMA - Normal    Lactic Acid Level 1.0     BLOOD CULTURE OLG   BLOOD CULTURE [facility]   CBC W/ AUTO DIFFERENTIAL    Narrative:     The following orders were created for panel order CBC auto differential.  Procedure                               Abnormality         Status                     ---------                               -----------         ------                     CBC with Differential[[id number]]       Abnormal            Final result                 Please view results for these tests on the individual orders.          Imaging Results    None          Medications   sodium chloride 0.9% bolus 1,770 mL 1,770 mL (1,000 mLs Intravenous New Bag 6/30/25 2009)   ziprasidone injection 20 mg (20 mg Intramuscular Given 6/30/25 1743)   cefTRIAXone injection 1 g (1 g Intravenous Given 6/30/25 2008)     Medical Decision Making  Patient signed out to me, Dr. Gagnon, at shift change pending lab results for psych clearance.  CPK 11,284 consistent with rhabdomyolysis with a creatinine of 1.54, bicarb 15, anion gap 22, glucose 130.  CBC shows a leukocytosis of 20.2 so will cover with Rocephin, no obvious source.  We will give 30 milliliter/kg fluid bolus and admit to medicine.  I have spoken with the patient and/or caregivers. I have explained the patient's condition, diagnoses and treatment plan based on the information available to me at this time. I have answered the patient's and/or caregiver's questions and  addressed any concerns. The patient and/or caregivers have as good an understanding of the patient's diagnosis, condition and treatment plan as can be expected at this point. The patient has been stabilized within the capability of the emergency department. The patient will be transported for further care and management or will be moved to an observation or inpatient service. I have communicated with the staff or medical practitioner taking over this patient's care.    Amount and/or Complexity of Data Reviewed  Labs: ordered. Decision-making details documented in ED Course.    Risk  Prescription drug management.               ED Course as of 06/30/25 2019 Mon Jun 30, 2025 1934 WBC(!): 20.28 [IB]   1935 Hemoglobin: 16.4 [IB]   1935 Platelet Count: 242 [IB]   1935 Creatinine(!): 1.54 [IB]   1935 Cannabinoids, Urine(!): Positive [IB]      ED Course User Index  [IB] Jonny Gagnon DO       Medically cleared for psychiatry placement: 6/30/2025  7:10 PM                   Clinical Impression:  Final diagnoses:  [M62.82] Non-traumatic rhabdomyolysis (Primary)          ED Disposition Condition    Observation                       [1]   Social History  Tobacco Use    Smoking status: Former     Types: Vaping with nicotine     Start date: 2008   Vaping Use    Vaping status: Every Day    Substances: Nicotine    Devices: Pre-filled pod   Substance Use Topics    Alcohol use: Not Currently    Drug use: Yes     Types: Marijuana        Jonny Gagnon DO  06/30/25 2019

## 2025-07-02 LAB
ALBUMIN SERPL-MCNC: 3.4 G/DL (ref 3.5–5)
ALBUMIN/GLOB SERPL: 1.3 RATIO (ref 1.1–2)
ALP SERPL-CCNC: 52 UNIT/L (ref 40–150)
ALT SERPL-CCNC: 60 UNIT/L (ref 0–55)
ANION GAP SERPL CALC-SCNC: 7 MEQ/L
AST SERPL-CCNC: 170 UNIT/L (ref 11–45)
BASOPHILS # BLD AUTO: 0.03 X10(3)/MCL
BASOPHILS NFR BLD AUTO: 0.4 %
BILIRUB SERPL-MCNC: 0.8 MG/DL
BUN SERPL-MCNC: 6.7 MG/DL (ref 8.9–20.6)
CALCIUM SERPL-MCNC: 8.4 MG/DL (ref 8.4–10.2)
CHLORIDE SERPL-SCNC: 108 MMOL/L (ref 98–107)
CK SERPL-CCNC: ABNORMAL U/L (ref 30–200)
CO2 SERPL-SCNC: 27 MMOL/L (ref 22–29)
CREAT SERPL-MCNC: 0.86 MG/DL (ref 0.72–1.25)
CREAT/UREA NIT SERPL: 8
EOSINOPHIL # BLD AUTO: 0.07 X10(3)/MCL (ref 0–0.9)
EOSINOPHIL NFR BLD AUTO: 0.9 %
ERYTHROCYTE [DISTWIDTH] IN BLOOD BY AUTOMATED COUNT: 13 % (ref 11.5–17)
GFR SERPLBLD CREATININE-BSD FMLA CKD-EPI: >60 ML/MIN/1.73/M2
GLOBULIN SER-MCNC: 2.7 GM/DL (ref 2.4–3.5)
GLUCOSE SERPL-MCNC: 97 MG/DL (ref 74–100)
HCT VFR BLD AUTO: 40.2 % (ref 42–52)
HGB BLD-MCNC: 13.4 G/DL (ref 14–18)
IMM GRANULOCYTES # BLD AUTO: 0.03 X10(3)/MCL (ref 0–0.04)
IMM GRANULOCYTES NFR BLD AUTO: 0.4 %
LYMPHOCYTES # BLD AUTO: 2.02 X10(3)/MCL (ref 0.6–4.6)
LYMPHOCYTES NFR BLD AUTO: 25.3 %
MCH RBC QN AUTO: 30.4 PG (ref 27–31)
MCHC RBC AUTO-ENTMCNC: 33.3 G/DL (ref 33–36)
MCV RBC AUTO: 91.2 FL (ref 80–94)
MONOCYTES # BLD AUTO: 0.58 X10(3)/MCL (ref 0.1–1.3)
MONOCYTES NFR BLD AUTO: 7.3 %
NEUTROPHILS # BLD AUTO: 5.27 X10(3)/MCL (ref 2.1–9.2)
NEUTROPHILS NFR BLD AUTO: 65.7 %
NRBC BLD AUTO-RTO: 0 %
PLATELET # BLD AUTO: 164 X10(3)/MCL (ref 130–400)
PMV BLD AUTO: 10.4 FL (ref 7.4–10.4)
POTASSIUM SERPL-SCNC: 4.2 MMOL/L (ref 3.5–5.1)
PROT SERPL-MCNC: 6.1 GM/DL (ref 6.4–8.3)
RBC # BLD AUTO: 4.41 X10(6)/MCL (ref 4.7–6.1)
SODIUM SERPL-SCNC: 142 MMOL/L (ref 136–145)
WBC # BLD AUTO: 8 X10(3)/MCL (ref 4.5–11.5)

## 2025-07-02 PROCEDURE — 85025 COMPLETE CBC W/AUTO DIFF WBC: CPT

## 2025-07-02 PROCEDURE — 80053 COMPREHEN METABOLIC PANEL: CPT

## 2025-07-02 PROCEDURE — 63600175 PHARM REV CODE 636 W HCPCS

## 2025-07-02 PROCEDURE — 11000001 HC ACUTE MED/SURG PRIVATE ROOM

## 2025-07-02 PROCEDURE — 25000003 PHARM REV CODE 250

## 2025-07-02 PROCEDURE — 82550 ASSAY OF CK (CPK): CPT

## 2025-07-02 PROCEDURE — 86225 DNA ANTIBODY NATIVE: CPT

## 2025-07-02 PROCEDURE — 86039 ANTINUCLEAR ANTIBODIES (ANA): CPT

## 2025-07-02 PROCEDURE — 36415 COLL VENOUS BLD VENIPUNCTURE: CPT

## 2025-07-02 RX ORDER — ENOXAPARIN SODIUM 100 MG/ML
40 INJECTION SUBCUTANEOUS EVERY 24 HOURS
Status: DISCONTINUED | OUTPATIENT
Start: 2025-07-02 | End: 2025-07-08 | Stop reason: HOSPADM

## 2025-07-02 RX ORDER — SODIUM CHLORIDE 9 MG/ML
INJECTION, SOLUTION INTRAVENOUS CONTINUOUS
Status: ACTIVE | OUTPATIENT
Start: 2025-07-02 | End: 2025-07-03

## 2025-07-02 RX ADMIN — ENOXAPARIN SODIUM 40 MG: 40 INJECTION SUBCUTANEOUS at 05:07

## 2025-07-02 RX ADMIN — SODIUM CHLORIDE: 9 INJECTION, SOLUTION INTRAVENOUS at 05:07

## 2025-07-02 RX ADMIN — TRAZODONE HYDROCHLORIDE 50 MG: 50 TABLET ORAL at 08:07

## 2025-07-02 RX ADMIN — OLANZAPINE 10 MG: 5 TABLET, FILM COATED ORAL at 08:07

## 2025-07-02 NOTE — PROGRESS NOTES
"7/2/2025  Jurgen Berg   1995   [id number]        Psychiatry Progress Note     SUBJECTIVE:   Jurgen Berg is a 29 y.o. male with a history of bipolar disorder who presented to [facility] ED yesterday (06/30/25) after family called EMS for bizarre behavior and non-adherence with medications. On arrival he was documented to be agitated, hallucinating, yelling, and shaking. WBC 20.28 and improving. Cr 1.54 on admission. AG 22 with bicarb 15. Lactate normal. CPK 11,284. . Utox positive for marijuana. UA without signs of infection. He reports that he was last inpatient in August and has not taken his medications for several months. States he feels "manic" and currently displays a euthymic affect. Displays grandiose and Pentec[employer]al delusions. Displays some confusion and reports recently impaired sleep but unable to report how long he has been suffering with poor sleep. He denies SI, HI, or auditory/visual hallucinations.     Initially asleep but easily awoken. States his mood as "I feel everything" and also states "heaven is around me". Later during the interview displays an irritable affect and states "why are you treating me as an experiment". Thought content continues to be dominated by Pentecostal thoughts. Denies SI/HI or auditory/visual hallucinations. Reports that he slept well last night. No behavioral issues reported from sitter.        Current Medications:   Scheduled Meds:    OLANZapine  10 mg Oral Daily    traZODone  50 mg Oral QHS      PRN Meds:   Current Facility-Administered Medications:     acetaminophen, 650 mg, Oral, Q4H PRN    aluminum-magnesium hydroxide-simethicone, 30 mL, Oral, QID PRN    bisacodyL, 10 mg, Rectal, Daily PRN    dextrose 50%, 12.5 g, Intravenous, PRN    dextrose 50%, 25 g, Intravenous, PRN    glucagon (human recombinant), 1 mg, Intramuscular, PRN    glucose, 16 g, Oral, PRN    glucose, 24 g, Oral, PRN    melatonin, 6 mg, Oral, Nightly PRN    polyethylene glycol, 17 g, Oral, BID PRN    " sodium chloride 0.9%, 10 mL, Intravenous, PRN    sodium chloride 0.9%, 10 mL, Intravenous, PRN    ziprasidone, 10 mg, Intramuscular, Q12H PRN   Psychotherapeutics (From admission, onward)      Start     Stop Route Frequency Ordered    07/01/25 2100  traZODone tablet 50 mg         -- Oral Nightly 07/01/25 1050    07/01/25 1633  ziprasidone injection 10 mg         -- IM Every 12 hours PRN 07/01/25 1534    07/01/25 1520  LORazepam (ATIVAN) 2 mg/mL injection        Note to Pharmacy: Created by cabinet override    07/02/25 0329   07/01/25 1520    07/01/25 1200  OLANZapine tablet 10 mg         -- Oral Daily 07/01/25 1050            Allergies:   Review of patient's allergies indicates:  No Known Allergies     OBJECTIVE:   Vitals   Vitals:    07/02/25 1109   BP: 129/85   Pulse: (!) 58   Resp: 18   Temp: 97.7 °F (36.5 °C)        Labs/Imaging/Studies:   Recent Results (from the past 36 hours)   Comprehensive Metabolic Panel (CMP)    Collection Time: 07/01/25  4:39 AM   Result Value Ref Range    Sodium 140 136 - 145 mmol/L    Potassium 3.7 3.5 - 5.1 mmol/L    Chloride 112 (H) 98 - 107 mmol/L    CO2 24 22 - 29 mmol/L    Glucose 132 (H) 74 - 100 mg/dL    Blood Urea Nitrogen 7.7 (L) 8.9 - 20.6 mg/dL    Creatinine 0.85 0.72 - 1.25 mg/dL    Calcium 7.8 (L) 8.4 - 10.2 mg/dL    Protein Total 5.9 (L) 6.4 - 8.3 gm/dL    Albumin 3.4 (L) 3.5 - 5.0 g/dL    Globulin 2.5 2.4 - 3.5 gm/dL    Albumin/Globulin Ratio 1.4 1.1 - 2.0 ratio    Bilirubin Total 1.3 <=1.5 mg/dL    ALP 46 40 - 150 unit/L    ALT 38 0 - 55 unit/L     (H) 11 - 45 unit/L    eGFR >60 mL/min/1.73/m2    Anion Gap 4.0 mEq/L    BUN/Creatinine Ratio 9    Magnesium    Collection Time: 07/01/25  4:39 AM   Result Value Ref Range    Magnesium Level 2.20 1.60 - 2.60 mg/dL   CBC with Differential    Collection Time: 07/01/25  4:39 AM   Result Value Ref Range    WBC 13.27 (H) 4.50 - 11.50 x10(3)/mcL    RBC 4.46 (L) 4.70 - 6.10 x10(6)/mcL    Hgb 13.5 (L) 14.0 - 18.0 g/dL    Hct  39.7 (L) 42.0 - 52.0 %    MCV 89.0 80.0 - 94.0 fL    MCH 30.3 27.0 - 31.0 pg    MCHC 34.0 33.0 - 36.0 g/dL    RDW 12.8 11.5 - 17.0 %    Platelet 174 130 - 400 x10(3)/mcL    MPV 10.1 7.4 - 10.4 fL    Neut % 76.6 %    Lymph % 15.7 %    Mono % 6.8 %    Eos % 0.2 %    Basophil % 0.3 %    Imm Grans % 0.4 %    Neut # 10.16 (H) 2.1 - 9.2 x10(3)/mcL    Lymph # 2.09 0.6 - 4.6 x10(3)/mcL    Mono # 0.90 0.1 - 1.3 x10(3)/mcL    Eos # 0.03 0 - 0.9 x10(3)/mcL    Baso # 0.04 <=0.2 x10(3)/mcL    Imm Gran # 0.05 (H) 0.00 - 0.04 x10(3)/mcL    NRBC% 0.0 %   Hepatitis Panel, Acute    Collection Time: 07/01/25 11:59 AM   Result Value Ref Range    Hep A IgM Interp Nonreactive Nonreactive    Hep B Core IgM Interp Nonreactive Nonreactive    Hep BsAg Interp Nonreactive Nonreactive    Hep C Ab Interp Nonreactive Nonreactive   TSH    Collection Time: 07/01/25  3:39 PM   Result Value Ref Range    TSH 1.007 0.350 - 4.940 uIU/mL   Comprehensive Metabolic Panel    Collection Time: 07/02/25  4:58 AM   Result Value Ref Range    Sodium 142 136 - 145 mmol/L    Potassium 4.2 3.5 - 5.1 mmol/L    Chloride 108 (H) 98 - 107 mmol/L    CO2 27 22 - 29 mmol/L    Glucose 97 74 - 100 mg/dL    Blood Urea Nitrogen 6.7 (L) 8.9 - 20.6 mg/dL    Creatinine 0.86 0.72 - 1.25 mg/dL    Calcium 8.4 8.4 - 10.2 mg/dL    Protein Total 6.1 (L) 6.4 - 8.3 gm/dL    Albumin 3.4 (L) 3.5 - 5.0 g/dL    Globulin 2.7 2.4 - 3.5 gm/dL    Albumin/Globulin Ratio 1.3 1.1 - 2.0 ratio    Bilirubin Total 0.8 <=1.5 mg/dL    ALP 52 40 - 150 unit/L    ALT 60 (H) 0 - 55 unit/L     (H) 11 - 45 unit/L    eGFR >60 mL/min/1.73/m2    Anion Gap 7.0 mEq/L    BUN/Creatinine Ratio 8    CK    Collection Time: 07/02/25  4:58 AM   Result Value Ref Range    Creatine Kinase 13,060 (H) 30 - 200 U/L   CBC with Differential    Collection Time: 07/02/25  4:58 AM   Result Value Ref Range    WBC 8.00 4.50 - 11.50 x10(3)/mcL    RBC 4.41 (L) 4.70 - 6.10 x10(6)/mcL    Hgb 13.4 (L) 14.0 - 18.0 g/dL    Hct 40.2  "(L) 42.0 - 52.0 %    MCV 91.2 80.0 - 94.0 fL    MCH 30.4 27.0 - 31.0 pg    MCHC 33.3 33.0 - 36.0 g/dL    RDW 13.0 11.5 - 17.0 %    Platelet 164 130 - 400 x10(3)/mcL    MPV 10.4 7.4 - 10.4 fL    Neut % 65.7 %    Lymph % 25.3 %    Mono % 7.3 %    Eos % 0.9 %    Basophil % 0.4 %    Imm Grans % 0.4 %    Neut # 5.27 2.1 - 9.2 x10(3)/mcL    Lymph # 2.02 0.6 - 4.6 x10(3)/mcL    Mono # 0.58 0.1 - 1.3 x10(3)/mcL    Eos # 0.07 0 - 0.9 x10(3)/mcL    Baso # 0.03 <=0.2 x10(3)/mcL    Imm Gran # 0.03 0.00 - 0.04 x10(3)/mcL    NRBC% 0.0 %          Psychiatric Mental Status Exam:  General Appearance: appears stated age, dressed in hospital garb, lying in bed, in no acute distress  Arousal: alert  Behavior: cooperative, appropriate eye-contact  Movements and Motor Activity: no tics, no tremors, no akathisia, no dystonia, no evidence of tardive dyskinesia  Orientation: oriented to person, place, and time  Speech: spontaneous, coherent  Mood: "I feel everything"  Affect: euthymic, irritable, labile  Thought Process: disorganized  Associations: no loosening of associations  Thought Content and Perceptions: no suicidal ideation, no homicidal ideation, + paranoid ideation, + delusions, no hallucinations  Recent and Remote Memory: mild impairments noted; per interview/observation with patient  Attention and Concentration: easily distractible; per interview/observation with patient  Fund of Knowledge: vocabulary appropriate; based on history, vocabulary, fund of knowledge, syntax, grammar, and content  Insight: limited; based on understanding of severity of illness and HPI  Judgment: limited; based on patient's behavior and HPI    ASSESSMENT/PLAN:   Problems Addressed/Diagnoses:  Bipolar I Disorder, Current episode manic, severe with psychotic features     Past Medical History:   Diagnosis Date    Bipolar disorder     History of psychiatric hospitalization     Hx of psychiatric care     Psychiatric problem         Plan:  Medication " Management  Olanzapine 10mg PO QD  Trazodone 50mg PO QHS  Ziprasidone 10mg IM Q12hr PRN agitation  Legal  Continue PEC  Disposition  Recommend inpatient psychiatric hospitalization  Psychiatry will continue to follow           Bello Booker

## 2025-07-02 NOTE — PLAN OF CARE
Problem: Fall Injury Risk  Goal: Absence of Fall and Fall-Related Injury  7/2/2025 0508 by Alma Bernard LPN  Outcome: Progressing  7/2/2025 0508 by Alma Bernard LPN  Outcome: Progressing     Problem: Adult Inpatient Plan of Care  Goal: Plan of Care Review  7/2/2025 0508 by Alma Bernard LPN  Outcome: Progressing  7/2/2025 0508 by Alma Bernard LPN  Outcome: Progressing  Goal: Patient-Specific Goal (Individualized)  7/2/2025 0508 by Alma Bernard LPN  Outcome: Progressing  7/2/2025 0508 by Alma Bernard LPN  Outcome: Progressing  Goal: Absence of Hospital-Acquired Illness or Injury  7/2/2025 0508 by Alma Bernard LPN  Outcome: Progressing  7/2/2025 0508 by Alma Bernard LPN  Outcome: Progressing  Goal: Optimal Comfort and Wellbeing  7/2/2025 0508 by Alma Bernard LPN  Outcome: Progressing  7/2/2025 0508 by Alma Bernard LPN  Outcome: Progressing  Goal: Readiness for Transition of Care  7/2/2025 0508 by Alma Bernard LPN  Outcome: Progressing  7/2/2025 0508 by Alma Bernard LPN  Outcome: Progressing     Problem: Fluid Volume Deficit  Goal: Fluid Balance  7/2/2025 0508 by Alma Bernard LPN  Outcome: Progressing  7/2/2025 0508 by Alma Bernard LPN  Outcome: Progressing     Problem: Disruptive Behavior  Goal: Improved Impulse and Aggression Control (Disruptive Behavior)  7/2/2025 0508 by Alma Bernard LPN  Outcome: Progressing  7/2/2025 0508 by Alma Bernard LPN  Outcome: Progressing  Goal: Improved Mood Symptoms (Disruptive Behavior)  7/2/2025 0508 by Alma Bernard LPN  Outcome: Progressing  7/2/2025 0508 by Alma Bernard LPN  Outcome: Progressing  Goal: Improved Sleep (Disruptive Behavior)  7/2/2025 0508 by Alma Bernard LPN  Outcome: Progressing  7/2/2025 0508 by Alma Bernard LPN  Outcome: Progressing  Goal: Enhanced Social, Occupational or Functional Skills (Disruptive Behavior)  7/2/2025 0508 by Marco Antonio  Alveianna, LPN  Outcome: Progressing  7/2/2025 0508 by Alma Bernard LPN  Outcome: Progressing     Problem: Anxiety Signs/Symptoms  Goal: Optimized Energy Level (Anxiety Signs/Symptoms)  7/2/2025 0508 by Alma Bernard LPN  Outcome: Progressing  7/2/2025 0508 by Alma Bernard LPN  Outcome: Progressing  Goal: Optimized Cognitive Function (Anxiety Signs/Symptoms)  7/2/2025 0508 by Alma Bernard LPN  Outcome: Progressing  7/2/2025 0508 by Alma Bernard LPN  Outcome: Progressing  Goal: Improved Mood Symptoms (Anxiety Signs/Symptoms)  7/2/2025 0508 by Alma Bernard LPN  Outcome: Progressing  7/2/2025 0508 by Alma Bernard LPN  Outcome: Progressing  Goal: Improved Sleep (Anxiety Signs/Symptoms)  7/2/2025 0508 by Alma Bernard LPN  Outcome: Progressing  7/2/2025 0508 by Alma Bernard LPN  Outcome: Progressing  Goal: Enhanced Social, Occupational or Functional Skills (Anxiety Signs/Symptoms)  7/2/2025 0508 by Alma Bernard LPN  Outcome: Progressing  7/2/2025 0508 by Alma Bernard LPN  Outcome: Progressing  Goal: Improved Somatic Symptoms (Anxiety Signs/Symptoms)  7/2/2025 0508 by Alma Bernard LPN  Outcome: Progressing  7/2/2025 0508 by Alma Bernard LPN  Outcome: Progressing

## 2025-07-02 NOTE — PROGRESS NOTES
"[facility] Hospital Medicine Progress Note        Chief Complaint: Inpatient Follow-up     HPI:   29 year old man with a PMHx of Bipolar disorder was brought to the hospital by family due to "bizarre behavior". Vital signs grossly normal on admission. Of note patient was agitated and had briefly required restraints. WBC 20.28 and improving. Cr 1.54 on admission. AG 22 with bicarb 15. Lactate normal. CPK 11,284. . Utox positive for marijuana. UA without signs of infection.      I saw the patient in room 515 this morning. He reports that he feels like God is speaking to him and is in him. He denies auditory, tactile or visual hallucinations. He reports feeling full of energy and gambling. Lives at home with his parents. He works in a restaurant. Denies suicidal or homicidal ideations. He denies substance use apart from marijuana. Also does vaping.    Unclear etiology of rhabdomyolysis.     Interval Hx:   Seen and examined this morning. CK continues to trend up. Unclear source of rhabdo for now. Possible trauma from hitting objects walls? In the presence of the nurse Lalitha Navarro I did a thorough physical exam to rule out things like compartment syndrome and no clinical signs.     Case was discussed with patient's nurse and  on the floor.    Objective/physical exam:  General: In no acute distress, afebrile  Chest: Clear to auscultation bilaterally  Heart: RRR, +S1, S2, no appreciable murmur  Abdomen: Soft, nontender, BS +  MSK: Warm, no lower extremity edema, no clubbing or cyanosis  Neurologic: Alert and oriented x4, Cranial nerve II-XII intact, Strength 5/5 in all 4 extremities    VITAL SIGNS: 24 HRS MIN & MAX LAST   Temp  Min: 97.5 °F (36.4 °C)  Max: 98.3 °F (36.8 °C) 97.7 °F (36.5 °C)   BP  Min: 110/71  Max: 133/78 129/85   Pulse  Min: 53  Max: 71  (!) 58   Resp  Min: 18  Max: 18 18   SpO2  Min: 97 %  Max: 99 % 99 %     I have reviewed the following labs:  Recent Labs "   Lab 06/30/25 1746 07/01/25 0439 07/02/25  0458   WBC 20.28* 13.27* 8.00   RBC 5.41 4.46* 4.41*   HGB 16.4 13.5* 13.4*   HCT 47.4 39.7* 40.2*   MCV 87.6 89.0 91.2   MCH 30.3 30.3 30.4   MCHC 34.6 34.0 33.3   RDW 12.6 12.8 13.0    174 164   MPV 10.0 10.1 10.4     Recent Labs   Lab 06/30/25 1746 06/30/25 2004 07/01/25 0439 07/02/25  0458     --  140 142   K 3.7  --  3.7 4.2     --  112* 108*   CO2 15*  --  24 27   BUN 9.6  --  7.7* 6.7*   CREATININE 1.54*  --  0.85 0.86   *  --  132* 97   CALCIUM 9.6  --  7.8* 8.4   PH  --  7.434  --   --    MG  --   --  2.20  --    ALBUMIN 4.5  --  3.4* 3.4*   PROT 8.2  --  5.9* 6.1*   ALKPHOS 56  --  46 52   ALT 43  --  38 60*   *  --  140* 170*   BILITOT 2.1*  --  1.3 0.8     Microbiology Results (last 7 days)       Procedure Component Value Units Date/Time    Blood culture #1 **CANNOT BE ORDERED STAT** [[id number]]  (Normal) Collected: 06/30/25 1946    Order Status: Completed Specimen: Blood from Antecubital, Right Updated: 07/02/25 0005     Blood Culture No Growth At 24 Hours    Blood culture #2 **CANNOT BE ORDERED STAT** [[id number]]  (Normal) Collected: 06/30/25 1951    Order Status: Completed Specimen: Blood from Wrist, Left Updated: 07/02/25 0005     Blood Culture No Growth At 24 Hours             See below for Radiology    Assessment/Plan:  # Bipolar disorder with psychotic/manic features  # Leukocytosis  - resolved  # ZELDA - resolved  # AGMA - resolved  # Rhabdomyolysis   # Elevated LFT 2/2 rhabdo     - increase IV fluids to 150 cc/hr  - follow up Bcx  - psych consult  - olanzapine, trazodone and geodan as per psych  - hepatitis panel negative  - RUQ US without acute abnormality  - CBC and CMP in AM  - monitor CK - unclear source of rhado. Possible trauma from when he hits himself against stuff. No IV drug use. No signs of compartment syndrome. No statin use. Will check for autoimmune stuff. Obtain MARILIN, DsDNA for now and then other labs  as need be  - TSH normal  - PEC as per psych  - discharge to psych inpatient once medically cleared likely in the next 24-48 hours    VTE prophylaxis: lovenox    Patient condition:  Fair    Anticipated discharge and Disposition:         All diagnosis and differential diagnosis have been reviewed; assessment and plan has been documented; I have personally reviewed the labs and test results that are presently available; I have reviewed the patients medication list; I have reviewed the consulting providers response and recommendations. I have reviewed or attempted to review medical records based upon their availability    All of the patient's questions have been  addressed and answered. Patient's is agreeable to the above stated plan. I will continue to monitor closely and make adjustments to medical management as needed.      _____________________________________________________________________    Malnutrition Status:  Nutrition consulted. Most recent weight and BMI monitored-     Measurements:  Wt Readings from Last 1 Encounters:   07/01/25 61.8 kg (136 lb 3.9 oz)   Body mass index is 21.99 kg/m².    Patient has been screened and assessed by RD.    Malnutrition Type:  Context:    Level:      Malnutrition Characteristic Summary:       Interventions/Recommendations (treatment strategy):        Scheduled Med:   OLANZapine  10 mg Oral Daily    traZODone  50 mg Oral QHS      Continuous Infusions:   0.9% NaCl   Intravenous Continuous          PRN Meds:    Current Facility-Administered Medications:     acetaminophen, 650 mg, Oral, Q4H PRN    aluminum-magnesium hydroxide-simethicone, 30 mL, Oral, QID PRN    bisacodyL, 10 mg, Rectal, Daily PRN    dextrose 50%, 12.5 g, Intravenous, PRN    dextrose 50%, 25 g, Intravenous, PRN    glucagon (human recombinant), 1 mg, Intramuscular, PRN    glucose, 16 g, Oral, PRN    glucose, 24 g, Oral, PRN    melatonin, 6 mg, Oral, Nightly PRN    polyethylene glycol, 17 g, Oral, BID PRN    sodium  chloride 0.9%, 10 mL, Intravenous, PRN    sodium chloride 0.9%, 10 mL, Intravenous, PRN    ziprasidone, 10 mg, Intramuscular, Q12H PRN     Radiology:  I have personally reviewed the following imaging and agree with the radiologist.     US Abdomen Limited  Narrative: EXAMINATION:  US ABDOMEN LIMITED    CLINICAL HISTORY:  RUQ US - elevated LFT;    COMPARISON:  No previous studies are available for comparison.    FINDINGS:  Grayscale, color and spectral doppler evaluation of the right upper quadrant.    No focal abnormality of limited visualized pancreas. Imaged portion of the IVC is normal in caliber.    Liver is not significantly enlarged. No focal liver lesion. Normal hepatopetal flow is noted in the portal vein.    No gallstones. No significant gallbladder wall thickening or pericholecystic fluid.  The common bile duct is normal in caliber  and measures 3 mm.    Right kidney measures 9 cm in length. No hydronephrosis.  Impression: No significant sonographic abnormality of the right upper quadrant.    Electronically signed by: Tha Lloyd  Date:    07/01/2025  Time:    14:24      Julio C Burroughs MD  Department of Hospital Medicine   [facility]   07/02/2025

## 2025-07-03 LAB
ALBUMIN SERPL-MCNC: 3.7 G/DL (ref 3.5–5)
ALBUMIN/GLOB SERPL: 1.1 RATIO (ref 1.1–2)
ALP SERPL-CCNC: 53 UNIT/L (ref 40–150)
ALT SERPL-CCNC: 73 UNIT/L (ref 0–55)
ANA SER QL HEP2 SUBST: NORMAL
ANION GAP SERPL CALC-SCNC: 5 MEQ/L
AST SERPL-CCNC: 153 UNIT/L (ref 11–45)
BASOPHILS # BLD AUTO: 0.02 X10(3)/MCL
BASOPHILS NFR BLD AUTO: 0.3 %
BILIRUB SERPL-MCNC: 1 MG/DL
BUN SERPL-MCNC: 7.8 MG/DL (ref 8.9–20.6)
CALCIUM SERPL-MCNC: 8.8 MG/DL (ref 8.4–10.2)
CHLORIDE SERPL-SCNC: 107 MMOL/L (ref 98–107)
CK SERPL-CCNC: 9348 U/L (ref 30–200)
CO2 SERPL-SCNC: 30 MMOL/L (ref 22–29)
CREAT SERPL-MCNC: 0.85 MG/DL (ref 0.72–1.25)
CREAT/UREA NIT SERPL: 9
DSDNA AB QUANT (OHS): 1 IU/ML
EOSINOPHIL # BLD AUTO: 0.08 X10(3)/MCL (ref 0–0.9)
EOSINOPHIL NFR BLD AUTO: 1.2 %
ERYTHROCYTE [DISTWIDTH] IN BLOOD BY AUTOMATED COUNT: 12.6 % (ref 11.5–17)
GFR SERPLBLD CREATININE-BSD FMLA CKD-EPI: >60 ML/MIN/1.73/M2
GLOBULIN SER-MCNC: 3.3 GM/DL (ref 2.4–3.5)
GLUCOSE SERPL-MCNC: 92 MG/DL (ref 74–100)
HCT VFR BLD AUTO: 44.5 % (ref 42–52)
HGB BLD-MCNC: 15.1 G/DL (ref 14–18)
IMM GRANULOCYTES # BLD AUTO: 0.03 X10(3)/MCL (ref 0–0.04)
IMM GRANULOCYTES NFR BLD AUTO: 0.5 %
LYMPHOCYTES # BLD AUTO: 1.87 X10(3)/MCL (ref 0.6–4.6)
LYMPHOCYTES NFR BLD AUTO: 28.6 %
MCH RBC QN AUTO: 30.2 PG (ref 27–31)
MCHC RBC AUTO-ENTMCNC: 33.9 G/DL (ref 33–36)
MCV RBC AUTO: 89 FL (ref 80–94)
MONOCYTES # BLD AUTO: 0.36 X10(3)/MCL (ref 0.1–1.3)
MONOCYTES NFR BLD AUTO: 5.5 %
NEUTROPHILS # BLD AUTO: 4.17 X10(3)/MCL (ref 2.1–9.2)
NEUTROPHILS NFR BLD AUTO: 63.9 %
NRBC BLD AUTO-RTO: 0 %
PATH REV: NORMAL
PLATELET # BLD AUTO: 200 X10(3)/MCL (ref 130–400)
PMV BLD AUTO: 10.7 FL (ref 7.4–10.4)
POTASSIUM SERPL-SCNC: 3.9 MMOL/L (ref 3.5–5.1)
PROT SERPL-MCNC: 7 GM/DL (ref 6.4–8.3)
RBC # BLD AUTO: 5 X10(6)/MCL (ref 4.7–6.1)
SODIUM SERPL-SCNC: 142 MMOL/L (ref 136–145)
WBC # BLD AUTO: 6.53 X10(3)/MCL (ref 4.5–11.5)

## 2025-07-03 PROCEDURE — 25000003 PHARM REV CODE 250

## 2025-07-03 PROCEDURE — 36415 COLL VENOUS BLD VENIPUNCTURE: CPT

## 2025-07-03 PROCEDURE — 63600175 PHARM REV CODE 636 W HCPCS

## 2025-07-03 PROCEDURE — 82550 ASSAY OF CK (CPK): CPT

## 2025-07-03 PROCEDURE — 11000001 HC ACUTE MED/SURG PRIVATE ROOM

## 2025-07-03 PROCEDURE — 85025 COMPLETE CBC W/AUTO DIFF WBC: CPT

## 2025-07-03 PROCEDURE — 80053 COMPREHEN METABOLIC PANEL: CPT

## 2025-07-03 RX ORDER — OLANZAPINE 5 MG/1
15 TABLET, FILM COATED ORAL DAILY
Status: DISCONTINUED | OUTPATIENT
Start: 2025-07-04 | End: 2025-07-08 | Stop reason: HOSPADM

## 2025-07-03 RX ADMIN — ENOXAPARIN SODIUM 40 MG: 40 INJECTION SUBCUTANEOUS at 04:07

## 2025-07-03 RX ADMIN — SODIUM CHLORIDE: 9 INJECTION, SOLUTION INTRAVENOUS at 03:07

## 2025-07-03 RX ADMIN — TRAZODONE HYDROCHLORIDE 50 MG: 50 TABLET ORAL at 08:07

## 2025-07-03 RX ADMIN — OLANZAPINE 10 MG: 5 TABLET, FILM COATED ORAL at 09:07

## 2025-07-03 RX ADMIN — SODIUM CHLORIDE: 9 INJECTION, SOLUTION INTRAVENOUS at 04:07

## 2025-07-03 RX ADMIN — SODIUM CHLORIDE: 9 INJECTION, SOLUTION INTRAVENOUS at 09:07

## 2025-07-03 NOTE — PROGRESS NOTES
Inpatient Nutrition Assessment    Admit Date: 6/30/2025   Total duration of encounter: 3 days   Patient Age: 29 y.o.    Nutrition Recommendation/Prescription     Continue oral diet as tolerated; Diet Adult Regular Safety Tray     Communication of Recommendations: reviewed with nurse    Nutrition Assessment     Malnutrition Assessment/Nutrition-Focused Physical Exam       Malnutrition Level: other (see comments) (Does not meet criteria) (07/03/25 1608)     Weight Loss (Malnutrition): other (see comments) (Does not meet criteria) (07/03/25 1608)                                                  A minimum of two characteristics is recommended for diagnosis of either severe or non-severe malnutrition.    Chart Review    Reason Seen: malnutrition screening tool (MST)    Malnutrition Screening Tool Results   Have you recently lost weight without trying?: Unsure  Have you been eating poorly because of a decreased appetite?: No   MST Score: 2   Diagnosis:   Bipolar disorder with psychotic/manic features   Rhabdomyolysis   Elevated LFT 2/2 rhabdo     Relevant Medical History: Bipolar disorder     Scheduled Medications:  enoxparin, 40 mg, Q24H (prophylaxis, 1700)  [START ON 7/4/2025] OLANZapine, 15 mg, Daily  traZODone, 50 mg, QHS    Continuous Infusions:  0.9% NaCl, Last Rate: 150 mL/hr at 07/03/25 0915    PRN Medications:  acetaminophen, 650 mg, Q4H PRN  aluminum-magnesium hydroxide-simethicone, 30 mL, QID PRN  bisacodyL, 10 mg, Daily PRN  dextrose 50%, 12.5 g, PRN  dextrose 50%, 25 g, PRN  glucagon (human recombinant), 1 mg, PRN  glucose, 16 g, PRN  glucose, 24 g, PRN  melatonin, 6 mg, Nightly PRN  polyethylene glycol, 17 g, BID PRN  sodium chloride 0.9%, 10 mL, PRN  sodium chloride 0.9%, 10 mL, PRN  ziprasidone, 10 mg, Q12H PRN    Calorie Containing IV Medications: no significant kcals from medications at this time    Recent Labs   Lab 06/30/25  1746 07/01/25  0439 07/02/25  0458 07/03/25  0514    140 142 142   K 3.7  "3.7 4.2 3.9   CALCIUM 9.6 7.8* 8.4 8.8   MG  --  2.20  --   --     112* 108* 107   CO2 15* 24 27 30*   BUN 9.6 7.7* 6.7* 7.8*   CREATININE 1.54* 0.85 0.86 0.85   EGFRNORACEVR >60 >60 >60 >60   * 132* 97 92   BILITOT 2.1* 1.3 0.8 1.0   ALKPHOS 56 46 52 53   ALT 43 38 60* 73*   * 140* 170* 153*   ALBUMIN 4.5 3.4* 3.4* 3.7   WBC 20.28* 13.27* 8.00 6.53   HGB 16.4 13.5* 13.4* 15.1   HCT 47.4 39.7* 40.2* 44.5     Nutrition Orders:  Diet Adult Regular Safety Tray      Appetite/Oral Intake: good/% of meals  Factors Affecting Nutritional Intake: none identified  Social Needs Impacting Access to Food: unable to assess at this time; will attempt on follow-up  Food/Worship/Cultural Preferences: unable to obtain  Food Allergies: no known food allergies  Last Bowel Movement: 07/01/25  Wound(s):      Comments    7/3/25 Pt not available for interview; nursing reports good intake of meals, ate 75% of lunch today; no significant weight loss noted in EMR.    Anthropometrics    Height: 5' 5.98" (167.6 cm), Height Method: Stated  Last Weight: 61.8 kg (136 lb 3.9 oz) (07/01/25 0133), Weight Method: Bed Scale  BMI (Calculated): 22  BMI Classification: normal (BMI 18.5-24.9)        Ideal Body Weight (IBW), Male: 141.88 lb     % Ideal Body Weight, Male (lb): 95.95 %                          Usual Weight Provided By: EMR weight history    Wt Readings from Last 5 Encounters:   07/01/25 61.8 kg (136 lb 3.9 oz)   08/24/24 63.5 kg (140 lb)   08/24/24 63.5 kg (140 lb)   06/14/24 58 kg (127 lb 13.9 oz)   06/11/24 54.4 kg (120 lb)     Weight Change(s) Since Admission:   Wt Readings from Last 1 Encounters:   07/01/25 0133 61.8 kg (136 lb 3.9 oz)   06/30/25 1725 59 kg (130 lb)   Admit Weight: 59 kg (130 lb) (06/30/25 1725), Weight Method: Estimated    Estimated Needs    Weight Used For Calorie Calculations: 61.8 kg (136 lb 3.9 oz)  Energy Calorie Requirements (kcal): 1749-4939 kcal (30-35 kcal/kg)  Energy Need Method: " Kcal/kg  Weight Used For Protein Calculations: 61.8 kg (136 lb 3.9 oz)  Protein Requirements: 92gm ( 1.5 gm/kg)  Fluid Requirements (mL): 2163 ml ( 35 ml/kg)        Enteral Nutrition     Patient not receiving enteral nutrition at this time.    Parenteral Nutrition     Patient not receiving parenteral nutrition support at this time.    Evaluation of Received Nutrient Intake    Calories: meeting estimated needs  Protein: meeting estimated needs    Patient Education     Not applicable.    Nutrition Diagnosis     PES: No nutrition diagnosis at this time   PES:            Nutrition Interventions     Intervention(s): general/healthful diet  Intervention(s):      Goal: Consume % of meals/snacks by follow-up. (new)      Nutrition Goals & Monitoring     Dietitian will monitor: food and beverage intake  Discharge planning: resume home regimen  Nutrition Risk/Follow-Up: dietitian will follow-up one time per week   Please consult if re-assessment needed sooner.

## 2025-07-03 NOTE — PROGRESS NOTES
"7/3/2025  Jurgen Berg   1995   [id number]        Psychiatry Progress Note       SUBJECTIVE:   Jurgen Berg is a 29 y.o. male with a history of bipolar disorder who presented to [facility] ED yesterday (06/30/25) after family called EMS for bizarre behavior and non-adherence with medications. On arrival he was documented to be agitated, hallucinating, yelling, and shaking. WBC 20.28 and improving. Cr 1.54 on admission. AG 22 with bicarb 15. Lactate normal. CPK 11,284. . Utox positive for marijuana. UA without signs of infection. He reports that he was last inpatient in August and has not taken his medications for several months. States he feels "manic" and currently displays a euthymic affect. Displays grandiose and Druze delusions. Displays some confusion and reports recently impaired sleep but unable to report how long he has been suffering with poor sleep. He denies SI, HI, or auditory/visual hallucinations.     Reports feeling that he is in more control of his thoughts and behavior today but does continue to endorse racing thoughts. Thought content remains dominated by Druze thoughts. When asked if has hallucinations he reports "it feels like their are edin wings on my back and I can visualize edin wings on you since your helping me". Denies that these are actual tactile or visual hallucinations. Speech is an appropriate rate but he remains talkative. Reports no issues with sleep last night. Denies SI/HI.        Current Medications:   Scheduled Meds:    enoxparin  40 mg Subcutaneous Q24H (prophylaxis, 1700)    OLANZapine  10 mg Oral Daily    traZODone  50 mg Oral QHS      PRN Meds:   Current Facility-Administered Medications:     acetaminophen, 650 mg, Oral, Q4H PRN    aluminum-magnesium hydroxide-simethicone, 30 mL, Oral, QID PRN    bisacodyL, 10 mg, Rectal, Daily PRN    dextrose 50%, 12.5 g, Intravenous, PRN    dextrose 50%, 25 g, Intravenous, PRN    glucagon (human recombinant), 1 mg, " Intramuscular, PRN    glucose, 16 g, Oral, PRN    glucose, 24 g, Oral, PRN    melatonin, 6 mg, Oral, Nightly PRN    polyethylene glycol, 17 g, Oral, BID PRN    sodium chloride 0.9%, 10 mL, Intravenous, PRN    sodium chloride 0.9%, 10 mL, Intravenous, PRN    ziprasidone, 10 mg, Intramuscular, Q12H PRN   Psychotherapeutics (From admission, onward)      Start     Stop Route Frequency Ordered    07/01/25 2100  traZODone tablet 50 mg         -- Oral Nightly 07/01/25 1050    07/01/25 1633  ziprasidone injection 10 mg         -- IM Every 12 hours PRN 07/01/25 1534    07/01/25 1520  LORazepam (ATIVAN) 2 mg/mL injection        Note to Pharmacy: Created by cabinet override    07/02/25 0329   07/01/25 1520    07/01/25 1200  OLANZapine tablet 10 mg         -- Oral Daily 07/01/25 1050            Allergies:   Review of patient's allergies indicates:  No Known Allergies     OBJECTIVE:   Vitals   Vitals:    07/03/25 1455   BP: 129/83   Pulse: (!) 58   Resp:    Temp: 98.1 °F (36.7 °C)        Labs/Imaging/Studies:   Recent Results (from the past 36 hours)   Comprehensive Metabolic Panel    Collection Time: 07/02/25  4:58 AM   Result Value Ref Range    Sodium 142 136 - 145 mmol/L    Potassium 4.2 3.5 - 5.1 mmol/L    Chloride 108 (H) 98 - 107 mmol/L    CO2 27 22 - 29 mmol/L    Glucose 97 74 - 100 mg/dL    Blood Urea Nitrogen 6.7 (L) 8.9 - 20.6 mg/dL    Creatinine 0.86 0.72 - 1.25 mg/dL    Calcium 8.4 8.4 - 10.2 mg/dL    Protein Total 6.1 (L) 6.4 - 8.3 gm/dL    Albumin 3.4 (L) 3.5 - 5.0 g/dL    Globulin 2.7 2.4 - 3.5 gm/dL    Albumin/Globulin Ratio 1.3 1.1 - 2.0 ratio    Bilirubin Total 0.8 <=1.5 mg/dL    ALP 52 40 - 150 unit/L    ALT 60 (H) 0 - 55 unit/L     (H) 11 - 45 unit/L    eGFR >60 mL/min/1.73/m2    Anion Gap 7.0 mEq/L    BUN/Creatinine Ratio 8    CK    Collection Time: 07/02/25  4:58 AM   Result Value Ref Range    Creatine Kinase 13,060 (H) 30 - 200 U/L   CBC with Differential    Collection Time: 07/02/25  4:58 AM    Result Value Ref Range    WBC 8.00 4.50 - 11.50 x10(3)/mcL    RBC 4.41 (L) 4.70 - 6.10 x10(6)/mcL    Hgb 13.4 (L) 14.0 - 18.0 g/dL    Hct 40.2 (L) 42.0 - 52.0 %    MCV 91.2 80.0 - 94.0 fL    MCH 30.4 27.0 - 31.0 pg    MCHC 33.3 33.0 - 36.0 g/dL    RDW 13.0 11.5 - 17.0 %    Platelet 164 130 - 400 x10(3)/mcL    MPV 10.4 7.4 - 10.4 fL    Neut % 65.7 %    Lymph % 25.3 %    Mono % 7.3 %    Eos % 0.9 %    Basophil % 0.4 %    Imm Grans % 0.4 %    Neut # 5.27 2.1 - 9.2 x10(3)/mcL    Lymph # 2.02 0.6 - 4.6 x10(3)/mcL    Mono # 0.58 0.1 - 1.3 x10(3)/mcL    Eos # 0.07 0 - 0.9 x10(3)/mcL    Baso # 0.03 <=0.2 x10(3)/mcL    Imm Gran # 0.03 0.00 - 0.04 x10(3)/mcL    NRBC% 0.0 %   DSDNA    Collection Time: 07/02/25  1:01 PM   Result Value Ref Range    DSDNA Ab Quant 1.0 <10.0 IU/mL   Comprehensive Metabolic Panel    Collection Time: 07/03/25  5:14 AM   Result Value Ref Range    Sodium 142 136 - 145 mmol/L    Potassium 3.9 3.5 - 5.1 mmol/L    Chloride 107 98 - 107 mmol/L    CO2 30 (H) 22 - 29 mmol/L    Glucose 92 74 - 100 mg/dL    Blood Urea Nitrogen 7.8 (L) 8.9 - 20.6 mg/dL    Creatinine 0.85 0.72 - 1.25 mg/dL    Calcium 8.8 8.4 - 10.2 mg/dL    Protein Total 7.0 6.4 - 8.3 gm/dL    Albumin 3.7 3.5 - 5.0 g/dL    Globulin 3.3 2.4 - 3.5 gm/dL    Albumin/Globulin Ratio 1.1 1.1 - 2.0 ratio    Bilirubin Total 1.0 <=1.5 mg/dL    ALP 53 40 - 150 unit/L    ALT 73 (H) 0 - 55 unit/L     (H) 11 - 45 unit/L    eGFR >60 mL/min/1.73/m2    Anion Gap 5.0 mEq/L    BUN/Creatinine Ratio 9    CK    Collection Time: 07/03/25  5:14 AM   Result Value Ref Range    Creatine Kinase 9,348 (H) 30 - 200 U/L   CBC with Differential    Collection Time: 07/03/25  5:14 AM   Result Value Ref Range    WBC 6.53 4.50 - 11.50 x10(3)/mcL    RBC 5.00 4.70 - 6.10 x10(6)/mcL    Hgb 15.1 14.0 - 18.0 g/dL    Hct 44.5 42.0 - 52.0 %    MCV 89.0 80.0 - 94.0 fL    MCH 30.2 27.0 - 31.0 pg    MCHC 33.9 33.0 - 36.0 g/dL    RDW 12.6 11.5 - 17.0 %    Platelet 200 130 - 400  x10(3)/mcL    MPV 10.7 (H) 7.4 - 10.4 fL    Neut % 63.9 %    Lymph % 28.6 %    Mono % 5.5 %    Eos % 1.2 %    Basophil % 0.3 %    Imm Grans % 0.5 %    Neut # 4.17 2.1 - 9.2 x10(3)/mcL    Lymph # 1.87 0.6 - 4.6 x10(3)/mcL    Mono # 0.36 0.1 - 1.3 x10(3)/mcL    Eos # 0.08 0 - 0.9 x10(3)/mcL    Baso # 0.02 <=0.2 x10(3)/mcL    Imm Gran # 0.03 0.00 - 0.04 x10(3)/mcL    NRBC% 0.0 %        Psychiatric Mental Status Exam:  General Appearance: appears stated age, dressed in hospital garb, lying in bed, in no acute distress  Arousal: alert  Behavior: cooperative, appropriate eye-contact  Movements and Motor Activity: no tics, no tremors, no akathisia, no dystonia, no evidence of tardive dyskinesia  Orientation: oriented to person, place, and time  Speech: spontaneous, coherent  Mood: euthymic  Affect: euthymic, mood-congruent  Thought Process: disorganized  Associations: no loosening of associations  Thought Content and Perceptions: no suicidal ideation, no homicidal ideation, no paranoid ideation, no delusions, no hallucinations  Recent and Remote Memory: mild impairments noted; per interview/observation with patient  Attention and Concentration: easily distractible; per interview/observation with patient  Fund of Knowledge: vocabulary appropriate; based on history, vocabulary, fund of knowledge, syntax, grammar, and content  Insight: limited; based on understanding of severity of illness and HPI  Judgment: limited; based on patient's behavior and HPI    ASSESSMENT/PLAN:   Problems Addressed/Diagnoses:  Bipolar I Disorder, Current episode manic, severe with psychotic features     Past Medical History:   Diagnosis Date    Bipolar disorder     History of psychiatric hospitalization     Hx of psychiatric care     Psychiatric problem         Plan:  Medication Management  Olanzapine 15mg PO QD  Trazodone 50mg PO QHS  Ziprasidone 10mg IM Q12hr PRN agitation  Legal  Continue PEC  Disposition  Recommend inpatient psychiatric  hospitalization  Psychiatry will continue to follow        Bello Booker

## 2025-07-03 NOTE — PROGRESS NOTES
Hospital Medicine  Progress Note    Patient Name: Jurgen Berg  MRN: [id number]  Status: IP- Inpatient   Admission Date: 6/30/2025  Length of Stay: 2  Date of Service: 07/03/2025       CC: hospital follow-up for rhabdomyolysis       SUBJECTIVE   HPI and hospital course reviewed.  Today: Patient seen and examined at bedside, and chart reviewed.  CK trending down, 9000 today; renal function remains normal.  Thought process remains abnormal, speech a bit pressured.      MEDICATIONS   Scheduled   enoxparin  40 mg Subcutaneous Q24H (prophylaxis, 1700)    OLANZapine  10 mg Oral Daily    traZODone  50 mg Oral QHS     Continuous Infusions   0.9% NaCl   Intravenous Continuous 150 mL/hr at 07/03/25 0915 New Bag at 07/03/25 0915       PHYSICAL EXAM   VITALS: T 98.1 °F (36.7 °C)   /83   P (!) 58   RR 18   O2 95 %    GENERAL: Awake and in NAD  LUNGS: CTA anteriorly  CVS: Normal rate  GI/: Soft, NT, bowel sounds positive.  EXTREMITIES: No LE edema  NEURO: Awake, alert mostly oriented  PSYCH: Calm and cooperative at present      LABS   CBC  Recent Labs     07/02/25  0458 07/03/25  0514   WBC 8.00 6.53   RBC 4.41* 5.00   HGB 13.4* 15.1   HCT 40.2* 44.5   MCV 91.2 89.0   MCH 30.4 30.2   MCHC 33.3 33.9   RDW 13.0 12.6    200     CHEM  Recent Labs     07/01/25  0439 07/01/25  1539 07/02/25  0458 07/03/25  0514     --  142 142   K 3.7  --  4.2 3.9   CO2 24  --  27 30*   BUN 7.7*  --  6.7* 7.8*   CREATININE 0.85  --  0.86 0.85   CALCIUM 7.8*  --  8.4 8.8   MG 2.20  --   --   --    ALBUMIN 3.4*  --  3.4* 3.7   GLOBULIN 2.5  --  2.7 3.3   ALKPHOS 46  --  52 53   ALT 38  --  60* 73*   *  --  170* 153*   BILITOT 1.3  --  0.8 1.0   TSH  --  1.007  --   --          MICROBIOLOGY     Microbiology Results (last 7 days)       Procedure Component Value Units Date/Time    Blood culture #1 **CANNOT BE ORDERED STAT** [[id number]]  (Normal) Collected: 06/30/25 1946    Order Status: Completed Specimen: Blood from Antecubital,  Right Updated: 07/03/25 0011     Blood Culture No Growth At 48 Hours    Blood culture #2 **CANNOT BE ORDERED STAT** [[id number]]  (Normal) Collected: 06/30/25 1951    Order Status: Completed Specimen: Blood from Wrist, Left Updated: 07/03/25 0011     Blood Culture No Growth At 48 Hours              ASSESSMENT   Rhabdomyolysis, etiology unclear  ZELDA s/t above, resolved  AGMA s/t above, resolved  Bipolar I d/o with manic episode and severe psychotic features     PLAN   Continue IV fluid hydration and trending of CPK levels  Appreciate psych further input for medication adjustments  Patient will need inpatient psychiatric placement once CPK further improves  Otherwise continue current management and monitoring in the interim        Prophylaxis: SC Lovenox        Sly Ryan MD  [facility]

## 2025-07-04 LAB — CK SERPL-CCNC: 4146 U/L (ref 30–200)

## 2025-07-04 PROCEDURE — 11000001 HC ACUTE MED/SURG PRIVATE ROOM

## 2025-07-04 PROCEDURE — 82550 ASSAY OF CK (CPK): CPT | Performed by: INTERNAL MEDICINE

## 2025-07-04 PROCEDURE — 25000003 PHARM REV CODE 250: Performed by: INTERNAL MEDICINE

## 2025-07-04 PROCEDURE — 25000003 PHARM REV CODE 250

## 2025-07-04 PROCEDURE — 36415 COLL VENOUS BLD VENIPUNCTURE: CPT | Performed by: INTERNAL MEDICINE

## 2025-07-04 PROCEDURE — 25000003 PHARM REV CODE 250: Performed by: LICENSED PRACTICAL NURSE

## 2025-07-04 RX ORDER — SODIUM CHLORIDE 9 MG/ML
INJECTION, SOLUTION INTRAVENOUS CONTINUOUS
Status: DISCONTINUED | OUTPATIENT
Start: 2025-07-04 | End: 2025-07-08 | Stop reason: HOSPADM

## 2025-07-04 RX ORDER — DIVALPROEX SODIUM 250 MG/1
250 TABLET, DELAYED RELEASE ORAL 2 TIMES DAILY
Status: DISCONTINUED | OUTPATIENT
Start: 2025-07-04 | End: 2025-07-08 | Stop reason: HOSPADM

## 2025-07-04 RX ADMIN — OLANZAPINE 15 MG: 5 TABLET, FILM COATED ORAL at 08:07

## 2025-07-04 RX ADMIN — DIVALPROEX SODIUM 250 MG: 250 TABLET, DELAYED RELEASE ORAL at 02:07

## 2025-07-04 RX ADMIN — DIVALPROEX SODIUM 250 MG: 250 TABLET, DELAYED RELEASE ORAL at 09:07

## 2025-07-04 RX ADMIN — SODIUM CHLORIDE: 9 INJECTION, SOLUTION INTRAVENOUS at 02:07

## 2025-07-04 RX ADMIN — SODIUM CHLORIDE: 9 INJECTION, SOLUTION INTRAVENOUS at 08:07

## 2025-07-04 RX ADMIN — TRAZODONE HYDROCHLORIDE 50 MG: 50 TABLET ORAL at 09:07

## 2025-07-04 NOTE — PROGRESS NOTES
"7/4/2025  Jurgen Berg   1995   [id number]        Psychiatry Progress Note     Chief Complaint: "okay."    SUBJECTIVE:   Jurgen Berg is a 29 y.o. male with a history of bipolar disorder who presented to [facility] ED yesterday (06/30/25) after family called EMS for bizarre behavior and non-adherence with medications. On arrival he was documented to be agitated, hallucinating, yelling, and shaking. WBC 20.28 and improving. Cr 1.54 on admission. AG 22 with bicarb 15. Lactate normal. CPK 11,284. . Utox positive for marijuana. UA without signs of infection. He reports that he was last inpatient in August and has not taken his medications for several months. States he feels "manic" and currently displays a euthymic affect. Displays grandiose and Uatsdin delusions. Displays some confusion and reports recently impaired sleep but unable to report how long he has been suffering with poor sleep. He denies SI, HI, or auditory/visual hallucinations.      Initially asleep but easily awoken. States his mood as "I feel everything" and also states "heaven is around me". Later during the interview displays an irritable affect and states "why are you treating me as an experiment". Thought content continues to be dominated by Uatsdin thoughts. Denies SI/HI or auditory/visual hallucinations. Reports that he slept well last night. No behavioral issues reported from sitter.     7/4/25: Pt seen today in assigned room per psych follow up. PT reports mood is "doing better." PT denies depression, reports some anxiety and a "feeling" he describes when he gets off his medications, that sounds like angelo, pt hypomanic currently, speech fast but mostly normal. PT denies hallucinations, S.I. or H.I. at this time. Pt reports he is doing better on his current medications. PT does not appear in acute distress at this time.       Current Medications:   Scheduled Meds:    enoxparin  40 mg Subcutaneous Q24H (prophylaxis, 1700)    OLANZapine  " 15 mg Oral Daily    traZODone  50 mg Oral QHS      PRN Meds:   Current Facility-Administered Medications:     acetaminophen, 650 mg, Oral, Q4H PRN    aluminum-magnesium hydroxide-simethicone, 30 mL, Oral, QID PRN    bisacodyL, 10 mg, Rectal, Daily PRN    dextrose 50%, 12.5 g, Intravenous, PRN    dextrose 50%, 25 g, Intravenous, PRN    glucagon (human recombinant), 1 mg, Intramuscular, PRN    glucose, 16 g, Oral, PRN    glucose, 24 g, Oral, PRN    melatonin, 6 mg, Oral, Nightly PRN    polyethylene glycol, 17 g, Oral, BID PRN    sodium chloride 0.9%, 10 mL, Intravenous, PRN    sodium chloride 0.9%, 10 mL, Intravenous, PRN    ziprasidone, 10 mg, Intramuscular, Q12H PRN   Psychotherapeutics (From admission, onward)      Start     Stop Route Frequency Ordered    07/04/25 0900  OLANZapine tablet 15 mg         -- Oral Daily 07/03/25 1536    07/01/25 2100  traZODone tablet 50 mg         -- Oral Nightly 07/01/25 1050    07/01/25 1633  ziprasidone injection 10 mg         -- IM Every 12 hours PRN 07/01/25 1534    07/01/25 1520  LORazepam (ATIVAN) 2 mg/mL injection        Note to Pharmacy: Created by cabinet override    07/02/25 0329   07/01/25 1520            Allergies:   Review of patient's allergies indicates:  No Known Allergies     OBJECTIVE:   Vitals   Vitals:    07/04/25 1100   BP: 120/71   Pulse: 80   Resp: 18   Temp: 97.9 °F (36.6 °C)        Labs/Imaging/Studies:   Recent Results (from the past 36 hours)   Comprehensive Metabolic Panel    Collection Time: 07/03/25  5:14 AM   Result Value Ref Range    Sodium 142 136 - 145 mmol/L    Potassium 3.9 3.5 - 5.1 mmol/L    Chloride 107 98 - 107 mmol/L    CO2 30 (H) 22 - 29 mmol/L    Glucose 92 74 - 100 mg/dL    Blood Urea Nitrogen 7.8 (L) 8.9 - 20.6 mg/dL    Creatinine 0.85 0.72 - 1.25 mg/dL    Calcium 8.8 8.4 - 10.2 mg/dL    Protein Total 7.0 6.4 - 8.3 gm/dL    Albumin 3.7 3.5 - 5.0 g/dL    Globulin 3.3 2.4 - 3.5 gm/dL    Albumin/Globulin Ratio 1.1 1.1 - 2.0 ratio    Bilirubin  "Total 1.0 <=1.5 mg/dL    ALP 53 40 - 150 unit/L    ALT 73 (H) 0 - 55 unit/L     (H) 11 - 45 unit/L    eGFR >60 mL/min/1.73/m2    Anion Gap 5.0 mEq/L    BUN/Creatinine Ratio 9    CK    Collection Time: 07/03/25  5:14 AM   Result Value Ref Range    Creatine Kinase 9,348 (H) 30 - 200 U/L   CBC with Differential    Collection Time: 07/03/25  5:14 AM   Result Value Ref Range    WBC 6.53 4.50 - 11.50 x10(3)/mcL    RBC 5.00 4.70 - 6.10 x10(6)/mcL    Hgb 15.1 14.0 - 18.0 g/dL    Hct 44.5 42.0 - 52.0 %    MCV 89.0 80.0 - 94.0 fL    MCH 30.2 27.0 - 31.0 pg    MCHC 33.9 33.0 - 36.0 g/dL    RDW 12.6 11.5 - 17.0 %    Platelet 200 130 - 400 x10(3)/mcL    MPV 10.7 (H) 7.4 - 10.4 fL    Neut % 63.9 %    Lymph % 28.6 %    Mono % 5.5 %    Eos % 1.2 %    Basophil % 0.3 %    Imm Grans % 0.5 %    Neut # 4.17 2.1 - 9.2 x10(3)/mcL    Lymph # 1.87 0.6 - 4.6 x10(3)/mcL    Mono # 0.36 0.1 - 1.3 x10(3)/mcL    Eos # 0.08 0 - 0.9 x10(3)/mcL    Baso # 0.02 <=0.2 x10(3)/mcL    Imm Gran # 0.03 0.00 - 0.04 x10(3)/mcL    NRBC% 0.0 %   CK    Collection Time: 07/04/25  4:36 AM   Result Value Ref Range    Creatine Kinase 4,146 (H) 30 - 200 U/L              General Appearance: appears stated age, well-developed, well-nourished  Arousal: alert  Behavior: cooperative  Movements and Motor Activity: no abnormal involuntary movements noted  Orientation: oriented to person, place, time, and situation  Speech: normal rate, normal rhythm, normal volume, normal tone  Mood: "Ok"  Affect: constricted  Thought Process: linear  Associations: intact  Thought Content and Perceptions: no suicidal ideation, no homicidal ideation, no auditory hallucinations, no visual hallucinations, no paranoid ideation, no ideas of reference  Recent and Remote Memory: recent memory intact, remote memory intact; per interview/observation with patient  Attention and Concentration: intact, attentive to conversation; per interview/observation with patient  Fund of Knowledge: intact, " aware of current events, vocabulary appropriate; based on history, vocabulary, fund of knowledge, syntax, grammar, and content  Insight: questionable; based on understanding of severity of illness and HPI  Judgment: questionable; based on patient's behavior and HPI     ASSESSMENT/PLAN:   Problems Addressed/Diagnoses:    Bipolar I Disorder, Current episode manic, severe with psychotic features F31.2    Past Medical History:   Diagnosis Date    Bipolar disorder     History of psychiatric hospitalization     Hx of psychiatric care     Psychiatric problem         Plan:  Medication Management  Olanzapine 10mg PO QD  Trazodone 50mg PO QHS  Start depakote ER 250mg po BID  Ziprasidone 10mg IM Q12hr PRN agitation  Legal  Continue PEC  Disposition  Recommend inpatient psychiatric hospitalization  Psychiatry will continue to follow        Jed Ventura

## 2025-07-05 LAB — CK SERPL-CCNC: 2351 U/L (ref 30–200)

## 2025-07-05 PROCEDURE — 63600175 PHARM REV CODE 636 W HCPCS

## 2025-07-05 PROCEDURE — 25000003 PHARM REV CODE 250: Performed by: LICENSED PRACTICAL NURSE

## 2025-07-05 PROCEDURE — 82550 ASSAY OF CK (CPK): CPT | Performed by: INTERNAL MEDICINE

## 2025-07-05 PROCEDURE — 25000003 PHARM REV CODE 250: Performed by: INTERNAL MEDICINE

## 2025-07-05 PROCEDURE — 36415 COLL VENOUS BLD VENIPUNCTURE: CPT | Performed by: INTERNAL MEDICINE

## 2025-07-05 PROCEDURE — 25000003 PHARM REV CODE 250

## 2025-07-05 PROCEDURE — 11000001 HC ACUTE MED/SURG PRIVATE ROOM

## 2025-07-05 RX ADMIN — DIVALPROEX SODIUM 250 MG: 250 TABLET, DELAYED RELEASE ORAL at 08:07

## 2025-07-05 RX ADMIN — TRAZODONE HYDROCHLORIDE 50 MG: 50 TABLET ORAL at 08:07

## 2025-07-05 RX ADMIN — SODIUM CHLORIDE: 9 INJECTION, SOLUTION INTRAVENOUS at 06:07

## 2025-07-05 RX ADMIN — OLANZAPINE 15 MG: 5 TABLET, FILM COATED ORAL at 08:07

## 2025-07-05 RX ADMIN — ENOXAPARIN SODIUM 40 MG: 40 INJECTION SUBCUTANEOUS at 05:07

## 2025-07-05 RX ADMIN — SODIUM CHLORIDE: 9 INJECTION, SOLUTION INTRAVENOUS at 03:07

## 2025-07-05 RX ADMIN — SODIUM CHLORIDE: 9 INJECTION, SOLUTION INTRAVENOUS at 10:07

## 2025-07-05 NOTE — PROGRESS NOTES
"Hospital Medicine  Progress Note    Patient Name: Jurgen Berg  MRN: [id number]  Status: IP- Inpatient   Admission Date: 6/30/2025  Length of Stay: 4  Date of Service: 07/05/2025       CC: hospital follow-up for rhabdomyolysis       SUBJECTIVE   "29 year old man with a PMHx of Bipolar disorder was brought to the hospital by family due to "bizarre behavior". Vital signs grossly normal on admission. Of note patient was agitated and had briefly required restraints. WBC 20.28 and improving. Cr 1.54 on admission. AG 22 with bicarb 15. Lactate normal. CPK 11,284. . Utox positive for marijuana. UA without signs of infection.      I saw the patient in room 515 this morning. He reports that he feels like God is speaking to him and is in him. He denies auditory, tactile or visual hallucinations. He reports feeling full of energy and gambling. Lives at home with his parents. He works in a restaurant. Denies suicidal or homicidal ideations. He denies substance use apart from marijuana. Also does vaping.     Unclear etiology of rhabdomyolysis."    Today: Patient seen and examined at bedside, and chart reviewed.  CK continues to trend down, 2000 today.  Otherwise medically stable.      MEDICATIONS   Scheduled   divalproex  250 mg Oral BID    enoxparin  40 mg Subcutaneous Q24H (prophylaxis, 1700)    OLANZapine  15 mg Oral Daily    traZODone  50 mg Oral QHS     Continuous Infusions   0.9% NaCl   Intravenous Continuous 150 mL/hr at 07/05/25 1012 New Bag at 07/05/25 1012       PHYSICAL EXAM   VITALS: T 97.9 °F (36.6 °C)   /72   P 67   RR 18   O2 98 %    GENERAL: Awake and in NAD  LUNGS: CTA anteriorly  CVS: Normal rate  GI/: Soft, NT, bowel sounds positive.  EXTREMITIES: No LE edema  NEURO: Awake, alert mostly oriented  PSYCH: Calm and cooperative at present      LABS   CBC  Recent Labs     07/03/25  0514   WBC 6.53   RBC 5.00   HGB 15.1   HCT 44.5   MCV 89.0   MCH 30.2   MCHC 33.9   RDW 12.6        CHEM  Recent " Labs     07/03/25  0514      K 3.9   CO2 30*   BUN 7.8*   CREATININE 0.85   CALCIUM 8.8   ALBUMIN 3.7   GLOBULIN 3.3   ALKPHOS 53   ALT 73*   *   BILITOT 1.0         MICROBIOLOGY     Microbiology Results (last 7 days)       Procedure Component Value Units Date/Time    Blood culture #1 **CANNOT BE ORDERED STAT** [[id number]]  (Normal) Collected: 06/30/25 1946    Order Status: Completed Specimen: Blood from Antecubital, Right Updated: 07/05/25 0008     Blood Culture No Growth At 96 Hours    Blood culture #2 **CANNOT BE ORDERED STAT** [[id number]]  (Normal) Collected: 06/30/25 1951    Order Status: Completed Specimen: Blood from Wrist, Left Updated: 07/05/25 0008     Blood Culture No Growth At 96 Hours              ASSESSMENT   Rhabdomyolysis, etiology unclear  ZELDA s/t above, resolved  AGMA s/t above, resolved  Bipolar I d/o with manic episode and severe psychotic features     PLAN   Continue IV fluid hydration and trending of CPK levels  Appreciate psych further input for medication adjustments  Patient will need inpatient psychiatric placement once CPK further improves  Otherwise continue current management and monitoring in the interim        Prophylaxis: SC Lovenosergio Ryan MD  [facility]

## 2025-07-05 NOTE — PLAN OF CARE
Problem: Fall Injury Risk  Goal: Absence of Fall and Fall-Related Injury  Outcome: Progressing     Problem: Adult Inpatient Plan of Care  Goal: Plan of Care Review  Outcome: Progressing  Goal: Patient-Specific Goal (Individualized)  Outcome: Progressing  Goal: Absence of Hospital-Acquired Illness or Injury  Outcome: Progressing  Goal: Optimal Comfort and Wellbeing  Outcome: Progressing  Goal: Readiness for Transition of Care  Outcome: Progressing     Problem: Fluid Volume Deficit  Goal: Fluid Balance  Outcome: Progressing     Problem: Anxiety Signs/Symptoms  Goal: Optimized Energy Level (Anxiety Signs/Symptoms)  Outcome: Progressing  Goal: Optimized Cognitive Function (Anxiety Signs/Symptoms)  Outcome: Progressing  Goal: Improved Mood Symptoms (Anxiety Signs/Symptoms)  Outcome: Progressing  Goal: Improved Sleep (Anxiety Signs/Symptoms)  Outcome: Progressing  Goal: Enhanced Social, Occupational or Functional Skills (Anxiety Signs/Symptoms)  Outcome: Progressing  Goal: Improved Somatic Symptoms (Anxiety Signs/Symptoms)  Outcome: Progressing     Problem: Disruptive Behavior  Goal: Improved Impulse and Aggression Control (Disruptive Behavior)  Outcome: Progressing  Goal: Improved Mood Symptoms (Disruptive Behavior)  Outcome: Progressing  Goal: Improved Sleep (Disruptive Behavior)  Outcome: Progressing  Goal: Enhanced Social, Occupational or Functional Skills (Disruptive Behavior)  Outcome: Progressing

## 2025-07-05 NOTE — PROGRESS NOTES
Hospital Medicine  Progress Note    Patient Name: Jurgen Berg  MRN: [id number]  Status: IP- Inpatient   Admission Date: 6/30/2025  Length of Stay: 3  Date of Service: 07/04/2025       CC: hospital follow-up for rhabdomyolysis       SUBJECTIVE   HPI and hospital course reviewed.  Today: Patient seen and examined at bedside, and chart reviewed.  CK continues to trend down, 4000 today.  Otherwise medically stable.      MEDICATIONS   Scheduled   divalproex  250 mg Oral BID    enoxparin  40 mg Subcutaneous Q24H (prophylaxis, 1700)    OLANZapine  15 mg Oral Daily    traZODone  50 mg Oral QHS     Continuous Infusions   0.9% NaCl   Intravenous Continuous 150 mL/hr at 07/04/25 2047 New Bag at 07/04/25 2047       PHYSICAL EXAM   VITALS: T 97.9 °F (36.6 °C)   /82   P 61   RR 18   O2 98 %    GENERAL: Awake and in NAD  LUNGS: CTA anteriorly  CVS: Normal rate  GI/: Soft, NT, bowel sounds positive.  EXTREMITIES: No LE edema  NEURO: Awake, alert mostly oriented  PSYCH: Calm and cooperative at present      LABS   CBC  Recent Labs     07/02/25  0458 07/03/25  0514   WBC 8.00 6.53   RBC 4.41* 5.00   HGB 13.4* 15.1   HCT 40.2* 44.5   MCV 91.2 89.0   MCH 30.4 30.2   MCHC 33.3 33.9   RDW 13.0 12.6    200     CHEM  Recent Labs     07/02/25  0458 07/03/25  0514    142   K 4.2 3.9   CO2 27 30*   BUN 6.7* 7.8*   CREATININE 0.86 0.85   CALCIUM 8.4 8.8   ALBUMIN 3.4* 3.7   GLOBULIN 2.7 3.3   ALKPHOS 52 53   ALT 60* 73*   * 153*   BILITOT 0.8 1.0         MICROBIOLOGY     Microbiology Results (last 7 days)       Procedure Component Value Units Date/Time    Blood culture #1 **CANNOT BE ORDERED STAT** [[id number]]  (Normal) Collected: 06/30/25 1946    Order Status: Completed Specimen: Blood from Antecubital, Right Updated: 07/04/25 0005     Blood Culture No Growth At 72 Hours    Blood culture #2 **CANNOT BE ORDERED STAT** [[id number]]  (Normal) Collected: 06/30/25 1951    Order Status: Completed Specimen: Blood from  Wrist, Left Updated: 07/04/25 0005     Blood Culture No Growth At 72 Hours              ASSESSMENT   Rhabdomyolysis, etiology unclear  ZELDA s/t above, resolved  AGMA s/t above, resolved  Bipolar I d/o with manic episode and severe psychotic features     PLAN   Continue IV fluid hydration and trending of CPK levels  Appreciate psych further input for medication adjustments  Patient will need inpatient psychiatric placement once CPK further improves  Otherwise continue current management and monitoring in the interim        Prophylaxis: SC Lovenox        Sly Ryan MD  [facility]

## 2025-07-06 LAB
BACTERIA BLD CULT: NORMAL
BACTERIA BLD CULT: NORMAL
CK SERPL-CCNC: 1289 U/L (ref 30–200)
VALPROATE SERPL-MCNC: 38.3 UG/ML (ref 50–100)

## 2025-07-06 PROCEDURE — 25000003 PHARM REV CODE 250: Performed by: INTERNAL MEDICINE

## 2025-07-06 PROCEDURE — 82550 ASSAY OF CK (CPK): CPT | Performed by: INTERNAL MEDICINE

## 2025-07-06 PROCEDURE — 11000001 HC ACUTE MED/SURG PRIVATE ROOM

## 2025-07-06 PROCEDURE — 63600175 PHARM REV CODE 636 W HCPCS

## 2025-07-06 PROCEDURE — 80164 ASSAY DIPROPYLACETIC ACD TOT: CPT | Performed by: LICENSED PRACTICAL NURSE

## 2025-07-06 PROCEDURE — 36415 COLL VENOUS BLD VENIPUNCTURE: CPT | Performed by: INTERNAL MEDICINE

## 2025-07-06 PROCEDURE — 25000003 PHARM REV CODE 250

## 2025-07-06 PROCEDURE — 25000003 PHARM REV CODE 250: Performed by: LICENSED PRACTICAL NURSE

## 2025-07-06 RX ADMIN — TRAZODONE HYDROCHLORIDE 50 MG: 50 TABLET ORAL at 09:07

## 2025-07-06 RX ADMIN — SODIUM CHLORIDE: 9 INJECTION, SOLUTION INTRAVENOUS at 11:07

## 2025-07-06 RX ADMIN — DIVALPROEX SODIUM 250 MG: 250 TABLET, DELAYED RELEASE ORAL at 09:07

## 2025-07-06 RX ADMIN — OLANZAPINE 15 MG: 5 TABLET, FILM COATED ORAL at 08:07

## 2025-07-06 RX ADMIN — SODIUM CHLORIDE: 9 INJECTION, SOLUTION INTRAVENOUS at 04:07

## 2025-07-06 RX ADMIN — SODIUM CHLORIDE: 9 INJECTION, SOLUTION INTRAVENOUS at 08:07

## 2025-07-06 RX ADMIN — SODIUM CHLORIDE: 9 INJECTION, SOLUTION INTRAVENOUS at 01:07

## 2025-07-06 RX ADMIN — ENOXAPARIN SODIUM 40 MG: 40 INJECTION SUBCUTANEOUS at 04:07

## 2025-07-06 RX ADMIN — DIVALPROEX SODIUM 250 MG: 250 TABLET, DELAYED RELEASE ORAL at 08:07

## 2025-07-06 NOTE — PROGRESS NOTES
"Hospital Medicine  Progress Note    Patient Name: Jurgen Berg  MRN: [id number]  Status: IP- Inpatient   Admission Date: 6/30/2025  Length of Stay: 5  Date of Service: 07/06/2025       CC: hospital follow-up for rhabdomyolysis       SUBJECTIVE   "29 year old man with a PMHx of Bipolar disorder was brought to the hospital by family due to "bizarre behavior". Vital signs grossly normal on admission. Of note patient was agitated and had briefly required restraints. WBC 20.28 and improving. Cr 1.54 on admission. AG 22 with bicarb 15. Lactate normal. CPK 11,284. . Utox positive for marijuana. UA without signs of infection.      I saw the patient in room 515 this morning. He reports that he feels like God is speaking to him and is in him. He denies auditory, tactile or visual hallucinations. He reports feeling full of energy and gambling. Lives at home with his parents. He works in a restaurant. Denies suicidal or homicidal ideations. He denies substance use apart from marijuana. Also does vaping.     Unclear etiology of rhabdomyolysis."    Today: Patient seen and examined at bedside, and chart reviewed.  CK down, 1200 today.  Otherwise medically stable.      MEDICATIONS   Scheduled   divalproex  250 mg Oral BID    enoxparin  40 mg Subcutaneous Q24H (prophylaxis, 1700)    OLANZapine  15 mg Oral Daily    traZODone  50 mg Oral QHS     Continuous Infusions   0.9% NaCl   Intravenous Continuous 150 mL/hr at 07/06/25 0820 New Bag at 07/06/25 0820       PHYSICAL EXAM   VITALS: T 98 °F (36.7 °C)   /81   P (!) 53   RR 18   O2 99 %    GENERAL: Awake and in NAD  LUNGS: CTA anteriorly  CVS: Normal rate  GI/: Soft, NT, bowel sounds positive.  EXTREMITIES: No LE edema  NEURO: Awake, alert mostly oriented  PSYCH: Calm and cooperative at present      LABS   CBC  No results for input(s): "WBC", "RBC", "HGB", "HCT", "MCV", "MCH", "MCHC", "RDW", "PLT", "RETIC", "ESR" in the last 72 hours.    CHEM  No results for input(s): "NA", " ""K", "CHLORIDE", "CO2", "BUN", "CREATININE", "GLUCOSE", "CALCIUM", "MG", "PHOS", "ALBUMIN", "GLOBULIN", "ALKPHOS", "ALT", "AST", "BILITOT", "LIPASE", "CRP", "LDH", "HAPTOGLOBIN", "FERRITIN", "IRON", "TIBC", "TSH", "FREET4" in the last 72 hours.    Recent Labs     07/04/25  0436 07/05/25  0449 07/06/25  0452   CPK 4,146* 2,351* 1,289*            ASSESSMENT   Rhabdomyolysis, etiology unclear  ZELDA s/t above, resolved  AGMA s/t above, resolved  Bipolar I d/o with manic episode and severe psychotic features     PLAN   Continue IV fluid hydration and trending of CPK levels  Inpatient psych placement once CPK down to desired level  Otherwise continue current management and monitoring in the interim, including PEC and 1:1 sitter  Psych on board here for medication adjustments        Prophylaxis: SC Lovenosergio Ryan MD  Hospital Medicine      "

## 2025-07-06 NOTE — PROGRESS NOTES
"7/6/2025  Jurgen Berg   1995   [id number]        Psychiatry Progress Note     Chief Complaint: "I am good."    SUBJECTIVE:   Jurgen Berg is a 29 y.o. male with a history of bipolar disorder who presented to [facility] ED yesterday (06/30/25) after family called EMS for bizarre behavior and non-adherence with medications. On arrival he was documented to be agitated, hallucinating, yelling, and shaking. WBC 20.28 and improving. Cr 1.54 on admission. AG 22 with bicarb 15. Lactate normal. CPK 11,284. . Utox positive for marijuana. UA without signs of infection. He reports that he was last inpatient in August and has not taken his medications for several months. States he feels "manic" and currently displays a euthymic affect. Displays grandiose and Zoroastrianism delusions. Displays some confusion and reports recently impaired sleep but unable to report how long he has been suffering with poor sleep. He denies SI, HI, or auditory/visual hallucinations.      Initially asleep but easily awoken. States his mood as "I feel everything" and also states "heaven is around me". Later during the interview displays an irritable affect and states "why are you treating me as an experiment". Thought content continues to be dominated by Zoroastrianism thoughts. Denies SI/HI or auditory/visual hallucinations. Reports that he slept well last night. No behavioral issues reported from sitter.      7/4/25: Pt seen today in assigned room per psych follow up. PT reports mood is "doing better." PT denies depression, reports some anxiety and a "feeling" he describes when he gets off his medications, that sounds like angelo, pt hypomanic currently, speech fast but mostly normal. PT denies hallucinations, S.I. or H.I. at this time. Pt reports he is doing better on his current medications. PT does not appear in acute distress at this time.    7/6/25:Pt seen today in assigned room per psych follow up. PT reports mood is "I am okay" PT denies " depression, and anxiety. Pt affect calmer today and speech much more normal rate then previous. Pt tolerating medications, denies side effects. PT denies hallucinations, S.I. or H.I. at this time. Pt reports he is doing better on his current medications. PT does not appear in acute distress at this time.       Current Medications:   Scheduled Meds:    divalproex  250 mg Oral BID    enoxparin  40 mg Subcutaneous Q24H (prophylaxis, 1700)    OLANZapine  15 mg Oral Daily    traZODone  50 mg Oral QHS      PRN Meds:   Current Facility-Administered Medications:     acetaminophen, 650 mg, Oral, Q4H PRN    aluminum-magnesium hydroxide-simethicone, 30 mL, Oral, QID PRN    bisacodyL, 10 mg, Rectal, Daily PRN    dextrose 50%, 12.5 g, Intravenous, PRN    dextrose 50%, 25 g, Intravenous, PRN    glucagon (human recombinant), 1 mg, Intramuscular, PRN    glucose, 16 g, Oral, PRN    glucose, 24 g, Oral, PRN    melatonin, 6 mg, Oral, Nightly PRN    polyethylene glycol, 17 g, Oral, BID PRN    sodium chloride 0.9%, 10 mL, Intravenous, PRN    sodium chloride 0.9%, 10 mL, Intravenous, PRN    ziprasidone, 10 mg, Intramuscular, Q12H PRN   Psychotherapeutics (From admission, onward)      Start     Stop Route Frequency Ordered    07/04/25 0900  OLANZapine tablet 15 mg         -- Oral Daily 07/03/25 1536    07/01/25 2100  traZODone tablet 50 mg         -- Oral Nightly 07/01/25 1050    07/01/25 1633  ziprasidone injection 10 mg         -- IM Every 12 hours PRN 07/01/25 1534    07/01/25 1520  LORazepam (ATIVAN) 2 mg/mL injection        Note to Pharmacy: Created by cabinet override    07/02/25 0329   07/01/25 1520            Allergies:   Review of patient's allergies indicates:  No Known Allergies     OBJECTIVE:   Vitals   Vitals:    07/06/25 1157   BP: 130/81   Pulse: (!) 53   Resp: 18   Temp: 98 °F (36.7 °C)        Labs/Imaging/Studies:   Recent Results (from the past 36 hours)   CK    Collection Time: 07/05/25  4:49 AM   Result Value Ref Range  "   Creatine Kinase 2,351 (H) 30 - 200 U/L   CK    Collection Time: 07/06/25  4:52 AM   Result Value Ref Range    Creatine Kinase 1,289 (H) 30 - 200 U/L                General Appearance: appears stated age, well-developed, well-nourished  Arousal: alert  Behavior: cooperative  Movements and Motor Activity: no abnormal involuntary movements noted  Orientation: oriented to person, place, time, and situation  Speech: normal rate, normal rhythm, normal volume, normal tone  Mood: "Ok"  Affect: constricted  Thought Process: linear  Associations: intact  Thought Content and Perceptions: no suicidal ideation, no homicidal ideation, no auditory hallucinations, no visual hallucinations, no paranoid ideation, no ideas of reference  Recent and Remote Memory: recent memory intact, remote memory intact; per interview/observation with patient  Attention and Concentration: intact, attentive to conversation; per interview/observation with patient  Fund of Knowledge: intact, aware of current events, vocabulary appropriate; based on history, vocabulary, fund of knowledge, syntax, grammar, and content  Insight: questionable; based on understanding of severity of illness and HPI  Judgment: questionable; based on patient's behavior and HPI     ASSESSMENT/PLAN:   Problems Addressed/Diagnoses:  Bipolar I Disorder, Current episode manic, severe with psychotic features F31.2     Past Medical History:   Diagnosis Date    Bipolar disorder     History of psychiatric hospitalization     Hx of psychiatric care     Psychiatric problem         Plan:  Medication Management  Olanzapine 10mg PO QD  Trazodone 50mg PO QHS  depakote ER 500mg po BID  Ziprasidone 10mg IM Q12hr PRN agitation  Valporic acid level  Legal  Continue PEC  Disposition  Recommend inpatient psychiatric hospitalization  Psychiatry will continue to follow               Jed Ventura    "

## 2025-07-07 LAB
ALBUMIN SERPL-MCNC: 3.6 G/DL (ref 3.5–5)
ALBUMIN/GLOB SERPL: 1.1 RATIO (ref 1.1–2)
ALP SERPL-CCNC: 75 UNIT/L (ref 40–150)
ALT SERPL-CCNC: 54 UNIT/L (ref 0–55)
ANION GAP SERPL CALC-SCNC: 6 MEQ/L
AST SERPL-CCNC: 33 UNIT/L (ref 11–45)
BILIRUB SERPL-MCNC: 0.6 MG/DL
BUN SERPL-MCNC: 7.5 MG/DL (ref 8.9–20.6)
CALCIUM SERPL-MCNC: 8.5 MG/DL (ref 8.4–10.2)
CHLORIDE SERPL-SCNC: 107 MMOL/L (ref 98–107)
CK SERPL-CCNC: 761 U/L (ref 30–200)
CK SERPL-CCNC: 858 U/L (ref 30–200)
CO2 SERPL-SCNC: 26 MMOL/L (ref 22–29)
CREAT SERPL-MCNC: 0.74 MG/DL (ref 0.72–1.25)
CREAT/UREA NIT SERPL: 10
GFR SERPLBLD CREATININE-BSD FMLA CKD-EPI: >60 ML/MIN/1.73/M2
GLOBULIN SER-MCNC: 3.2 GM/DL (ref 2.4–3.5)
GLUCOSE SERPL-MCNC: 126 MG/DL (ref 74–100)
POTASSIUM SERPL-SCNC: 4.3 MMOL/L (ref 3.5–5.1)
PROT SERPL-MCNC: 6.8 GM/DL (ref 6.4–8.3)
SODIUM SERPL-SCNC: 139 MMOL/L (ref 136–145)

## 2025-07-07 PROCEDURE — 63600175 PHARM REV CODE 636 W HCPCS

## 2025-07-07 PROCEDURE — 36415 COLL VENOUS BLD VENIPUNCTURE: CPT | Performed by: INTERNAL MEDICINE

## 2025-07-07 PROCEDURE — 80053 COMPREHEN METABOLIC PANEL: CPT | Performed by: INTERNAL MEDICINE

## 2025-07-07 PROCEDURE — 11000001 HC ACUTE MED/SURG PRIVATE ROOM

## 2025-07-07 PROCEDURE — 25000003 PHARM REV CODE 250: Performed by: LICENSED PRACTICAL NURSE

## 2025-07-07 PROCEDURE — 25000003 PHARM REV CODE 250: Performed by: INTERNAL MEDICINE

## 2025-07-07 PROCEDURE — 82550 ASSAY OF CK (CPK): CPT | Performed by: INTERNAL MEDICINE

## 2025-07-07 PROCEDURE — 25000003 PHARM REV CODE 250

## 2025-07-07 RX ADMIN — SODIUM CHLORIDE: 9 INJECTION, SOLUTION INTRAVENOUS at 03:07

## 2025-07-07 RX ADMIN — DIVALPROEX SODIUM 250 MG: 250 TABLET, DELAYED RELEASE ORAL at 08:07

## 2025-07-07 RX ADMIN — SODIUM CHLORIDE: 9 INJECTION, SOLUTION INTRAVENOUS at 10:07

## 2025-07-07 RX ADMIN — TRAZODONE HYDROCHLORIDE 50 MG: 50 TABLET ORAL at 08:07

## 2025-07-07 RX ADMIN — ENOXAPARIN SODIUM 40 MG: 40 INJECTION SUBCUTANEOUS at 06:07

## 2025-07-07 RX ADMIN — OLANZAPINE 15 MG: 5 TABLET, FILM COATED ORAL at 08:07

## 2025-07-07 RX ADMIN — SODIUM CHLORIDE: 9 INJECTION, SOLUTION INTRAVENOUS at 05:07

## 2025-07-07 NOTE — PROGRESS NOTES
"Hospital Medicine  Progress Note    Patient Name: Jurgen Berg  MRN: [id number]  Status: IP- Inpatient   Admission Date: 6/30/2025  Length of Stay: 6  Date of Service: 07/07/2025       CC: hospital follow-up for rhabdomyolysis       SUBJECTIVE   "29 year old man with a PMHx of Bipolar disorder was brought to the hospital by family due to "bizarre behavior". Vital signs grossly normal on admission. Of note patient was agitated and had briefly required restraints. WBC 20.28 and improving. Cr 1.54 on admission. AG 22 with bicarb 15. Lactate normal. CPK 11,284. . Utox positive for marijuana. UA without signs of infection.      I saw the patient in room 515 this morning. He reports that he feels like God is speaking to him and is in him. He denies auditory, tactile or visual hallucinations. He reports feeling full of energy and gambling. Lives at home with his parents. He works in a restaurant. Denies suicidal or homicidal ideations. He denies substance use apart from marijuana. Also does vaping.     Unclear etiology of rhabdomyolysis."    Today: Patient seen and examined at bedside, and chart reviewed.  CKD on the 850 this morning, patient calm cooperative no further signs of manic episode.      MEDICATIONS   Scheduled   divalproex  250 mg Oral BID    enoxparin  40 mg Subcutaneous Q24H (prophylaxis, 1700)    OLANZapine  15 mg Oral Daily    traZODone  50 mg Oral QHS     Continuous Infusions   0.9% NaCl   Intravenous Continuous 150 mL/hr at 07/07/25 1506 New Bag at 07/07/25 1506       PHYSICAL EXAM   VITALS: T 98.2 °F (36.8 °C)   /84   P 69   RR 18   O2 98 %    GENERAL: awake and in no acute distress  LUNGS: Respirations non-labored, no peripheral cyanosis  CVS: Normal rate  ABD: Soft, non-tender  EXTREMITIES: Radial pulse 2+  NEURO: AAOx3  PSYCH: Calm and cooperative at present      LABS   CBC  No results for input(s): "WBC", "RBC", "HGB", "HCT", "MCV", "MCH", "MCHC", "RDW", "PLT", "RETIC", "ESR" in the last " 72 hours.    CHEM  Recent Labs     07/07/25  0656      K 4.3   CO2 26   BUN 7.5*   CREATININE 0.74   CALCIUM 8.5   ALBUMIN 3.6   GLOBULIN 3.2   ALKPHOS 75   ALT 54   AST 33   BILITOT 0.6       Recent Labs     07/06/25  0452 07/07/25  0656 07/07/25  1458   CPK 1,289* 858* 761*            ASSESSMENT   Rhabdomyolysis, etiology unclear  ZELDA s/t above, resolved  AGMA s/t above, resolved  Bipolar I d/o with manic episode and severe psychotic features     PLAN   Continue IV fluid hydration will repeat CPK levels again this evening, if < 500 will be appropriate for psych placement  Will have Psychiatry re-evaluate to see if inpatient psych placement of still needed  Otherwise continue current management and monitoring in the interim        Prophylaxis: SC Lovenosergio Ryan MD  [facility]

## 2025-07-07 NOTE — PROGRESS NOTES
"7/7/2025  Jurgen Berg   1995   [id number]        Psychiatry Progress Note     Chief Complaint: "I feel good."    SUBJECTIVE:   Jurgen Berg is a 29 y.o. male with a history of bipolar disorder who presented to [facility] ED yesterday (06/30/25) after family called EMS for bizarre behavior and non-adherence with medications. On arrival he was documented to be agitated, hallucinating, yelling, and shaking. WBC 20.28 and improving. Cr 1.54 on admission. AG 22 with bicarb 15. Lactate normal. CPK 11,284. . Utox positive for marijuana. UA without signs of infection. He reports that he was last inpatient in August and has not taken his medications for several months. States he feels "manic" and currently displays a euthymic affect. Displays grandiose and Anglican delusions. Displays some confusion and reports recently impaired sleep but unable to report how long he has been suffering with poor sleep. He denies SI, HI, or auditory/visual hallucinations.      Initially asleep but easily awoken. States his mood as "I feel everything" and also states "heaven is around me". Later during the interview displays an irritable affect and states "why are you treating me as an experiment". Thought content continues to be dominated by Anglican thoughts. Denies SI/HI or auditory/visual hallucinations. Reports that he slept well last night. No behavioral issues reported from sitter.      7/4/25: Pt seen today in assigned room per psych follow up. PT reports mood is "doing better." PT denies depression, reports some anxiety and a "feeling" he describes when he gets off his medications, that sounds like angelo, pt hypomanic currently, speech fast but mostly normal. PT denies hallucinations, S.I. or H.I. at this time. Pt reports he is doing better on his current medications. PT does not appear in acute distress at this time.     7/6/25:Pt seen today in assigned room per psych follow up. PT reports mood is "I am okay" PT denies " depression, and anxiety. Pt affect calmer today and speech much more normal rate then previous. Pt tolerating medications, denies side effects. PT denies hallucinations, S.I. or H.I. at this time. Pt reports he is doing better on his current medications. PT does not appear in acute distress at this time.    7/7/25: Pt seen today for psychiatric follow up, pt state mood is good. Pt tolerating current medications denies side effects. Pt calm in no acute distress, speech normal R/R/V and thought content free from suicidal or homicidal thoughts. Pt no longer tangential or talking about bizarre things/associations. Stressed the importance of patient to follow up with outpatient provider for medication management, because he does respond well to medications when he takes them. Okay to rescind PEC from psychiatric standpoint pt has spent enough days in the hospital and his medications were adjusted to which he is doing well on currently.       Current Medications:   Scheduled Meds:    divalproex  250 mg Oral BID    enoxparin  40 mg Subcutaneous Q24H (prophylaxis, 1700)    OLANZapine  15 mg Oral Daily    traZODone  50 mg Oral QHS      PRN Meds:   Current Facility-Administered Medications:     acetaminophen, 650 mg, Oral, Q4H PRN    aluminum-magnesium hydroxide-simethicone, 30 mL, Oral, QID PRN    bisacodyL, 10 mg, Rectal, Daily PRN    dextrose 50%, 12.5 g, Intravenous, PRN    dextrose 50%, 25 g, Intravenous, PRN    glucagon (human recombinant), 1 mg, Intramuscular, PRN    glucose, 16 g, Oral, PRN    glucose, 24 g, Oral, PRN    melatonin, 6 mg, Oral, Nightly PRN    polyethylene glycol, 17 g, Oral, BID PRN    sodium chloride 0.9%, 10 mL, Intravenous, PRN    sodium chloride 0.9%, 10 mL, Intravenous, PRN    ziprasidone, 10 mg, Intramuscular, Q12H PRN   Psychotherapeutics (From admission, onward)      Start     Stop Route Frequency Ordered    07/04/25 0900  OLANZapine tablet 15 mg         -- Oral Daily 07/03/25 1536    07/01/25  2100  traZODone tablet 50 mg         -- Oral Nightly 07/01/25 1050    07/01/25 1633  ziprasidone injection 10 mg         -- IM Every 12 hours PRN 07/01/25 1534    07/01/25 1520  LORazepam (ATIVAN) 2 mg/mL injection        Note to Pharmacy: Created by cabinet override    07/02/25 0329   07/01/25 1520            Allergies:   Review of patient's allergies indicates:  No Known Allergies     OBJECTIVE:   Vitals   Vitals:    07/07/25 1101   BP: (!) 141/80   Pulse: 60   Resp: 18   Temp: 98.6 °F (37 °C)        Labs/Imaging/Studies:   Recent Results (from the past 36 hours)   CK    Collection Time: 07/06/25  4:52 AM   Result Value Ref Range    Creatine Kinase 1,289 (H) 30 - 200 U/L   Valproic Acid    Collection Time: 07/06/25  4:52 AM   Result Value Ref Range    Valproic Acid 38.3 (L) 50.0 - 100.0 ug/ml   CK    Collection Time: 07/07/25  6:56 AM   Result Value Ref Range    Creatine Kinase 858 (H) 30 - 200 U/L   Comprehensive Metabolic Panel    Collection Time: 07/07/25  6:56 AM   Result Value Ref Range    Sodium 139 136 - 145 mmol/L    Potassium 4.3 3.5 - 5.1 mmol/L    Chloride 107 98 - 107 mmol/L    CO2 26 22 - 29 mmol/L    Glucose 126 (H) 74 - 100 mg/dL    Blood Urea Nitrogen 7.5 (L) 8.9 - 20.6 mg/dL    Creatinine 0.74 0.72 - 1.25 mg/dL    Calcium 8.5 8.4 - 10.2 mg/dL    Protein Total 6.8 6.4 - 8.3 gm/dL    Albumin 3.6 3.5 - 5.0 g/dL    Globulin 3.2 2.4 - 3.5 gm/dL    Albumin/Globulin Ratio 1.1 1.1 - 2.0 ratio    Bilirubin Total 0.6 <=1.5 mg/dL    ALP 75 40 - 150 unit/L    ALT 54 0 - 55 unit/L    AST 33 11 - 45 unit/L    eGFR >60 mL/min/1.73/m2    Anion Gap 6.0 mEq/L    BUN/Creatinine Ratio 10                 General Appearance: appears stated age, well-developed, well-nourished  Arousal: alert  Behavior: cooperative  Movements and Motor Activity: no abnormal involuntary movements noted  Orientation: oriented to person, place, time, and situation  Speech: normal rate, normal rhythm, normal volume, normal tone  Mood:  ""Ok"  Affect: constricted  Thought Process: linear  Associations: intact  Thought Content and Perceptions: no suicidal ideation, no homicidal ideation, no auditory hallucinations, no visual hallucinations, no paranoid ideation, no ideas of reference  Recent and Remote Memory: recent memory intact, remote memory intact; per interview/observation with patient  Attention and Concentration: intact, attentive to conversation; per interview/observation with patient  Fund of Knowledge: intact, aware of current events, vocabulary appropriate; based on history, vocabulary, fund of knowledge, syntax, grammar, and content  Insight: questionable; based on understanding of severity of illness and HPI  Judgment: questionable; based on patient's behavior and HPI     ASSESSMENT/PLAN:   Problems Addressed/Diagnoses:  Bipolar I Disorder, Current episode manic, severe with psychotic features F31.2     Past Medical History:   Diagnosis Date    Bipolar disorder     History of psychiatric hospitalization     Hx of psychiatric care     Psychiatric problem       Plan:     Expand All Collapse All    7/6/2025  Jurgen Berg   1995   [id number]                                                               Psychiatry Progress Note      Chief Complaint: "I am good."     SUBJECTIVE:   Jurgen Berg is a 29 y.o. male with a history of bipolar disorder who presented to [facility] ED yesterday (06/30/25) after family called EMS for bizarre behavior and non-adherence with medications. On arrival he was documented to be agitated, hallucinating, yelling, and shaking. WBC 20.28 and improving. Cr 1.54 on admission. AG 22 with bicarb 15. Lactate normal. CPK 11,284. . Utox positive for marijuana. UA without signs of infection. He reports that he was last inpatient in August and has not taken his medications for several months. States he feels "manic" and currently displays a euthymic affect. Displays grandiose and Yazdanism delusions. Displays some " "confusion and reports recently impaired sleep but unable to report how long he has been suffering with poor sleep. He denies SI, HI, or auditory/visual hallucinations.      Initially asleep but easily awoken. States his mood as "I feel everything" and also states "heaven is around me". Later during the interview displays an irritable affect and states "why are you treating me as an experiment". Thought content continues to be dominated by Christian thoughts. Denies SI/HI or auditory/visual hallucinations. Reports that he slept well last night. [facility]      7/4/25: Pt seen today in assigned room per psych follow up. PT reports mood is "doing better." PT denies depression, reports some anxiety and a "feeling" he describes when he gets off his medications, that sounds like angelo, pt hypomanic currently, speech fast but mostly normal. PT denies hallucinations, S.I. or H.I. at this time. Pt reports he is doing better on his current medications. PT does not appear in acute distress at this time.     7/6/25:Pt seen today in assigned room per psych follow up. PT reports mood is "I am okay" PT denies depression, and anxiety. Pt affect calmer today and speech much more normal rate then previous. Pt tolerating medications, denies side effects. PT denies hallucinations, S.I. or H.I. at this time. Pt reports he is doing better on his current medications. PT does not appear in acute distress at this time.        Current Medications:   Scheduled Meds:    divalproex  250 mg Oral BID    enoxparin  40 mg Subcutaneous Q24H (prophylaxis, 1700)    OLANZapine  15 mg Oral Daily    traZODone  50 mg Oral QHS      PRN Meds:   Current Facility-Administered Medications:     acetaminophen, 650 mg, Oral, Q4H PRN    aluminum-magnesium hydroxide-simethicone, 30 mL, Oral, QID PRN    bisacodyL, 10 mg, Rectal, Daily PRN    dextrose 50%, 12.5 g, Intravenous, PRN    dextrose 50%, 25 g, Intravenous, PRN    glucagon " "(human recombinant), 1 mg, Intramuscular, PRN    glucose, 16 g, Oral, PRN    glucose, 24 g, Oral, PRN    melatonin, 6 mg, Oral, Nightly PRN    polyethylene glycol, 17 g, Oral, BID PRN    sodium chloride 0.9%, 10 mL, Intravenous, PRN    sodium chloride 0.9%, 10 mL, Intravenous, PRN    ziprasidone, 10 mg, Intramuscular, Q12H PRN   Psychotherapeutics (From admission, onward)        Start     Stop Route Frequency Ordered     07/04/25 0900   OLANZapine tablet 15 mg         -- Oral Daily 07/03/25 1536     07/01/25 2100   traZODone tablet 50 mg         -- Oral Nightly 07/01/25 1050     07/01/25 1633   ziprasidone injection 10 mg         -- IM Every 12 hours PRN 07/01/25 1534     07/01/25 1520   LORazepam (ATIVAN) 2 mg/mL injection        Note to Pharmacy: Created by cabinet override    07/02/25 0329     07/01/25 1520                Allergies:   Review of patient's allergies indicates:  No Known Allergies      OBJECTIVE:   Vitals       Vitals:     07/06/25 1157   BP: 130/81   Pulse: (!) 53   Resp: 18   Temp: 98 °F (36.7 °C)         Labs/Imaging/Studies:   Recent Results         Recent Results (from the past 36 hours)   CK     Collection Time: 07/05/25  4:49 AM   Result Value Ref Range     Creatine Kinase 2,351 (H) 30 - 200 U/L   CK     Collection Time: 07/06/25  4:52 AM   Result Value Ref Range     Creatine Kinase 1,289 (H) 30 - 200 U/L                        General Appearance: appears stated age, well-developed, well-nourished  Arousal: alert  Behavior: cooperative  Movements and Motor Activity: no abnormal involuntary movements noted  Orientation: oriented to person, place, time, and situation  Speech: normal rate, normal rhythm, normal volume, normal tone  Mood: "Ok"  Affect: constricted  Thought Process: linear  Associations: intact  Thought Content and Perceptions: no suicidal ideation, no homicidal ideation, no auditory hallucinations, no visual hallucinations, no paranoid ideation, no ideas of reference  Recent and " Remote Memory: recent memory intact, remote memory intact; per interview/observation with patient  Attention and Concentration: intact, attentive to conversation; per interview/observation with patient  Fund of Knowledge: intact, aware of current events, vocabulary appropriate; based on history, vocabulary, fund of knowledge, syntax, grammar, and content  Insight: questionable; based on understanding of severity of illness and HPI  Judgment: questionable; based on patient's behavior and HPI      ASSESSMENT/PLAN:   Problems Addressed/Diagnoses:  Bipolar I Disorder, Current episode manic, severe with psychotic features F31.2           Past Medical History:   Diagnosis Date    Bipolar disorder      History of psychiatric hospitalization      Hx of psychiatric care      Psychiatric problem           Plan:  Medication Management  Olanzapine 10mg PO QD  Trazodone 50mg PO QHS  depakote ER 500mg po BID    Legal  Good with rescinding PEC pt doing much better and no longer manic, stable on current medicatinos.  Disposition  Pt okay from psychiatric standpoint to go home when medically cleared.  If pt does not have a pcp or psychiatric provider to follow up with please assist pt per his request with setting up an appointment so he can stay on his current medications.                    Jed Ventura

## 2025-07-08 VITALS
WEIGHT: 136.25 LBS | SYSTOLIC BLOOD PRESSURE: 135 MMHG | HEART RATE: 69 BPM | BODY MASS INDEX: 21.9 KG/M2 | HEIGHT: 66 IN | DIASTOLIC BLOOD PRESSURE: 91 MMHG | RESPIRATION RATE: 20 BRPM | OXYGEN SATURATION: 99 % | TEMPERATURE: 98 F

## 2025-07-08 PROBLEM — M62.82 NON-TRAUMATIC RHABDOMYOLYSIS: Status: RESOLVED | Noted: 2025-07-08 | Resolved: 2025-07-08

## 2025-07-08 PROBLEM — M62.82 NON-TRAUMATIC RHABDOMYOLYSIS: Status: ACTIVE | Noted: 2025-07-08

## 2025-07-08 LAB — CK SERPL-CCNC: 594 U/L (ref 30–200)

## 2025-07-08 PROCEDURE — 82550 ASSAY OF CK (CPK): CPT | Performed by: INTERNAL MEDICINE

## 2025-07-08 PROCEDURE — 25000003 PHARM REV CODE 250: Performed by: LICENSED PRACTICAL NURSE

## 2025-07-08 PROCEDURE — 25000003 PHARM REV CODE 250

## 2025-07-08 PROCEDURE — 36415 COLL VENOUS BLD VENIPUNCTURE: CPT | Performed by: INTERNAL MEDICINE

## 2025-07-08 RX ORDER — OLANZAPINE 5 MG/1
5 TABLET, FILM COATED ORAL DAILY
Qty: 30 TABLET | Refills: 0 | Status: SHIPPED | OUTPATIENT
Start: 2025-07-08 | End: 2025-08-07

## 2025-07-08 RX ORDER — TRAZODONE HYDROCHLORIDE 50 MG/1
50 TABLET ORAL NIGHTLY
Qty: 30 TABLET | Refills: 0 | Status: SHIPPED | OUTPATIENT
Start: 2025-07-08 | End: 2025-08-07

## 2025-07-08 RX ADMIN — DIVALPROEX SODIUM 250 MG: 250 TABLET, DELAYED RELEASE ORAL at 07:07

## 2025-07-08 RX ADMIN — OLANZAPINE 15 MG: 5 TABLET, FILM COATED ORAL at 07:07

## 2025-07-08 NOTE — PLAN OF CARE
07/08/25 0906   Final Note   Assessment Type Final Discharge Note   Anticipated Discharge Disposition Home   Hospital Resources/Appts/Education Provided Post-Acute resouces added to AVS   Post-Acute Status   Discharge Delays None known at this time     Patient will dc home under self care today. Referral sent via [employer] to [facility] [place] to establish primary care. [facility] walk in schedule added to [place] paperwork. Patient needs to walk in for evaluation then facility will establish psych care.

## 2025-07-08 NOTE — NURSING
Discharge instructions given @ bedside to patient and family member (Teddy) @ bedside. Both verbalized understanding. Security returned patients belongings to him @ bedside.

## 2025-07-08 NOTE — DISCHARGE SUMMARY
"Hospital Medicine  Discharge Summary    Patient Name: Jurgen Berg  MRN: [id number]  Admit Date: 6/30/2025  Discharge Date: 07/08/2025   Status: IP- Inpatient   Length of Stay: 7      PHYSICIANS   Admitting Physician: Julio C Burroughs MD  Discharging Physician: Sly Ryan MD.  Primary Care Physician: None  Consults: Psychiatry      DISCHARGE DIAGNOSES   Rhabdomyolysis, etiology unclear  ZELDA s/t above  AGMA s/t above  Bipolar I disorder with manic episode and severe psychotic features       PROCEDURES   None      HOSPITAL COURSE    "29 year old man with a PMHx of Bipolar disorder was brought to the hospital by family due to "bizarre behavior". Vital signs grossly normal on admission. Of note patient was agitated and had briefly required restraints. WBC 20.28 and improving. Cr 1.54 on admission. AG 22 with bicarb 15. Lactate normal. CPK 11,284. . Utox positive for marijuana. UA without signs of infection.      I saw the patient in room 515 this morning. He reports that he feels like God is speaking to him and is in him. He denies auditory, tactile or visual hallucinations. He reports feeling full of energy and gambling. Lives at home with his parents. He works in a restaurant. Denies suicidal or homicidal ideations. He denies substance use apart from marijuana. Also does vaping.     Unclear etiology of rhabdomyolysis."    Patient initially with presenting with manic episode and placed under PEC, subsequent CEC.  Patient started on aggressive IV fluid hydration.  Renally function return to normal, though slowly improving.  CK 11,000 on adamant, trended up to 13,000 before beginning it is downward trend.  Psych has been following, and has adjusted antipsychotics.  In the interim patient with improve psychosis.  Patient has been re-evaluated by psych who note patient has stabilized on medications.  We are in agreement that pec can be rescinded.  CPK is down to 500 inpatient is appropriate for discharge " home.  He will need to remain well hydrated over the next few days.  He is advised he will need to continue with the psychiatric medications and psychiatric follow up.       STATUS  Improved    DISPOSITION  Discharge to home    DIET  Regular    ACTIVITY  As tolerated      FOLLOW-UP      Center, [facility]. Schedule an appointment as soon as possible for a visit.    Specialties: Behavioral Health, Psychiatry, Psychology  Contact information:  [place] [id number] [phone]               DISCHARGE MEDICATION RECONCILIATION     PRESCRIBED     OLANZapine 5 MG tablet  Commonly known as: ZyPREXA  Take 1 tablet (5 mg total) by mouth once daily.     traZODone 50 MG tablet  Commonly known as: DESYREL  Take 1 tablet (50 mg total) by mouth every evening.            These medications were sent to   [facility] [address] [place] [address] [address]  [place] [address]      Phone: [phone]   OLANZapine 5 MG tablet  traZODone 50 MG tablet         PHYSICAL EXAM   VITALS: T 98 °F (36.7 °C)   /82   P 78   RR 20   O2 99 %    GENERAL: Awake and in NAD  LUNGS: CTA anteriorly  CVS: Normal rate  GI/: Soft, NT, bowel sounds positive.  EXTREMITIES: No LE edema  NEURO: Awake, alert mostly oriented  PSYCH: Calm and cooperative at present      Discharge time: 33 minutes     Sly Ryan MD  [facility]       DIAGNOSITCS   CBC:   Recent Labs   Lab 07/02/25 0458 07/03/25  0514   WBC 8.00 6.53   HGB 13.4* 15.1   HCT 40.2* 44.5    200       CMP:   Recent Labs   Lab 07/02/25 0458 07/03/25  0514 07/07/25  0656   GLU 97 92 126*   CALCIUM 8.4 8.8 8.5   ALBUMIN 3.4* 3.7 3.6   PROT 6.1* 7.0 6.8    142 139   K 4.2 3.9 4.3   CO2 27 30* 26   * 107 107   BUN 6.7* 7.8* 7.5*   CREATININE 0.86 0.85 0.74   ALKPHOS 52 53 75   ALT 60* 73* 54   * 153* 33   BILITOT 0.8 1.0 0.6     Estimated Creatinine Clearance: 128.8 mL/min (based on SCr of 0.74  mg/dL).      CARDIAC ENZYMES:   Recent Labs     07/07/25  0656 07/07/25  1458 07/08/25  0453   * 761* 594*         Microbiology Results (last 7 days)       Procedure Component Value Units Date/Time    Blood culture #1 **CANNOT BE ORDERED STAT** [[id number]]  (Normal) Collected: 06/30/25 1946    Order Status: Completed Specimen: Blood from Antecubital, Right Updated: 07/06/25 0002     Blood Culture No Growth at 5 days    Blood culture #2 **CANNOT BE ORDERED STAT** [[id number]]  (Normal) Collected: 06/30/25 1951    Order Status: Completed Specimen: Blood from Wrist, Left Updated: 07/06/25 0002     Blood Culture No Growth at 5 days               US Abdomen Limited  Result Date: 7/1/2025  EXAMINATION: US ABDOMEN LIMITED CLINICAL HISTORY: RUQ US - elevated LFT; COMPARISON: No previous studies are available for comparison. FINDINGS: Grayscale, color and spectral doppler evaluation of the right upper quadrant. No focal abnormality of limited visualized pancreas. Imaged portion of the IVC is normal in caliber. Liver is not significantly enlarged. No focal liver lesion. Normal hepatopetal flow is noted in the portal vein. No gallstones. No significant gallbladder wall thickening or pericholecystic fluid.  The common bile duct is normal in caliber  and measures 3 mm. Right kidney measures 9 cm in length. No hydronephrosis.     No significant sonographic abnormality of the right upper quadrant. Electronically signed by: Tha Lloyd Date:    07/01/2025 Time:    14:24

## 2025-07-08 NOTE — PLAN OF CARE
Problem: Fall Injury Risk  Goal: Absence of Fall and Fall-Related Injury  7/8/2025 1450 by Ashwin Villafuerte RN  Outcome: Met  7/8/2025 0840 by Ashwin Villafuerte RN  Outcome: Progressing     Problem: Adult Inpatient Plan of Care  Goal: Plan of Care Review  7/8/2025 1450 by Ashwin Villafuerte RN  Outcome: Met  7/8/2025 0840 by Ashwin Villafuerte RN  Outcome: Progressing  Goal: Patient-Specific Goal (Individualized)  7/8/2025 1450 by Ashwin Villafuerte, RN  Outcome: Met  7/8/2025 0840 by Ashwin Villafuerte RN  Outcome: Progressing  Goal: Absence of Hospital-Acquired Illness or Injury  7/8/2025 1450 by Ashwin Villafuerte RN  Outcome: Met  7/8/2025 0840 by Ashwin Villafuerte RN  Outcome: Progressing  Goal: Optimal Comfort and Wellbeing  7/8/2025 1450 by Ashwin Villafuerte, RN  Outcome: Met  7/8/2025 0840 by Ashwin Villafuerte RN  Outcome: Progressing  Goal: Readiness for Transition of Care  7/8/2025 1450 by Ashwin Villafuerte RN  Outcome: Met  7/8/2025 0840 by Ashwin Villafuerte RN  Outcome: Progressing     Problem: Fluid Volume Deficit  Goal: Fluid Balance  7/8/2025 1450 by Ashwin Villafuerte, RN  Outcome: Met  7/8/2025 0840 by Ashwin Villafuerte RN  Outcome: Progressing     Problem: Disruptive Behavior  Goal: Improved Impulse and Aggression Control (Disruptive Behavior)  7/8/2025 1450 by Ashwin Villafuerte, RN  Outcome: Met  7/8/2025 0840 by Ashwin Villafuerte RN  Outcome: Progressing  Goal: Improved Mood Symptoms (Disruptive Behavior)  7/8/2025 1450 by Ashwin Villafuerte, RN  Outcome: Met  7/8/2025 0840 by Ashwin Villafuerte RN  Outcome: Progressing  Goal: Improved Sleep (Disruptive Behavior)  7/8/2025 1450 by Ashwin Villafuerte, RN  Outcome: Met  7/8/2025 0840 by Ashwin Villafuerte, RN  Outcome: Progressing  Goal: Enhanced Social, Occupational or Functional Skills (Disruptive Behavior)  7/8/2025 1450 by Ashwin Villafuerte RN  Outcome: Met  7/8/2025 0840 by Ashwin Villafuerte, RN  Outcome: Progressing     Problem: Anxiety Signs/Symptoms  Goal: Optimized Energy  Level (Anxiety Signs/Symptoms)  7/8/2025 1450 by Ashwin Villafuerte RN  Outcome: Met  7/8/2025 0840 by Ashwin Villafuerte RN  Outcome: Progressing  Goal: Optimized Cognitive Function (Anxiety Signs/Symptoms)  7/8/2025 1450 by Ashwin Villafuerte RN  Outcome: Met  7/8/2025 0840 by Ashwin Villafuerte RN  Outcome: Progressing  Goal: Improved Mood Symptoms (Anxiety Signs/Symptoms)  7/8/2025 1450 by Ashwin Villafuerte RN  Outcome: Met  7/8/2025 0840 by Ashwin Villafuerte RN  Outcome: Progressing  Goal: Improved Sleep (Anxiety Signs/Symptoms)  7/8/2025 1450 by Ashwin Villafuerte RN  Outcome: Met  7/8/2025 0840 by Ashwin Villafuerte RN  Outcome: Progressing  Goal: Enhanced Social, Occupational or Functional Skills (Anxiety Signs/Symptoms)  7/8/2025 1450 by Ashwin Villafuerte RN  Outcome: Met  7/8/2025 0840 by Ashwin Villafuerte RN  Outcome: Progressing  Goal: Improved Somatic Symptoms (Anxiety Signs/Symptoms)  7/8/2025 1450 by Ashwin Villafuerte RN  Outcome: Met  7/8/2025 0840 by Ashwin Villafuerte RN  Outcome: Progressing

## 2025-07-11 ENCOUNTER — HOSPITAL ENCOUNTER (EMERGENCY)
Facility: HOSPITAL | Age: 30
Discharge: HOME | End: 2025-07-11
Attending: EMERGENCY MEDICINE
Payer: MEDICAID

## 2025-07-11 VITALS
TEMPERATURE: 98 F | HEIGHT: 65 IN | SYSTOLIC BLOOD PRESSURE: 122 MMHG | BODY MASS INDEX: 23.82 KG/M2 | HEART RATE: 102 BPM | RESPIRATION RATE: 18 BRPM | OXYGEN SATURATION: 97 % | DIASTOLIC BLOOD PRESSURE: 84 MMHG | WEIGHT: 143 LBS

## 2025-07-11 DIAGNOSIS — S61.216A LACERATION OF RIGHT LITTLE FINGER WITHOUT FOREIGN BODY WITHOUT DAMAGE TO NAIL, INITIAL ENCOUNTER: Primary | ICD-10-CM

## 2025-07-11 PROCEDURE — 99282 EMERGENCY DEPT VISIT SF MDM: CPT | Mod: 25

## 2025-07-11 PROCEDURE — 12001 RPR S/N/AX/GEN/TRNK 2.5CM/<: CPT

## 2025-07-12 NOTE — ED PROVIDER NOTES
Encounter Date: 7/11/2025       History     Chief Complaint   Patient presents with    Laceration     Pt c/o lac to R pinky finger PTA. Pt reports cutting finger on [place] rolling blade. Moderate bleeding noted at this time.       Patient is a 29-year-old male presenting with complaint of very small laceration to the tuft of the right small finger.  Patient states he cut it on a rolling blade.  Patient denies any other complaints.      Review of patient's allergies indicates:  No Known Allergies  Past Medical History:   Diagnosis Date    Bipolar disorder     History of psychiatric hospitalization     Hx of psychiatric care     Psychiatric problem      History reviewed. No pertinent surgical history.  Family History   Problem Relation Name Age of Onset    Heart disease Father      Hypertension Father      Diabetes Father       Social History[1]  Review of Systems   All other systems reviewed and are negative.      Physical Exam     Initial Vitals [07/11/25 2125]   BP Pulse Resp Temp SpO2   122/84 102 18 98 °F (36.7 °C) 97 %      MAP       --         Physical Exam    Nursing note and vitals reviewed.  Constitutional: He appears well-developed and well-nourished.   Cardiovascular:  Normal rate, regular rhythm and normal heart sounds.           Pulmonary/Chest: Breath sounds normal.     Neurological: He is alert and oriented to person, place, and time. He has normal strength.   Skin:   Patient has a very small laceration to the tuft of the right 5th finger, bleeding is controlled.  It is approximately 3 mm long.         ED Course   Lac Repair    Date/Time: 7/11/2025 9:50 PM    Performed by: Bridger Young MD  Authorized by: Bridger Young MD    Consent:     Consent obtained:  Verbal  Universal protocol:     Patient identity confirmed:  Verbally with patient  Laceration details:     Location:  Finger    Finger location:  R small finger    Length (cm):  0.3  Treatment:     Area cleansed with:   Povidone-iodine  Skin repair:     Repair method:  Tissue adhesive  Approximation:     Approximation:  Close  Repair type:     Repair type:  Simple  Post-procedure details:     Dressing:  Non-adherent dressing    Procedure completion:  Tolerated    Labs Reviewed - No data to display       Imaging Results    None          Medications - No data to display  Medical Decision Making                                        Clinical Impression:  Final diagnoses:  [S68.216A] Laceration of right little finger without foreign body without damage to nail, initial encounter (Primary)          ED Disposition Condition    Discharge Stable          ED Prescriptions    None       Follow-up Information       Follow up With Specialties Details Why Contact Info    [facility] - Emergency Dept Emergency Medicine  If symptoms worsen [address]  [facility] [id number]  [phone]                   [1]   Social History  Tobacco Use    Smoking status: Every Day     Types: Vaping with nicotine     Start date: 2008   Vaping Use    Vaping status: Every Day    Substances: Nicotine    Devices: Pre-filled pod   Substance Use Topics    Alcohol use: Not Currently    Drug use: Yes     Types: Marijuana        Bridger Young MD  07/11/25 [id number]

## 2025-08-26 ENCOUNTER — HOSPITAL ENCOUNTER (INPATIENT)
Facility: HOSPITAL | Age: 30
LOS: 7 days | Discharge: HOME | DRG: 885 | End: 2025-09-02
Attending: PSYCHIATRY & NEUROLOGY | Admitting: PSYCHIATRY & NEUROLOGY
Payer: MEDICAID

## 2025-08-26 ENCOUNTER — HOSPITAL ENCOUNTER (EMERGENCY)
Facility: HOSPITAL | Age: 30
Discharge: PSYCHIATRIC OR SUBSTANCE USE FACILITY | End: 2025-08-26
Attending: EMERGENCY MEDICINE
Payer: COMMERCIAL

## 2025-08-26 DIAGNOSIS — F29 PSYCHOSIS: ICD-10-CM

## 2025-08-26 PROCEDURE — 12400001 HC PSYCH SEMI-PRIVATE ROOM

## 2025-08-26 PROCEDURE — 25000003 PHARM REV CODE 250: Performed by: PSYCHIATRY & NEUROLOGY

## 2025-08-26 RX ORDER — CALCIUM CARBONATE 200(500)MG
1000 TABLET,CHEWABLE ORAL EVERY 8 HOURS PRN
Status: DISCONTINUED | OUTPATIENT
Start: 2025-08-26 | End: 2025-09-02 | Stop reason: HOSPADM

## 2025-08-26 RX ORDER — ACETAMINOPHEN 325 MG/1
650 TABLET ORAL EVERY 6 HOURS PRN
Status: DISCONTINUED | OUTPATIENT
Start: 2025-08-26 | End: 2025-09-02 | Stop reason: HOSPADM

## 2025-08-26 RX ORDER — LORAZEPAM 2 MG/ML
2 INJECTION INTRAMUSCULAR EVERY 4 HOURS PRN
Status: DISCONTINUED | OUTPATIENT
Start: 2025-08-26 | End: 2025-09-02 | Stop reason: HOSPADM

## 2025-08-26 RX ORDER — ONDANSETRON 4 MG/1
4 TABLET, ORALLY DISINTEGRATING ORAL EVERY 8 HOURS PRN
Status: DISCONTINUED | OUTPATIENT
Start: 2025-08-26 | End: 2025-09-02 | Stop reason: HOSPADM

## 2025-08-26 RX ORDER — HYDROXYZINE HYDROCHLORIDE 50 MG/1
50 TABLET, FILM COATED ORAL EVERY 4 HOURS PRN
Status: DISCONTINUED | OUTPATIENT
Start: 2025-08-26 | End: 2025-09-02 | Stop reason: HOSPADM

## 2025-08-26 RX ORDER — DIPHENHYDRAMINE HYDROCHLORIDE 50 MG/ML
50 INJECTION, SOLUTION INTRAMUSCULAR; INTRAVENOUS EVERY 4 HOURS PRN
Status: DISCONTINUED | OUTPATIENT
Start: 2025-08-26 | End: 2025-09-02 | Stop reason: HOSPADM

## 2025-08-26 RX ORDER — TRAZODONE HYDROCHLORIDE 100 MG/1
100 TABLET ORAL NIGHTLY PRN
Status: DISCONTINUED | OUTPATIENT
Start: 2025-08-26 | End: 2025-09-02 | Stop reason: HOSPADM

## 2025-08-26 RX ORDER — HALOPERIDOL LACTATE 5 MG/ML
5 INJECTION, SOLUTION INTRAMUSCULAR EVERY 4 HOURS PRN
Status: DISCONTINUED | OUTPATIENT
Start: 2025-08-26 | End: 2025-09-02 | Stop reason: HOSPADM

## 2025-08-26 RX ORDER — HALOPERIDOL 5 MG/1
5 TABLET ORAL EVERY 4 HOURS PRN
Status: DISCONTINUED | OUTPATIENT
Start: 2025-08-26 | End: 2025-09-02 | Stop reason: HOSPADM

## 2025-08-26 RX ORDER — GUAIFENESIN 100 MG/5ML
200 LIQUID ORAL EVERY 4 HOURS PRN
Status: DISCONTINUED | OUTPATIENT
Start: 2025-08-26 | End: 2025-09-02 | Stop reason: HOSPADM

## 2025-08-26 RX ORDER — LORAZEPAM 1 MG/1
2 TABLET ORAL EVERY 4 HOURS PRN
Status: DISCONTINUED | OUTPATIENT
Start: 2025-08-26 | End: 2025-09-02 | Stop reason: HOSPADM

## 2025-08-26 RX ORDER — TALC
6 POWDER (GRAM) TOPICAL NIGHTLY PRN
Status: DISCONTINUED | OUTPATIENT
Start: 2025-08-26 | End: 2025-09-02 | Stop reason: HOSPADM

## 2025-08-26 RX ORDER — DIPHENHYDRAMINE HCL 25 MG
50 CAPSULE ORAL EVERY 4 HOURS PRN
Status: DISCONTINUED | OUTPATIENT
Start: 2025-08-26 | End: 2025-09-02 | Stop reason: HOSPADM

## 2025-08-26 RX ADMIN — Medication 6 MG: at 08:08

## 2025-08-26 RX ADMIN — TRAZODONE HYDROCHLORIDE 100 MG: 100 TABLET ORAL at 08:08

## 2025-08-26 RX ADMIN — HYDROXYZINE HYDROCHLORIDE 50 MG: 50 TABLET ORAL at 06:08

## 2025-08-27 LAB
ALBUMIN SERPL-MCNC: 3.8 G/DL (ref 3.5–5)
ALBUMIN/GLOB SERPL: 1.3 RATIO (ref 1.1–2)
ALP SERPL-CCNC: 58 UNIT/L (ref 40–150)
ALT SERPL-CCNC: 12 UNIT/L (ref 0–55)
ANION GAP SERPL CALC-SCNC: 7 MEQ/L
AST SERPL-CCNC: 14 UNIT/L (ref 11–45)
BASOPHILS # BLD AUTO: 0.03 X10(3)/MCL
BASOPHILS NFR BLD AUTO: 0.4 %
BILIRUB SERPL-MCNC: 1.4 MG/DL
BUN SERPL-MCNC: 14.3 MG/DL (ref 8.9–20.6)
CALCIUM SERPL-MCNC: 8.9 MG/DL (ref 8.4–10.2)
CHLORIDE SERPL-SCNC: 106 MMOL/L (ref 98–107)
CHOLEST SERPL-MCNC: 132 MG/DL
CHOLEST/HDLC SERPL: 4 {RATIO} (ref 0–5)
CK SERPL-CCNC: 156 U/L (ref 30–200)
CO2 SERPL-SCNC: 27 MMOL/L (ref 22–29)
CREAT SERPL-MCNC: 0.88 MG/DL (ref 0.72–1.25)
CREAT/UREA NIT SERPL: 16
EOSINOPHIL # BLD AUTO: 0.08 X10(3)/MCL (ref 0–0.9)
EOSINOPHIL NFR BLD AUTO: 1.2 %
ERYTHROCYTE [DISTWIDTH] IN BLOOD BY AUTOMATED COUNT: 12.7 % (ref 11.5–17)
EST. AVERAGE GLUCOSE BLD GHB EST-MCNC: 99.7 MG/DL
GFR SERPLBLD CREATININE-BSD FMLA CKD-EPI: >60 ML/MIN/1.73/M2
GLOBULIN SER-MCNC: 3 GM/DL (ref 2.4–3.5)
GLUCOSE SERPL-MCNC: 100 MG/DL (ref 74–100)
HBA1C MFR BLD: 5.1 %
HCT VFR BLD AUTO: 44.1 % (ref 42–52)
HDLC SERPL-MCNC: 33 MG/DL (ref 35–60)
HGB BLD-MCNC: 15 G/DL (ref 14–18)
IMM GRANULOCYTES # BLD AUTO: 0.03 X10(3)/MCL (ref 0–0.04)
IMM GRANULOCYTES NFR BLD AUTO: 0.4 %
LDLC SERPL CALC-MCNC: 86 MG/DL (ref 50–140)
LYMPHOCYTES # BLD AUTO: 1.87 X10(3)/MCL (ref 0.6–4.6)
LYMPHOCYTES NFR BLD AUTO: 27.2 %
MCH RBC QN AUTO: 30.2 PG (ref 27–31)
MCHC RBC AUTO-ENTMCNC: 34 G/DL (ref 33–36)
MCV RBC AUTO: 88.7 FL (ref 80–94)
MONOCYTES # BLD AUTO: 0.51 X10(3)/MCL (ref 0.1–1.3)
MONOCYTES NFR BLD AUTO: 7.4 %
NEUTROPHILS # BLD AUTO: 4.36 X10(3)/MCL (ref 2.1–9.2)
NEUTROPHILS NFR BLD AUTO: 63.4 %
NRBC BLD AUTO-RTO: 0 %
PLATELET # BLD AUTO: 224 X10(3)/MCL (ref 130–400)
PMV BLD AUTO: 10.3 FL (ref 7.4–10.4)
POTASSIUM SERPL-SCNC: 4.1 MMOL/L (ref 3.5–5.1)
PROT SERPL-MCNC: 6.8 GM/DL (ref 6.4–8.3)
RBC # BLD AUTO: 4.97 X10(6)/MCL (ref 4.7–6.1)
SODIUM SERPL-SCNC: 140 MMOL/L (ref 136–145)
T PALLIDUM AB SER QL: NONREACTIVE
TRIGL SERPL-MCNC: 64 MG/DL (ref 34–140)
TSH SERPL-ACNC: 1.08 UIU/ML (ref 0.35–4.94)
VLDLC SERPL CALC-MCNC: 13 MG/DL
WBC # BLD AUTO: 6.88 X10(3)/MCL (ref 4.5–11.5)

## 2025-08-27 PROCEDURE — 82550 ASSAY OF CK (CPK): CPT | Performed by: PSYCHIATRY & NEUROLOGY

## 2025-08-27 PROCEDURE — 84443 ASSAY THYROID STIM HORMONE: CPT | Performed by: PSYCHIATRY & NEUROLOGY

## 2025-08-27 PROCEDURE — 80061 LIPID PANEL: CPT | Performed by: PSYCHIATRY & NEUROLOGY

## 2025-08-27 PROCEDURE — 36415 COLL VENOUS BLD VENIPUNCTURE: CPT | Performed by: PSYCHIATRY & NEUROLOGY

## 2025-08-27 PROCEDURE — 86780 TREPONEMA PALLIDUM: CPT | Performed by: PSYCHIATRY & NEUROLOGY

## 2025-08-27 PROCEDURE — 85025 COMPLETE CBC W/AUTO DIFF WBC: CPT | Performed by: PSYCHIATRY & NEUROLOGY

## 2025-08-27 PROCEDURE — 83036 HEMOGLOBIN GLYCOSYLATED A1C: CPT | Performed by: PSYCHIATRY & NEUROLOGY

## 2025-08-27 PROCEDURE — 12400001 HC PSYCH SEMI-PRIVATE ROOM

## 2025-08-27 PROCEDURE — 80053 COMPREHEN METABOLIC PANEL: CPT | Performed by: PSYCHIATRY & NEUROLOGY

## 2025-08-27 PROCEDURE — 25000003 PHARM REV CODE 250: Performed by: PSYCHIATRY & NEUROLOGY

## 2025-08-27 RX ORDER — OLANZAPINE 5 MG/1
5 TABLET, FILM COATED ORAL 2 TIMES DAILY
Status: DISCONTINUED | OUTPATIENT
Start: 2025-08-27 | End: 2025-09-02 | Stop reason: HOSPADM

## 2025-08-27 RX ORDER — TRAZODONE HYDROCHLORIDE 50 MG/1
50 TABLET ORAL NIGHTLY
Status: DISCONTINUED | OUTPATIENT
Start: 2025-08-27 | End: 2025-09-02 | Stop reason: HOSPADM

## 2025-08-27 RX ADMIN — OLANZAPINE 5 MG: 5 TABLET, FILM COATED ORAL at 09:08

## 2025-08-27 RX ADMIN — Medication 6 MG: at 09:08

## 2025-08-27 RX ADMIN — HYDROXYZINE HYDROCHLORIDE 50 MG: 50 TABLET ORAL at 09:08

## 2025-08-27 RX ADMIN — TRAZODONE HYDROCHLORIDE 50 MG: 50 TABLET ORAL at 09:08

## 2025-08-27 RX ADMIN — HALOPERIDOL 5 MG: 5 TABLET ORAL at 04:08

## 2025-08-27 RX ADMIN — LORAZEPAM 2 MG: 1 TABLET ORAL at 04:08

## 2025-08-27 RX ADMIN — DIPHENHYDRAMINE HYDROCHLORIDE 50 MG: 25 CAPSULE ORAL at 04:08

## 2025-08-28 PROCEDURE — 12400001 HC PSYCH SEMI-PRIVATE ROOM

## 2025-08-28 PROCEDURE — 25000003 PHARM REV CODE 250: Performed by: PSYCHIATRY & NEUROLOGY

## 2025-08-28 RX ADMIN — Medication 6 MG: at 08:08

## 2025-08-28 RX ADMIN — OLANZAPINE 5 MG: 5 TABLET, FILM COATED ORAL at 08:08

## 2025-08-28 RX ADMIN — TRAZODONE HYDROCHLORIDE 100 MG: 100 TABLET ORAL at 08:08

## 2025-08-28 RX ADMIN — TRAZODONE HYDROCHLORIDE 50 MG: 50 TABLET ORAL at 08:08

## 2025-08-28 RX ADMIN — HYDROXYZINE HYDROCHLORIDE 50 MG: 50 TABLET ORAL at 08:08

## 2025-08-29 PROCEDURE — 12400001 HC PSYCH SEMI-PRIVATE ROOM

## 2025-08-29 PROCEDURE — 25000003 PHARM REV CODE 250: Performed by: PSYCHIATRY & NEUROLOGY

## 2025-08-29 RX ADMIN — TRAZODONE HYDROCHLORIDE 50 MG: 50 TABLET ORAL at 08:08

## 2025-08-29 RX ADMIN — Medication 6 MG: at 08:08

## 2025-08-29 RX ADMIN — OLANZAPINE 5 MG: 5 TABLET, FILM COATED ORAL at 08:08

## 2025-08-29 RX ADMIN — TRAZODONE HYDROCHLORIDE 100 MG: 100 TABLET ORAL at 08:08

## 2025-08-29 RX ADMIN — HYDROXYZINE HYDROCHLORIDE 50 MG: 50 TABLET ORAL at 08:08

## 2025-08-30 PROCEDURE — 25000003 PHARM REV CODE 250: Performed by: PSYCHIATRY & NEUROLOGY

## 2025-08-30 PROCEDURE — 12400001 HC PSYCH SEMI-PRIVATE ROOM

## 2025-08-30 RX ADMIN — TRAZODONE HYDROCHLORIDE 50 MG: 50 TABLET ORAL at 08:08

## 2025-08-30 RX ADMIN — OLANZAPINE 5 MG: 5 TABLET, FILM COATED ORAL at 08:08

## 2025-08-30 RX ADMIN — Medication 6 MG: at 08:08

## 2025-08-30 RX ADMIN — HYDROXYZINE HYDROCHLORIDE 50 MG: 50 TABLET ORAL at 12:08

## 2025-08-30 RX ADMIN — TRAZODONE HYDROCHLORIDE 100 MG: 100 TABLET ORAL at 08:08

## 2025-08-31 PROCEDURE — 25000003 PHARM REV CODE 250: Performed by: PSYCHIATRY & NEUROLOGY

## 2025-08-31 PROCEDURE — 12400001 HC PSYCH SEMI-PRIVATE ROOM

## 2025-08-31 RX ORDER — TRAZODONE HYDROCHLORIDE 50 MG/1
50 TABLET ORAL NIGHTLY
Qty: 30 TABLET | Refills: 0
Start: 2025-08-31 | End: 2025-09-02

## 2025-08-31 RX ORDER — OLANZAPINE 5 MG/1
5 TABLET, FILM COATED ORAL 2 TIMES DAILY
Qty: 60 TABLET | Refills: 0
Start: 2025-08-31 | End: 2025-09-02

## 2025-08-31 RX ADMIN — HYDROXYZINE HYDROCHLORIDE 50 MG: 50 TABLET ORAL at 12:08

## 2025-08-31 RX ADMIN — OLANZAPINE 5 MG: 5 TABLET, FILM COATED ORAL at 08:08

## 2025-08-31 RX ADMIN — Medication 6 MG: at 08:08

## 2025-08-31 RX ADMIN — TRAZODONE HYDROCHLORIDE 100 MG: 100 TABLET ORAL at 08:08

## 2025-08-31 RX ADMIN — TRAZODONE HYDROCHLORIDE 50 MG: 50 TABLET ORAL at 08:08

## 2025-09-01 PROCEDURE — 12400001 HC PSYCH SEMI-PRIVATE ROOM

## 2025-09-01 PROCEDURE — 25000003 PHARM REV CODE 250: Performed by: PSYCHIATRY & NEUROLOGY

## 2025-09-01 RX ADMIN — OLANZAPINE 5 MG: 5 TABLET, FILM COATED ORAL at 08:09

## 2025-09-01 RX ADMIN — Medication 6 MG: at 08:09

## 2025-09-01 RX ADMIN — TRAZODONE HYDROCHLORIDE 100 MG: 100 TABLET ORAL at 08:09

## 2025-09-01 RX ADMIN — TRAZODONE HYDROCHLORIDE 50 MG: 50 TABLET ORAL at 08:09

## 2025-09-02 VITALS
WEIGHT: 139.63 LBS | DIASTOLIC BLOOD PRESSURE: 82 MMHG | HEART RATE: 68 BPM | HEIGHT: 62 IN | SYSTOLIC BLOOD PRESSURE: 124 MMHG | RESPIRATION RATE: 18 BRPM | OXYGEN SATURATION: 96 % | TEMPERATURE: 98 F | BODY MASS INDEX: 25.7 KG/M2

## 2025-09-02 PROCEDURE — 25000003 PHARM REV CODE 250: Performed by: PSYCHIATRY & NEUROLOGY

## 2025-09-02 RX ORDER — OLANZAPINE 5 MG/1
5 TABLET, FILM COATED ORAL 2 TIMES DAILY
Qty: 60 TABLET | Refills: 11 | Status: SHIPPED | OUTPATIENT
Start: 2025-09-02 | End: 2026-09-02

## 2025-09-02 RX ORDER — TRAZODONE HYDROCHLORIDE 50 MG/1
50 TABLET ORAL NIGHTLY
Qty: 30 TABLET | Refills: 11 | Status: SHIPPED | OUTPATIENT
Start: 2025-09-02 | End: 2026-09-02

## 2025-09-02 RX ADMIN — OLANZAPINE 5 MG: 5 TABLET, FILM COATED ORAL at 08:09

## 2025-09-02 RX ADMIN — HYDROXYZINE HYDROCHLORIDE 50 MG: 50 TABLET ORAL at 05:09
